# Patient Record
Sex: FEMALE | Race: WHITE | NOT HISPANIC OR LATINO | Employment: OTHER | URBAN - METROPOLITAN AREA
[De-identification: names, ages, dates, MRNs, and addresses within clinical notes are randomized per-mention and may not be internally consistent; named-entity substitution may affect disease eponyms.]

---

## 2017-01-06 ENCOUNTER — OFFICE VISIT (OUTPATIENT)
Dept: OPTOMETRY | Facility: CLINIC | Age: 67
End: 2017-01-06
Payer: COMMERCIAL

## 2017-01-06 DIAGNOSIS — Z96.1 PSEUDOPHAKIA OF BOTH EYES: Primary | ICD-10-CM

## 2017-01-06 PROCEDURE — 99024 POSTOP FOLLOW-UP VISIT: CPT | Performed by: OPTOMETRIST

## 2017-01-06 ASSESSMENT — REFRACTION_MANIFEST
OS_AXIS: 052
OD_SPHERE: -1.00
OD_AXIS: 107
OD_CYLINDER: +0.50
OS_SPHERE: -1.00
OS_ADD: +2.50
OD_SPHERE: -0.75
OS_SPHERE: -0.75
OS_CYLINDER: +0.75
OD_CYLINDER: +0.75
OD_ADD: +2.50
OS_AXIS: 039
OD_AXIS: 115
OS_CYLINDER: +1.00

## 2017-01-06 ASSESSMENT — VISUAL ACUITY
OD_SC+: -2
METHOD: SNELLEN - LINEAR
OS_SC+: +2
OD_SC: 20/20
OS_SC: 20/25

## 2017-01-06 ASSESSMENT — TONOMETRY
OD_IOP_MMHG: 17
OS_IOP_MMHG: 15
IOP_METHOD: TONOPEN

## 2017-01-06 ASSESSMENT — EXTERNAL EXAM - RIGHT EYE: OD_EXAM: NORMAL

## 2017-01-06 ASSESSMENT — SLIT LAMP EXAM - LIDS
COMMENTS: NORMAL
COMMENTS: NORMAL

## 2017-01-06 ASSESSMENT — EXTERNAL EXAM - LEFT EYE: OS_EXAM: NORMAL

## 2017-01-06 NOTE — PROGRESS NOTES
CHIEF COMPLAINT:   Chief Complaint   Patient presents with     Post Op (Ophthalmology) Both Eyes     Final refraction per patient    Type of surgery: cataract both eyes   Date of surgery:  Right eye- 12/22/16  Left eye-12/8/16       OBJECTIVE:     See ophthalmology exam    ASSESSMENT:         ICD-10-CM    1. Pseudophakia of both eyes Z96.1      PLAN:      Patient Instructions   Recommend new glasses.    Return for comprehensive eye exam 11/17.    Gutierrez Castillo OD

## 2017-01-06 NOTE — PATIENT INSTRUCTIONS
Recommend new glasses.    Return for comprehensive eye exam 11/17.    Gutierrez Castillo OD      Optometry Providers       Clinic Locations                                 Telephone Number   Dr. Susana Castillo   [facility]  [phone]     [facility] Hours:                [facility] Hours:       [facility] Hours:  [address]   [address] [facility]     [address] [place] [address]   [place] [address]    [place] [address]  Phone: [phone]                    Phone [phone]                      Phone: [phone]                          Monday 8:00-7:00                          Monday 8:00-7:00                          Monday 8:00-7:00              Tuesday 8:00-6:00                          Tuesday 8:00-7:00                          Tuesday 8:00-7:00              Wednesday 8:00-6:00                  Wednesday 8:00-7:00                   Wednesday 8:00-7:00      Thursday 8:00-6:00                        Thursday 8:00-7:00                         Thursday 8:00-7:00            Friday 8:00-5:00                              Friday 8:00-5:00                              Friday 8:00-5:00    Please log on to [email or web address] to order your contact lenses.  The link is found on the [facility] page.  As always, Thank you for trusting us with your health care needs!

## 2017-01-10 ENCOUNTER — TELEPHONE (OUTPATIENT)
Dept: ONCOLOGY | Facility: CLINIC | Age: 67
End: 2017-01-10

## 2017-01-10 NOTE — TELEPHONE ENCOUNTER
Pt was scheduled with TYRA Sams and she is no longer with us. Rescheduled her with Dr. He on 2/8/17 at 8 am.    Mayra Broussard, CMA

## 2017-01-26 DIAGNOSIS — C43.30 MALIGNANT MELANOMA OF SKIN OF FACE (H): ICD-10-CM

## 2017-01-26 LAB
ALBUMIN SERPL-MCNC: 4.3 G/DL (ref 3.4–5)
ALP SERPL-CCNC: 60 U/L (ref 40–150)
ALT SERPL W P-5'-P-CCNC: 23 U/L (ref 0–50)
ANION GAP SERPL CALCULATED.3IONS-SCNC: 6 MMOL/L (ref 3–14)
AST SERPL W P-5'-P-CCNC: 19 U/L (ref 0–45)
BASOPHILS # BLD AUTO: 0 10E9/L (ref 0–0.2)
BASOPHILS NFR BLD AUTO: 0.4 %
BILIRUB SERPL-MCNC: 0.6 MG/DL (ref 0.2–1.3)
BUN SERPL-MCNC: 25 MG/DL (ref 7–30)
CALCIUM SERPL-MCNC: 9.2 MG/DL (ref 8.5–10.1)
CHLORIDE SERPL-SCNC: 107 MMOL/L (ref 94–109)
CO2 SERPL-SCNC: 30 MMOL/L (ref 20–32)
CREAT SERPL-MCNC: 1.06 MG/DL (ref 0.52–1.04)
DIFFERENTIAL METHOD BLD: NORMAL
EOSINOPHIL # BLD AUTO: 0.1 10E9/L (ref 0–0.7)
EOSINOPHIL NFR BLD AUTO: 2.5 %
ERYTHROCYTE [DISTWIDTH] IN BLOOD BY AUTOMATED COUNT: 14.5 % (ref 10–15)
GFR SERPL CREATININE-BSD FRML MDRD: 52 ML/MIN/1.7M2
GLUCOSE SERPL-MCNC: 90 MG/DL (ref 70–99)
HCT VFR BLD AUTO: 43.8 % (ref 35–47)
HGB BLD-MCNC: 14.6 G/DL (ref 11.7–15.7)
IMM GRANULOCYTES # BLD: 0 10E9/L (ref 0–0.4)
IMM GRANULOCYTES NFR BLD: 0.2 %
LDH SERPL L TO P-CCNC: 182 U/L (ref 81–234)
LYMPHOCYTES # BLD AUTO: 2 10E9/L (ref 0.8–5.3)
LYMPHOCYTES NFR BLD AUTO: 40 %
MCH RBC QN AUTO: 29.3 PG (ref 26.5–33)
MCHC RBC AUTO-ENTMCNC: 33.3 G/DL (ref 31.5–36.5)
MCV RBC AUTO: 88 FL (ref 78–100)
MONOCYTES # BLD AUTO: 0.5 10E9/L (ref 0–1.3)
MONOCYTES NFR BLD AUTO: 9.4 %
NEUTROPHILS # BLD AUTO: 2.3 10E9/L (ref 1.6–8.3)
NEUTROPHILS NFR BLD AUTO: 47.5 %
PLATELET # BLD AUTO: 262 10E9/L (ref 150–450)
POTASSIUM SERPL-SCNC: 3.9 MMOL/L (ref 3.4–5.3)
PROT SERPL-MCNC: 7.2 G/DL (ref 6.8–8.8)
RBC # BLD AUTO: 4.99 10E12/L (ref 3.8–5.2)
SODIUM SERPL-SCNC: 143 MMOL/L (ref 133–144)
WBC # BLD AUTO: 4.9 10E9/L (ref 4–11)

## 2017-01-26 PROCEDURE — 80053 COMPREHEN METABOLIC PANEL: CPT | Performed by: INTERNAL MEDICINE

## 2017-01-26 PROCEDURE — 85025 COMPLETE CBC W/AUTO DIFF WBC: CPT | Performed by: INTERNAL MEDICINE

## 2017-01-26 PROCEDURE — 83615 LACTATE (LD) (LDH) ENZYME: CPT | Performed by: INTERNAL MEDICINE

## 2017-01-26 PROCEDURE — 36415 COLL VENOUS BLD VENIPUNCTURE: CPT | Performed by: INTERNAL MEDICINE

## 2017-02-01 ENCOUNTER — HOSPITAL ENCOUNTER (OUTPATIENT)
Dept: MAMMOGRAPHY | Facility: CLINIC | Age: 67
Discharge: HOME | End: 2017-02-01
Attending: INTERNAL MEDICINE | Admitting: INTERNAL MEDICINE
Payer: MEDICARE

## 2017-02-01 DIAGNOSIS — Z12.31 VISIT FOR SCREENING MAMMOGRAM: ICD-10-CM

## 2017-02-01 PROCEDURE — G0202 SCR MAMMO BI INCL CAD: HCPCS

## 2017-02-08 ENCOUNTER — ONCOLOGY VISIT (OUTPATIENT)
Dept: ONCOLOGY | Facility: CLINIC | Age: 67
End: 2017-02-08
Payer: COMMERCIAL

## 2017-02-08 VITALS
BODY MASS INDEX: 30.52 KG/M2 | HEIGHT: 66 IN | DIASTOLIC BLOOD PRESSURE: 70 MMHG | RESPIRATION RATE: 20 BRPM | HEART RATE: 76 BPM | WEIGHT: 189.9 LBS | OXYGEN SATURATION: 97 % | SYSTOLIC BLOOD PRESSURE: 100 MMHG | TEMPERATURE: 98.3 F

## 2017-02-08 DIAGNOSIS — E04.1 THYROID NODULE: ICD-10-CM

## 2017-02-08 DIAGNOSIS — C43.30 MALIGNANT MELANOMA OF SKIN OF FACE (H): Primary | ICD-10-CM

## 2017-02-08 PROCEDURE — 99214 OFFICE O/P EST MOD 30 MIN: CPT | Performed by: INTERNAL MEDICINE

## 2017-02-08 ASSESSMENT — PAIN SCALES - GENERAL: PAINLEVEL: NO PAIN (0)

## 2017-02-08 NOTE — MR AVS SNAPSHOT
After Visit Summary   2/8/2017    Elisabeth Voss    MRN: [id number]           Patient Information     Date Of Birth          1950        Visit Information        Provider Department      2/8/2017 8:00 AM Clyde He MD [facility]        Today's Diagnoses     Malignant melanoma of skin of face (H)    -  1     Thyroid nodule           Care Instructions      Please follow up with Dr. Brady or available Oncologist in 6 months.  Please come 15-30 minutes early for labs.    Thyroid US Date/Time: 2/14/17 at 11:00 - right after breast ultrasound    Follow Up Date/Time:     If you have any questions or concerns please feel free to call.    Aleksander Schuler, RN, BSN   Oncology Care Coordinator RN  [facility]  [phone]            Follow-ups after your visit        Follow-up notes from your care team     Return in about 6 months (around 8/8/2017).      Your next 10 appointments already scheduled     Feb 14, 2017 10:00 AM   MA DIAGNOSTIC DIGITAL LEFT with PHMA1, PH RAD   [facility] ([facility])    [address] [place] [id number] [phone]           Do not use any powder, lotion or deodorant under your arms or on your breast. If you do, we will ask you to remove it before your exam.  Wear comfortable, two-piece clothing.  If you have any allergies, tell your care team.  Bring any previous mammograms from other facilities or have them mailed to the breast center.            Feb 14, 2017 10:30 AM   US BREAST LEFT LIMITED 1-3 QUAD with PHUS2, PH RAD   [facility] Ultrasound ([facility])    [address] [place] [phone]           Please bring a list of your medicines (including vitamins, minerals and over-the-counter drugs). Also, tell your doctor about any allergies you may have. Wear comfortable clothes and leave your valuables at home.  You do not need to do anything  special to prepare for your exam.  Please call the Imaging Department at your exam site with any questions.            Feb 27, 2017  8:15 AM   PHYSICAL with Yon Gallagher MD   [facility] ([facility])    [address] [place] [id number] [phone]              Future tests that were ordered for you today     Open Future Orders        Priority Expected Expires Ordered    CBC with platelets differential Routine 8/8/2017 2/8/2018 2/8/2017    Comprehensive metabolic panel Routine 8/8/2017 2/8/2018 2/8/2017            Who to contact     If you have questions or need follow up information about today's clinic visit or your schedule please contact [facility] directly at [phone].  Normal or non-critical lab and imaging results will be communicated to you by MyChart, letter or phone within 4 business days after the clinic has received the results. If you do not hear from us within 7 days, please contact the clinic through [employer] or phone. If you have a critical or abnormal lab result, we will notify you by phone as soon as possible.  Submit refill requests through [facility] or call your pharmacy and they will forward the refill request to us. Please allow 3 business days for your refill to be completed.          Additional Information About Your Visit        Outcome Referrals Information     [facility] gives you secure access to your electronic health record. If you see a primary care provider, you can also send messages to your care team and make appointments. If you have questions, please call your primary care clinic.  If you do not have a primary care provider, please call [phone] and they will assist you.        Care EveryWhere ID     This is your Care EveryWhere ID. This could be used by other organizations to access your [facility] medical records  [id number]        Your Vitals Were     Pulse Temperature Respirations Height BMI (Body Mass Index) Pulse  "Oximetry    76 98.3  F (36.8  C) (Oral) 20 1.664 m (5' 5.5\") 31.11 kg/m2 97%       Blood Pressure from Last 3 Encounters:   02/08/17 100/70   12/22/16 122/73   12/08/16 122/82    Weight from Last 3 Encounters:   02/08/17 86.138 kg (189 lb 14.4 oz)   12/16/16 84.823 kg (187 lb)   12/01/16 83.915 kg (185 lb)              We Performed the Following     US Thyroid        Primary Care Provider Office Phone # Fax #    Yon Gallagher [phone]       [facility] [address] [place] [phone]        Thank you!     Thank you for choosing [facility]  for your care. Our goal is always to provide you with excellent care. Hearing back from our patients is one way we can continue to improve our services. Please take a few minutes to complete the written survey that you may receive in the mail after your visit with us. Thank you!             Your Updated Medication List - Protect others around you: Learn how to safely use, store and throw away your medicines at www.disposemymeds.org.          This list is accurate as of: 2/8/17  8:47 AM.  Always use your most recent med list.                   Brand Name Dispense Instructions for use    ACETAMINOPHEN PO      Take 1,000 mg by mouth every 12 hours       levothyroxine 50 MCG tablet    SYNTHROID/LEVOTHROID    90 tablet    TAKE ONE TABLET BY MOUTH ONCE DAILY         "

## 2017-02-08 NOTE — NURSING NOTE
"Elisabeth Voss is a 66 year old female who presents for:  Chief Complaint   Patient presents with     Oncology Clinic Visit     6 month follow up- Malignant melanoma of skin of face         Initial Vitals:  /70 mmHg  Pulse 76  Temp(Src) 98.3  F (36.8  C) (Oral)  Resp 20  Ht 1.664 m (5' 5.5\")  Wt 86.138 kg (189 lb 14.4 oz)  BMI 31.11 kg/m2  SpO2 97% Estimated body mass index is 31.11 kg/(m^2) as calculated from the following:    Height as of this encounter: 1.664 m (5' 5.5\").    Weight as of this encounter: 86.138 kg (189 lb 14.4 oz).. Body surface area is 1.99 meters squared. BP completed using cuff size: regular  No Pain (0) No LMP recorded. Patient has had a hysterectomy. Allergies and medications reviewed.     Medications: Medication refills not needed today.  Pharmacy name entered into [facility]:    [facility] [address] [place] [address] - [address] [place] [address] PHARMACY [place], [place] - [address] [facility]     Comments:     5 minutes for nursing intake (face to face time)   Mali Mobley          "

## 2017-02-08 NOTE — PROGRESS NOTES
FOLLOW-UP VISIT NOTE    PATIENT NAME: Elisabeth Voss MRN # [id number]  DATE OF VISIT: Feb 8, 2017 YOB: 1950    REFERRING PROVIDER: No referring provider defined for this encounter.     CANCER TYPE:  melanoma      SUBJECTIVE     Elisabeth Voss is a 66 year old female whose history includes:    1. June 2014 she had a melanoma removed from the underside of her left chin. Pathology showed a 1.1 mm thickness, Robe's level III, with sentinel nodes negative. PET scan was negative except for hot uptake in the left thyroid nodule. Ultrasound showed 2 nodules on the left thyroid, and 2 on the right. FNA from the hot nodule was negative.    In the interval medicine at [facility] has done well. Last August she had a cough and chest x-ray was negative. Cough has resolved.She has no symptoms to suggest metastatic disease. She has had left knee surgery for a torn meniscus. She is followed by Dr. Velazquez of [facility] every 4 months. She had a lesion from her arm removed which was benign.  The patient just had a mammogram which was read as negative, but she is scheduled for a follow-up ultrasound because of a probable cyst which may have increased slightly in size.            PAST MEDICAL HISTORY     Past Medical History   Diagnosis Date     Cancer (H)      melanoma of cheek     Lyme disease      History of blood transfusion      with hysterectomy many years ago     Thyroid disease      partial thyroidectomy 8/2014           CURRENT OUTPATIENT MEDICATIONS     Current Outpatient Prescriptions   Medication Sig Dispense Refill     levothyroxine (SYNTHROID, LEVOTHROID) 50 MCG tablet TAKE ONE TABLET BY MOUTH ONCE DAILY 90 tablet 3     ACETAMINOPHEN PO Take 1,000 mg by mouth every 12 hours          ALLERGIES     Allergies   Allergen Reactions     No Known Drug Allergies         REVIEW OF SYSTEMS   As above in the HPI, remainder of the review of systems is negative.     PHYSICAL EXAM   B/P: 100/70, T:  98.3, P: 76, R: 20  SpO2 Readings from Last 4 Encounters:   02/08/17 97%   12/22/16 99%   12/08/16 98%   12/02/16 98%     Wt Readings from Last 3 Encounters:   02/08/17 86.138 kg (189 lb 14.4 oz)   12/16/16 84.823 kg (187 lb)   12/01/16 83.915 kg (185 lb)     GEN: NAD  HEENT: Eyes visibly normal, no icterus, injection or pallor. Oropharynx is clear.  NECK: no cervical or supraclavicular lymphadenopathy  LUNGS: clear bilaterally  BREASTS:  negative  CV: regular, no murmurs, rubs, or gallops  ABDOMEN: soft, non-tender, non-distended, normal bowel sounds, no hepatosplenomegaly by percussion or palpation  EXT: warm, well perfused, no edema  NEURO: alert  SKIN: no rashes   LABORATORY AND IMAGING STUDIES     Recent Labs   Lab Test  01/26/17   0733  09/09/16   0818   NA  143  139   POTASSIUM  3.9  4.1   CHLORIDE  107  104   BUN  25  19   CR  1.06*  0.85   GLC  90  94   ANTOINE  9.2  8.8     Recent Labs   Lab Test  01/26/17   0733  09/09/16   0818  02/16/16   0851   08/06/15   0831   WBC  4.9  5.1  4.6   < >  4.5   HGB  14.6  14.5  14.7   < >  14.5   PLT  262  262  247   < >  224   MCV  88  87  88   < >  88   NEUTROPHIL  47.5   --   51.0   --   51.8    < > = values in this interval not displayed.     Recent Labs   Lab Test  01/26/17   0733  06/06/16   1509  02/16/16   0851  08/06/15   0831   BILITOTAL  0.6  0.3  0.5  0.6   ALKPHOS  60  57  61  60   ALT  23  23  34  24   AST  19  21  22  17   ALBUMIN  4.3  4.0  4.3  4.1   LDH  182   --   182  183     TSH   Date Value Ref Range Status   06/06/2016 1.45 0.40 - 4.00 mU/L Final   ]        Results for orders placed or performed during the hospital encounter of 02/01/17   MA Screening Digital Bilateral    Narrative    MA SCREENING DIGITAL BILATERAL with CAD 2/1/2017 8:11 AM    BREAST SYMPTOMS: No current breast complaints.    COMPARISON:  1/29/2016, 1/21/2015, 12/23/2013, 12/19/2012.    BREAST DENSITY: Scattered fibroglandular densities    COMMENTS: No findings of suspicion for  malignancy.    Well-circumscribed rounded lesion in the central retroareolar left  breast may have minimally increased in size likely represents slight  increase in size of a cyst. Further evaluation with spot compression  views and ultrasound are recommended to ensure that this is actually a  cyst and not a separate adjacent lesion.      Impression    IMPRESSION: BI-RADS CATEGORY: 0 - Need Additional Imaging Evaluation  and/or Prior Mammograms for Comparison. Left breast.    RECOMMENDED FOLLOW-UP: Diagnostic Mammogram and Ultrasound, left  breast.    Exam results letter mailed to patient.    NOE WYLIE MD         ASSESSMENT AND PLAN     1. Left chin melanoma, 1.1 mm thickness, node negative.Removed 2-1/2 years ago, with currently no evidence of disease. Will continue routine follow-up with a return in 6 months with similar labs.    2.  History of benign thyroid nodule, but considering that it was very active on PET scan I think a follow-up ultrasound at this point would be reasonable.We will set that up and let her know the results.    3.  Planned breast ultrasound to follow-up on mammographic abnormality which sounds to be benign.    Noe He MD

## 2017-02-08 NOTE — NURSING NOTE
DISCHARGE PLAN:  Next appointments: See patient instruction section  Departure Mode: Ambulatory  Accompanied by:   5 minutes for nursing discharge (face to face time)     Elisabeth Voss is here today for 6 month follow up for Melanoma.  Writing nurse seen patient after Medical Oncology appointment to address questions/concerns/coordinate care. Patient to have Thyroid US which was scheduled after existing breast US appointment.  Follow up in 6 months with labs. Patient ambulated by nurse to  to schedule follow up and/or lab appointments. See patient instructions and Oncologist's Progress note for further details. Questions and concerns addressed to patient's satisfaction. Patient verbalized and demonstrated understanding of plan.  Contact information provided and patient is encouraged to call with any that arise in the interim of care.    Aleksander Schuler, RN, BSN, OCN   Oncology Care Coordinator RN  [facility]  [phone]  2/8/2017, 8:52 AM

## 2017-02-08 NOTE — PATIENT INSTRUCTIONS
Please follow up with Dr. Brady or available Oncologist in 6 months.  Please come 15-30 minutes early for labs.    Thyroid US Date/Time: 2/14/17 at 11:00 - right after breast ultrasound    Follow Up Date/Time:     If you have any questions or concerns please feel free to call.    Aleksander Schuler, RN, BSN   Oncology Care Coordinator RN  [facility]  [phone]

## 2017-02-14 ENCOUNTER — HOSPITAL ENCOUNTER (OUTPATIENT)
Dept: ULTRASOUND IMAGING | Facility: CLINIC | Age: 67
End: 2017-02-14
Attending: INTERNAL MEDICINE
Payer: MEDICARE

## 2017-02-14 ENCOUNTER — HOSPITAL ENCOUNTER (OUTPATIENT)
Dept: MAMMOGRAPHY | Facility: CLINIC | Age: 67
End: 2017-02-14
Attending: INTERNAL MEDICINE
Payer: MEDICARE

## 2017-02-14 ENCOUNTER — HOSPITAL ENCOUNTER (OUTPATIENT)
Dept: ULTRASOUND IMAGING | Facility: CLINIC | Age: 67
Discharge: HOME | End: 2017-02-14
Attending: INTERNAL MEDICINE | Admitting: INTERNAL MEDICINE
Payer: MEDICARE

## 2017-02-14 DIAGNOSIS — R92.8 ABNORMAL MAMMOGRAM: ICD-10-CM

## 2017-02-14 PROCEDURE — 76536 US EXAM OF HEAD AND NECK: CPT

## 2017-02-14 PROCEDURE — G0206 DX MAMMO INCL CAD UNI: HCPCS

## 2017-02-14 PROCEDURE — 76642 ULTRASOUND BREAST LIMITED: CPT | Mod: LT

## 2017-02-27 ENCOUNTER — OFFICE VISIT (OUTPATIENT)
Dept: INTERNAL MEDICINE | Facility: CLINIC | Age: 67
End: 2017-02-27
Payer: COMMERCIAL

## 2017-02-27 VITALS
WEIGHT: 189.38 LBS | RESPIRATION RATE: 16 BRPM | SYSTOLIC BLOOD PRESSURE: 110 MMHG | HEART RATE: 72 BPM | BODY MASS INDEX: 31.03 KG/M2 | DIASTOLIC BLOOD PRESSURE: 60 MMHG | TEMPERATURE: 98 F

## 2017-02-27 DIAGNOSIS — E78.5 HYPERLIPIDEMIA LDL GOAL <160: Primary | ICD-10-CM

## 2017-02-27 DIAGNOSIS — Z00.00 ENCOUNTER FOR ROUTINE ADULT HEALTH EXAMINATION WITHOUT ABNORMAL FINDINGS: Primary | ICD-10-CM

## 2017-02-27 DIAGNOSIS — E78.5 HYPERLIPIDEMIA LDL GOAL <160: ICD-10-CM

## 2017-02-27 DIAGNOSIS — E03.8 OTHER SPECIFIED HYPOTHYROIDISM: ICD-10-CM

## 2017-02-27 LAB
CHOLEST SERPL-MCNC: 265 MG/DL
HDLC SERPL-MCNC: 62 MG/DL
LDLC SERPL CALC-MCNC: 177 MG/DL
NONHDLC SERPL-MCNC: 203 MG/DL
T4 FREE SERPL-MCNC: 1.08 NG/DL (ref 0.76–1.46)
TRIGL SERPL-MCNC: 128 MG/DL
TSH SERPL DL<=0.05 MIU/L-ACNC: 0.79 MU/L (ref 0.4–4)

## 2017-02-27 PROCEDURE — G0009 ADMIN PNEUMOCOCCAL VACCINE: HCPCS | Performed by: INTERNAL MEDICINE

## 2017-02-27 PROCEDURE — 36415 COLL VENOUS BLD VENIPUNCTURE: CPT | Performed by: INTERNAL MEDICINE

## 2017-02-27 PROCEDURE — G0439 PPPS, SUBSEQ VISIT: HCPCS | Performed by: INTERNAL MEDICINE

## 2017-02-27 PROCEDURE — 84439 ASSAY OF FREE THYROXINE: CPT | Performed by: INTERNAL MEDICINE

## 2017-02-27 PROCEDURE — 80061 LIPID PANEL: CPT | Performed by: INTERNAL MEDICINE

## 2017-02-27 PROCEDURE — 84443 ASSAY THYROID STIM HORMONE: CPT | Performed by: INTERNAL MEDICINE

## 2017-02-27 PROCEDURE — 90732 PPSV23 VACC 2 YRS+ SUBQ/IM: CPT | Performed by: INTERNAL MEDICINE

## 2017-02-27 RX ORDER — SIMVASTATIN 20 MG
20 TABLET ORAL AT BEDTIME
Qty: 90 TABLET | Refills: 1 | Status: SHIPPED | OUTPATIENT
Start: 2017-02-27 | End: 2017-08-01

## 2017-02-27 ASSESSMENT — PAIN SCALES - GENERAL: PAINLEVEL: NO PAIN (0)

## 2017-02-27 NOTE — PROGRESS NOTES
SUBJECTIVE:     CC: Elisabeth Voss is an 66 year old woman who presents for preventive health visit.     Healthy Habits:    Do you get at least three servings of calcium containing foods daily (dairy, green leafy vegetables, etc.)? yes    Amount of exercise or daily activities, outside of work: 7 day(s) per week-treadmill    Problems taking medications regularly No    Medication side effects: No    Have you had an eye exam in the past two years? yes    Do you see a dentist twice per year? no    Do you have sleep apnea, excessive snoring or daytime drowsiness?no        Hypothyroidism Follow-up      Since last visit, patient describes the following symptoms: Weight stable, no hair loss, no skin changes, no constipation, no loose stools     Taking synthroid medication and it gives her heartburn.      Today's PHQ-2 Score:   PHQ-2 ( 1999 [employer]) 12/2/2016 2/16/2016   Q1: Little interest or pleasure in doing things 0 -   Q2: Feeling down, depressed or hopeless 0 -   PHQ-2 Score 0 -   Little interest or pleasure in doing things - Not at all   Feeling down, depressed or hopeless - Not at all   PHQ-2 Score - 0       Abuse: Current or Past(Physical, Sexual or Emotional)- No  Do you feel safe in your environment - Yes    Social History   Substance Use Topics     Smoking status: Never Smoker     Smokeless tobacco: Never Used     Alcohol use 0.0 oz/week     0 Standard drinks or equivalent per week      Comment: occasional     The patient does not drink >3 drinks per day nor >7 drinks per week.    Recent Labs   Lab Test  02/23/16   0744  02/17/15   0655  06/26/14   1504   CHOL  268*  251*  234*   HDL  83  63  53   LDL  165*  163*  156*   TRIG  102  123  126   CHOLHDLRATIO   --   4.0  4.0   NHDL  185*   --    --        Reviewed orders with patient.  Reviewed health maintenance and updated orders accordingly - Yes    Mammo Decision Support:  Patient over age 50, mutual decision to screen reflected in health  maintenance.    Pertinent mammograms are reviewed under the imaging tab.  History of abnormal Pap smear: Last 3 Pap Results: No results found for: PAP  All Histories reviewed and updated in Epic.    Breast ultrasound was ok as well.    ROS:  C: NEGATIVE for fever, chills, change in weight  I: NEGATIVE for worrisome rashes, moles or lesions  E: NEGATIVE for vision changes or irritation  ENT: NEGATIVE for ear, mouth and throat problems  R: NEGATIVE for significant cough or SOB  B: NEGATIVE for masses, tenderness or discharge  CV: NEGATIVE for chest pain, palpitations or peripheral edema  GI: heartburn  : NEGATIVE for unusual urinary or vaginal symptoms. No vaginal bleeding.  M: NEGATIVE for significant arthralgias or myalgia  N: NEGATIVE for weakness, dizziness or paresthesias  P: NEGATIVE for changes in mood or affect   Main symptoms is heartburn.    Problem list, Medication list, Allergies, and Medical/Social/Surgical histories [facility] in [facility] and updated as appropriate.  OBJECTIVE:     /60 (BP Location: Right arm, Patient Position: Chair, Cuff Size: Adult Large)  Pulse 72  Temp 98  F (36.7  C) (Tympanic)  Resp 16  Wt 189 lb 6 oz (85.9 kg)  BMI 31.03 kg/m2  EXAM:  GENERAL: healthy, alert and no distress  EYES: Eyes grossly normal to inspection, PERRL and conjunctivae and sclerae normal  HENT: ear canals and TM's normal, nose and mouth without ulcers or lesions  NECK: no adenopathy, no asymmetry, masses, or scars and thyroid normal to palpation  RESP: lungs clear to auscultation - no rales, rhonchi or wheezes  CV: regular rate and rhythm, normal S1 S2, no S3 or S4, no murmur, click or rub, no peripheral edema and peripheral pulses strong  ABDOMEN: soft, nontender, no hepatosplenomegaly, no masses and bowel sounds normal  MS: no gross musculoskeletal defects noted, no edema  SKIN: no suspicious lesions or rashes  NEURO: Normal strength and tone, mentation intact and speech normal  PSYCH: mentation appears  "normal, affect normal/bright    ASSESSMENT/PLAN:         ICD-10-CM    1. Hyperlipidemia LDL goal <160 E78.5 Lipid Profile     TSH     T4 free   2. Other specified hypothyroidism E03.8 Lipid Profile     TSH     T4 free   3. Encounter for routine adult health examination without abnormal findings Z00.00 Lipid Profile     TSH     T4 free     Pneumococcal vaccine 23 valent (Pneumovax) [95024]     1st  Administration  [53019]       COUNSELING:   Reviewed preventive health counseling, as reflected in patient instructions       Regular exercise       Healthy diet/nutrition       Vaccinated for: Pneumococcal         reports that she has never smoked. She has never used smokeless tobacco.    Estimated body mass index is 31.12 kg/(m^2) as calculated from the following:    Height as of 2/8/17: 5' 5.5\" (1.664 m).    Weight as of 2/8/17: 189 lb 14.4 oz (86.1 kg).   Weight management plan: Discussed healthy diet and exercise guidelines and patient will follow up in 12 months in clinic to re-evaluate.    Counseling Resources:  ATP IV Guidelines  Pooled Cohorts Equation Calculator  Breast Cancer Risk Calculator  FRAX Risk Assessment  ICSI Preventive Guidelines  Dietary Guidelines for Americans, 2010  USDA's MyPlate  ASA Prophylaxis  Lung CA Screening    Yon Gallagher MD  [facility]  "

## 2017-02-27 NOTE — TELEPHONE ENCOUNTER
----- Message from Yon Gallagher MD sent at 2/27/2017 10:44 AM CST -----  Her thyroid is good, no changes to synthroid.  Cholesterol is too high, up from last year, would start simvastatin 20mg a day. Then recheck lipids and lfts in 6-8 weeks.

## 2017-02-27 NOTE — NURSING NOTE
"Chief Complaint   Patient presents with     Physical       Initial /60 (BP Location: Right arm, Patient Position: Chair, Cuff Size: Adult Large)  Pulse 72  Temp 98  F (36.7  C) (Tympanic)  Resp 16  Wt 189 lb 6 oz (85.9 kg)  BMI 31.03 kg/m2 Estimated body mass index is 31.03 kg/(m^2) as calculated from the following:    Height as of 2/8/17: 5' 5.5\" (1.664 m).    Weight as of this encounter: 189 lb 6 oz (85.9 kg).  Medication Reconciliation: complete   Jenny Cortez CMA (AAMA)     Prior to injection verified patient identity using patient's name and date of birth.. Patient instructed to wait 20 minutes before leaving clinic. Observe for s/sx of complications and or reactions.  Screening Questionnaire for Adult Immunization    Are you sick today?   No   Do you have allergies to medications, food, a vaccine component or latex?   No   Have you ever had a serious reaction after receiving a vaccination?   No   Do you have a long-term health problem with heart disease, lung disease, asthma, kidney disease, metabolic disease (e.g. diabetes), anemia, or other blood disorder?   No   Do you have cancer, leukemia, HIV/AIDS, or any other immune system problem?   No   In the past 3 months, have you taken medications that affect  your immune system, such as prednisone, other steroids, or anticancer drugs; drugs for the treatment of rheumatoid arthritis, Crohn s disease, or psoriasis; or have you had radiation treatments?   No   Have you had a seizure, or a brain or other nervous system problem?   No   During the past year, have you received a transfusion of blood or blood     products, or been given immune (gamma) globulin or antiviral drug?   No   For women: Are you pregnant or is there a chance you could become        pregnant during the next month?   No   Have you received any vaccinations in the past 4 weeks?   No     Immunization questionnaire answers were all negative.      MNVFC doesn't apply on this " patient    Screening performed by Jenny Cortez on 2/27/2017 at 8:47 AM.    Jenny Cortez CMA ([facility])

## 2017-02-27 NOTE — MR AVS SNAPSHOT
After Visit Summary   2/27/2017    Elisabeth Voss    MRN: [id number]           Patient Information     Date Of Birth          1950        Visit Information        Provider Department      2/27/2017 8:15 AM Yon Gallagher MD [facility] Instructions      Preventive Health Recommendations  Female Ages 65 +    Yearly exam:     See your health care provider every year in order to  o Review health changes.   o Discuss preventive care.    o Review your medicines if your doctor has prescribed any.      You no longer need a yearly Pap test unless you've had an abnormal Pap test in the past 10 years. If you have vaginal symptoms, such as bleeding or discharge, be sure to talk with your provider about a Pap test.      Every 1 to 2 years, have a mammogram.  If you are over 69, talk with your health care provider about whether or not you want to continue having screening mammograms.      Every 10 years, have a colonoscopy. Or, have a yearly FIT test (stool test). These exams will check for colon cancer.       Have a cholesterol test every 5 years, or more often if your doctor advises it.       Have a diabetes test (fasting glucose) every three years. If you are at risk for diabetes, you should have this test more often.       At age 65, have a bone density scan (DEXA) to check for osteoporosis (brittle bone disease).    Shots:    Get a flu shot each year.    Get a tetanus shot every 10 years.    Talk to your doctor about your pneumonia vaccines. There are now two you should receive - Pneumovax (PPSV 23) and Prevnar (PCV 13).    Talk to your doctor about the shingles vaccine.    Talk to your doctor about the hepatitis B vaccine.    Nutrition:     Eat at least 5 servings of fruits and vegetables each day.      Eat whole-grain bread, whole-wheat pasta and brown rice instead of white grains and rice.      Talk to your provider about Calcium and Vitamin D.     Lifestyle    Exercise  at least 150 minutes a week (30 minutes a day, 5 days a week). This will help you control your weight and prevent disease.      Limit alcohol to one drink per day.      No smoking.       Wear sunscreen to prevent skin cancer.       See your dentist twice a year for an exam and cleaning.      See your eye doctor every 1 to 2 years to screen for conditions such as glaucoma, macular degeneration, cataracts, etc         Follow-ups after your visit        Your next 10 appointments already scheduled     Jul 21, 2017  8:00 AM CDT   LAB with [facility]   [facility]    [address] [place] [address] [id number] [phone]           Patient must bring picture ID.  Patient should be prepared to give a urine specimen  Please do not eat 10-12 hours before your appointment if you are coming in fasting for labs on lipids, cholesterol, or glucose (sugar).  Pregnant women should follow their Care Team instructions. Water with medications is okay. Do not drink coffee or other fluids.   If you have concerns about taking  your medications, please ask at office or if scheduling via [employer], send a message by clicking on Secure Messaging, Message Your Care Team.            Jul 27, 2017  8:00 AM CDT   Return Visit with Clyde He MD   [facility] ([address] [phone]              Who to contact     If you have questions or need follow up information about today's clinic visit or your schedule please contact [facility] directly at [phone].  Normal or non-critical lab and imaging results will be communicated to you by MyChart, letter or phone within 4 business days after the clinic has received the results. If you do not hear from us within 7 days, please contact the clinic through MyChart or phone. If you have a critical or abnormal lab result, we will notify you by phone as  soon as possible.  Submit refill requests through [employer] or call your pharmacy and they will forward the refill request to us. Please allow 3 business days for your refill to be completed.          Additional Information About Your Visit        Lapiohart Information     [employer] gives you secure access to your electronic health record. If you see a primary care provider, you can also send messages to your care team and make appointments. If you have questions, please call your primary care clinic.  If you do not have a primary care provider, please call [phone] and they will assist you.        Care EveryWhere ID     This is your Care EveryWhere ID. This could be used by other organizations to access your [facility] medical records  [id number]        Your Vitals Were     Pulse Temperature Respirations BMI (Body Mass Index)          72 98  F (36.7  C) (Tympanic) 16 31.03 kg/m2         Blood Pressure from Last 3 Encounters:   02/27/17 110/60   02/08/17 100/70   12/22/16 122/73    Weight from Last 3 Encounters:   02/27/17 189 lb 6 oz (85.9 kg)   02/08/17 189 lb 14.4 oz (86.1 kg)   12/16/16 187 lb (84.8 kg)              Today, you had the following     No orders found for display       Primary Care Provider Office Phone # Fax #    Yon Gallagher [phone]       [facility] [address] [place] [address]        Thank you!     Thank you for choosing [facility]  for your care. Our goal is always to provide you with excellent care. Hearing back from our patients is one way we can continue to improve our services. Please take a few minutes to complete the written survey that you may receive in the mail after your visit with us. Thank you!             Your Updated Medication List - Protect others around you: Learn how to safely use, store and throw away your medicines at www.disposemymeds.org.          This list is accurate as of: 2/27/17  8:28 AM.  Always use your most  recent med list.                   Brand Name Dispense Instructions for use    ACETAMINOPHEN PO      Take 1,000 mg by mouth every 12 hours       levothyroxine 50 MCG tablet    SYNTHROID/LEVOTHROID    90 tablet    TAKE ONE TABLET BY MOUTH ONCE DAILY

## 2017-02-27 NOTE — TELEPHONE ENCOUNTER
Patient notified of below by voice mail. Please sign for med and close.  Jenny Cortez CMA ([facility])

## 2017-03-14 ENCOUNTER — TELEPHONE (OUTPATIENT)
Dept: OPHTHALMOLOGY | Facility: CLINIC | Age: 67
End: 2017-03-14

## 2017-03-14 NOTE — TELEPHONE ENCOUNTER
Using smooth got red, swollen, and cracked skin on the eye lid. Also stated that when she takes off her glasses the skin feels better. Explained that the unless the frames are metal and they sit directly on her skin that the glasses will not be causing her problem.  Advised her to use a different type of lubricating eye drop as there could be an ingredient in the smooth that she has a sensitivity to. Advised to get preservative free drops. Discussed her light sensitivity and explained that after cataract surgery it is completely normal and expected advised to wear sunglasses to protect her eyes.  Pt verbalized understanding and agreed with this plan    Linda FARMER.

## 2017-03-14 NOTE — TELEPHONE ENCOUNTER
"[facility]    Phone Message    Name of Caller: Elisabeth    Phone Number: Home number on file [phone] (home)    Best time to return call: After 1:00 Pm today    May a detailed message be left on voicemail: yes    Relation to patient: Self    Reason for Call: Symptoms or Concerns     Does patient have any of the following \"red flag\" symptoms: None    Does patient have any \"Red Flag\" symptoms? No.     Current symptom or concern: Patient states she had surgery with Dr. Painter back in December. Patient states the last 3 to 5 light has been bothering her eyes, her eye lids have become dry to the point of needing to add Vaseline around her eyes before applying eye drops. Patient also states her glasses are off the irration goes down. Please advise      Symptoms have been present for:  3-5 day(s)    Has patient previously been seen for this? No      Action Taken: Message routed to:  Adult Clinics: Eye p [id number]    "

## 2017-04-28 DIAGNOSIS — E78.5 HYPERLIPIDEMIA LDL GOAL <160: ICD-10-CM

## 2017-04-28 LAB
ALT SERPL W P-5'-P-CCNC: 38 U/L (ref 0–50)
AST SERPL W P-5'-P-CCNC: 27 U/L (ref 0–45)
CHOLEST SERPL-MCNC: 198 MG/DL
HDLC SERPL-MCNC: 81 MG/DL
LDLC SERPL CALC-MCNC: 99 MG/DL
NONHDLC SERPL-MCNC: 117 MG/DL
TRIGL SERPL-MCNC: 92 MG/DL

## 2017-04-28 PROCEDURE — 80061 LIPID PANEL: CPT | Performed by: INTERNAL MEDICINE

## 2017-04-28 PROCEDURE — 84450 TRANSFERASE (AST) (SGOT): CPT | Performed by: INTERNAL MEDICINE

## 2017-04-28 PROCEDURE — 84460 ALANINE AMINO (ALT) (SGPT): CPT | Performed by: INTERNAL MEDICINE

## 2017-04-28 PROCEDURE — 36415 COLL VENOUS BLD VENIPUNCTURE: CPT | Performed by: INTERNAL MEDICINE

## 2017-07-20 ENCOUNTER — TRANSFERRED RECORDS (OUTPATIENT)
Dept: HEALTH INFORMATION MANAGEMENT | Facility: CLINIC | Age: 67
End: 2017-07-20

## 2017-07-21 DIAGNOSIS — C43.30 MALIGNANT MELANOMA OF SKIN OF FACE (H): ICD-10-CM

## 2017-07-21 LAB
ALBUMIN SERPL-MCNC: 4 G/DL (ref 3.4–5)
ALP SERPL-CCNC: 63 U/L (ref 40–150)
ALT SERPL W P-5'-P-CCNC: 29 U/L (ref 0–50)
ANION GAP SERPL CALCULATED.3IONS-SCNC: 7 MMOL/L (ref 3–14)
AST SERPL W P-5'-P-CCNC: 21 U/L (ref 0–45)
BASOPHILS # BLD AUTO: 0 10E9/L (ref 0–0.2)
BASOPHILS NFR BLD AUTO: 0.4 %
BILIRUB SERPL-MCNC: 0.5 MG/DL (ref 0.2–1.3)
BUN SERPL-MCNC: 10 MG/DL (ref 7–30)
CALCIUM SERPL-MCNC: 9.1 MG/DL (ref 8.5–10.1)
CHLORIDE SERPL-SCNC: 107 MMOL/L (ref 94–109)
CO2 SERPL-SCNC: 29 MMOL/L (ref 20–32)
CREAT SERPL-MCNC: 1.02 MG/DL (ref 0.52–1.04)
DIFFERENTIAL METHOD BLD: NORMAL
EOSINOPHIL # BLD AUTO: 0.2 10E9/L (ref 0–0.7)
EOSINOPHIL NFR BLD AUTO: 3.5 %
ERYTHROCYTE [DISTWIDTH] IN BLOOD BY AUTOMATED COUNT: 14.1 % (ref 10–15)
GFR SERPL CREATININE-BSD FRML MDRD: 54 ML/MIN/1.7M2
GLUCOSE SERPL-MCNC: 91 MG/DL (ref 70–99)
HCT VFR BLD AUTO: 43.3 % (ref 35–47)
HGB BLD-MCNC: 14.1 G/DL (ref 11.7–15.7)
IMM GRANULOCYTES # BLD: 0 10E9/L (ref 0–0.4)
IMM GRANULOCYTES NFR BLD: 0.2 %
LYMPHOCYTES # BLD AUTO: 1.7 10E9/L (ref 0.8–5.3)
LYMPHOCYTES NFR BLD AUTO: 34.4 %
MCH RBC QN AUTO: 28.9 PG (ref 26.5–33)
MCHC RBC AUTO-ENTMCNC: 32.6 G/DL (ref 31.5–36.5)
MCV RBC AUTO: 89 FL (ref 78–100)
MONOCYTES # BLD AUTO: 0.4 10E9/L (ref 0–1.3)
MONOCYTES NFR BLD AUTO: 8.4 %
NEUTROPHILS # BLD AUTO: 2.6 10E9/L (ref 1.6–8.3)
NEUTROPHILS NFR BLD AUTO: 53.1 %
PLATELET # BLD AUTO: 229 10E9/L (ref 150–450)
POTASSIUM SERPL-SCNC: 4.3 MMOL/L (ref 3.4–5.3)
PROT SERPL-MCNC: 6.9 G/DL (ref 6.8–8.8)
RBC # BLD AUTO: 4.88 10E12/L (ref 3.8–5.2)
SODIUM SERPL-SCNC: 143 MMOL/L (ref 133–144)
WBC # BLD AUTO: 4.9 10E9/L (ref 4–11)

## 2017-07-21 PROCEDURE — 85025 COMPLETE CBC W/AUTO DIFF WBC: CPT | Performed by: INTERNAL MEDICINE

## 2017-07-21 PROCEDURE — 36415 COLL VENOUS BLD VENIPUNCTURE: CPT | Performed by: INTERNAL MEDICINE

## 2017-07-21 PROCEDURE — 80053 COMPREHEN METABOLIC PANEL: CPT | Performed by: INTERNAL MEDICINE

## 2017-07-27 ENCOUNTER — ONCOLOGY VISIT (OUTPATIENT)
Dept: ONCOLOGY | Facility: CLINIC | Age: 67
End: 2017-07-27
Payer: COMMERCIAL

## 2017-07-27 VITALS
DIASTOLIC BLOOD PRESSURE: 73 MMHG | TEMPERATURE: 97.1 F | BODY MASS INDEX: 30.15 KG/M2 | HEIGHT: 66 IN | RESPIRATION RATE: 16 BRPM | OXYGEN SATURATION: 97 % | SYSTOLIC BLOOD PRESSURE: 114 MMHG | HEART RATE: 77 BPM | WEIGHT: 187.6 LBS

## 2017-07-27 DIAGNOSIS — C43.30 MALIGNANT MELANOMA OF SKIN OF FACE (H): Primary | ICD-10-CM

## 2017-07-27 PROCEDURE — 99213 OFFICE O/P EST LOW 20 MIN: CPT | Performed by: INTERNAL MEDICINE

## 2017-07-27 ASSESSMENT — PAIN SCALES - GENERAL: PAINLEVEL: NO PAIN (0)

## 2017-07-27 NOTE — PROGRESS NOTES
FOLLOW-UP VISIT NOTE    PATIENT NAME: Elisabeth Voss MRN # [id number]  DATE OF VISIT: Jul 27, 2017 YOB: 1950    REFERRING PROVIDER: No referring provider defined for this encounter.    CANCER TYPE:  melanoma      SUBJECTIVE     Elisabeth Voss is a 66 year old female whose history includes:     1. June 2014 she had a melanoma removed from the underside of her left chin. Pathology showed a 1.1 mm thickness, Robe's level III, with sentinel nodes negative. PET scan was negative except for hot uptake in the left thyroid nodule. Ultrasound showed 2 nodules on the left thyroid, and 2 on the right. FNA from the hot nodule was negative.  2.  2/14/17 followup thyroid ultrasound stable, benign      The patient has no current concerns.          PAST MEDICAL HISTORY     Past Medical History:   Diagnosis Date     Cancer (H)     melanoma of cheek     History of blood transfusion     with hysterectomy many years ago     Lyme disease      Thyroid disease     partial thyroidectomy 8/2014           CURRENT OUTPATIENT MEDICATIONS     Current Outpatient Prescriptions   Medication Sig Dispense Refill     simvastatin (ZOCOR) 20 MG tablet Take 1 tablet (20 mg) by mouth At Bedtime 90 tablet 1     ACETAMINOPHEN PO Take 1,000 mg by mouth every 12 hours       levothyroxine (SYNTHROID, LEVOTHROID) 50 MCG tablet TAKE ONE TABLET BY MOUTH ONCE DAILY 90 tablet 3        ALLERGIES     Allergies   Allergen Reactions     No Known Drug Allergies         REVIEW OF SYSTEMS   As above in the HPI, remainder of the review of systems is negative.     PHYSICAL EXAM   B/P: 114/73, T: 97.1, P: 77, R: 16  SpO2 Readings from Last 4 Encounters:   07/27/17 97%   02/08/17 97%   12/22/16 99%   12/08/16 98%     Wt Readings from Last 3 Encounters:   07/27/17 85.1 kg (187 lb 9.6 oz)   02/27/17 85.9 kg (189 lb 6 oz)   02/08/17 86.1 kg (189 lb 14.4 oz)     GEN: NAD  HEENT: Eyes visibly normal, no icterus, injection or pallor. Oropharynx is  clear.  NECK: no cervical or supraclavicular lymphadenopathy  AXILLAE- neg.  LUNGS: clear bilaterally  CV: regular, no murmurs, rubs, or gallops  ABDOMEN: soft, non-tender, non-distended, normal bowel sounds, no hepatosplenomegaly by percussion or palpation     LABORATORY AND IMAGING STUDIES     Recent Labs   Lab Test  07/21/17   0753  01/26/17   0733   NA  143  143   POTASSIUM  4.3  3.9   CHLORIDE  107  107   BUN  10  25   CR  1.02  1.06*   GLC  91  90   ANTOINE  9.1  9.2     Recent Labs   Lab Test  07/21/17   0753  01/26/17   0733  09/09/16   0818  02/16/16   0851   WBC  4.9  4.9  5.1  4.6   HGB  14.1  14.6  14.5  14.7   PLT  229  262  262  247   MCV  89  88  87  88   NEUTROPHIL  53.1  47.5   --   51.0     Recent Labs   Lab Test  07/21/17   0753  04/28/17   0745  01/26/17   0733  06/06/16   1509  02/16/16   0851  08/06/15   0831   BILITOTAL  0.5   --   0.6  0.3  0.5  0.6   ALKPHOS  63   --   60  57  61  60   ALT  29  38  23  23  34  24   AST  21  27  19  21  22  17   ALBUMIN  4.0   --   4.3  4.0  4.3  4.1   LDH   --    --   182   --   182  183     TSH   Date Value Ref Range Status   02/27/2017 0.79 0.40 - 4.00 mU/L Final   ]     ASSESSMENT AND PLAN     1.  Hx melanoma left neck, now 3 yrs out from surgery.  No evidence of disease.  Prognosis should be good.  Because of location of lesion will continue [id number] followup, probably to the 5 yr point.      Clyde He MD

## 2017-07-27 NOTE — MR AVS SNAPSHOT
After Visit Summary   7/27/2017    Elisabeth Voss    MRN: [id number]           Patient Information     Date Of Birth          1950        Visit Information        Provider Department      7/27/2017 8:00 AM Clyde He MD [facility]        Care Instructions      Please follow up in 6 months.  No need for labs.      Follow Up Date/Time:     If you have any questions or concerns please feel free to call.    Aleksander Schuler RN, BSN   Oncology Care Coordinator RN  [facility]  [phone]              Follow-ups after your visit        Who to contact     If you have questions or need follow up information about today's clinic visit or your schedule please contact [facility] directly at [phone].  Normal or non-critical lab and imaging results will be communicated to you by SupportSpacehart, letter or phone within 4 business days after the clinic has received the results. If you do not hear from us within 7 days, please contact the clinic through SupportSpacehart or phone. If you have a critical or abnormal lab result, we will notify you by phone as soon as possible.  Submit refill requests through [employer] or call your pharmacy and they will forward the refill request to us. Please allow 3 business days for your refill to be completed.          Additional Information About Your Visit        MyChart Information     [employer] gives you secure access to your electronic health record. If you see a primary care provider, you can also send messages to your care team and make appointments. If you have questions, please call your primary care clinic.  If you do not have a primary care provider, please call [phone] and they will assist you.        Care EveryWhere ID     This is your Care EveryWhere ID. This could be used by other organizations to access your [facility] medical records  [id number]        Your Vitals Were     Pulse Temperature Respirations Height Pulse  "Oximetry BMI (Body Mass Index)    77 97.1  F (36.2  C) (Temporal) 16 1.664 m (5' 5.5\") 97% 30.74 kg/m2       Blood Pressure from Last 3 Encounters:   07/27/17 114/73   02/27/17 110/60   02/08/17 100/70    Weight from Last 3 Encounters:   07/27/17 85.1 kg (187 lb 9.6 oz)   02/27/17 85.9 kg (189 lb 6 oz)   02/08/17 86.1 kg (189 lb 14.4 oz)              Today, you had the following     No orders found for display       Primary Care Provider Office Phone # Fax #    Yon Gallagher [phone]       [facility] [address] [place] [address]        Equal Access to Services     DEE DEE RAMIREZ : Hadii aad ku hadasho Soomaali, waaxda luqadaha, qaybta kaalmada adeegyada, terri quiñonez . So [facility] [phone].    ATENCIÓN: Si habla español, tiene a ochoa disposición servicios gratuitos de asistencia lingüística. Claudia al [phone].    We comply with applicable federal civil rights laws and [place] laws. We do not discriminate on the basis of race, color, national origin, age, disability sex, sexual orientation or gender identity.            Thank you!     Thank you for choosing [facility]  for your care. Our goal is always to provide you with excellent care. Hearing back from our patients is one way we can continue to improve our services. Please take a few minutes to complete the written survey that you may receive in the mail after your visit with us. Thank you!             Your Updated Medication List - Protect others around you: Learn how to safely use, store and throw away your medicines at www.disposemymeds.org.          This list is accurate as of: 7/27/17  8:17 AM.  Always use your most recent med list.                   Brand Name Dispense Instructions for use Diagnosis    ACETAMINOPHEN PO      Take 1,000 mg by mouth every 12 hours        levothyroxine 50 MCG tablet    SYNTHROID/LEVOTHROID    90 tablet    TAKE ONE TABLET BY MOUTH ONCE DAILY "    Other specified hypothyroidism       simvastatin 20 MG tablet    ZOCOR    90 tablet    Take 1 tablet (20 mg) by mouth At Bedtime    Hyperlipidemia LDL goal <160

## 2017-07-27 NOTE — PATIENT INSTRUCTIONS
Please follow up in 6 months.  No need for labs.      Follow Up Date/Time:     If you have any questions or concerns please feel free to call.    Aleksander Schuler RN, BSN   Oncology Care Coordinator RN  [facility]  [phone]

## 2017-07-27 NOTE — NURSING NOTE
"Oncology Rooming Note    July 27, 2017 7:57 AM   Elisabeth Voss is a 67 year old female who presents for:    Chief Complaint   Patient presents with     Oncology Clinic Visit     6 month follow up for Malignant melanoma of skin of face     Results     Labs 7/21/2017     Initial Vitals: /73 (BP Location: Right arm, Patient Position: Chair, Cuff Size: Adult Regular)  Pulse 77  Temp 97.1  F (36.2  C) (Temporal)  Resp 16  Ht 1.664 m (5' 5.5\")  Wt 85.1 kg (187 lb 9.6 oz)  SpO2 97%  BMI 30.74 kg/m2 Estimated body mass index is 30.74 kg/(m^2) as calculated from the following:    Height as of this encounter: 1.664 m (5' 5.5\").    Weight as of this encounter: 85.1 kg (187 lb 9.6 oz). Body surface area is 1.98 meters squared.  No Pain (0) Comment: Data Unavailable   No LMP recorded. Patient has had a hysterectomy.  Allergies reviewed: Yes  Medications reviewed: Yes    Medications: Medication refills not needed today.  Pharmacy name entered into [employer]ges:    [facility] [address] [place] [address] - [address] [place] [address] PHARMACY [place], [place] - [address] [address] [facility] [address]     Clinical concerns:Wants to know about recurrence. Dr. He was notified.        Doreen Lyon MA              "

## 2017-07-27 NOTE — NURSING NOTE
DISCHARGE PLAN:  Next appointments: See patient instruction section  Departure Mode: Ambulatory  Accompanied by:   5 minutes for nursing discharge (face to face time)     Elisabeth Voss is here today for Oncology follow up for Melanoma.  Writing nurse seen patient after Medical Oncology appointment to address questions/concerns/coordinate care. Patient to continue with follow up in 6 months.  No labs needed.  Reviewed importance of skin exams. Patient ambulated by nurse to  to schedule follow up and/or lab appointments. See patient instructions and Oncologist's Progress note for further details. Questions and concerns addressed to patient's satisfaction. Patient verbalized and demonstrated understanding of plan.  Contact information provided and patient is encouraged to call with any that arise in the interim of care.    Aleksander Schuler, RN, BSN, OCN   Oncology Care Coordinator RN  [facility]  [phone]  7/27/2017, 8:28 AM

## 2017-08-01 ENCOUNTER — TELEPHONE (OUTPATIENT)
Dept: INTERNAL MEDICINE | Facility: CLINIC | Age: 67
End: 2017-08-01

## 2017-08-01 ENCOUNTER — TRANSFERRED RECORDS (OUTPATIENT)
Dept: HEALTH INFORMATION MANAGEMENT | Facility: CLINIC | Age: 67
End: 2017-08-01

## 2017-08-01 DIAGNOSIS — E78.5 HYPERLIPIDEMIA LDL GOAL <160: ICD-10-CM

## 2017-08-01 DIAGNOSIS — E03.8 OTHER SPECIFIED HYPOTHYROIDISM: ICD-10-CM

## 2017-08-01 RX ORDER — SIMVASTATIN 20 MG
20 TABLET ORAL AT BEDTIME
Qty: 10 TABLET | Refills: 0 | Status: SHIPPED | OUTPATIENT
Start: 2017-08-01 | End: 2017-10-04

## 2017-08-01 RX ORDER — LEVOTHYROXINE SODIUM 50 UG/1
50 TABLET ORAL DAILY
Qty: 10 TABLET | Refills: 0 | Status: SHIPPED | OUTPATIENT
Start: 2017-08-01 | End: 2017-10-04

## 2017-08-01 NOTE — TELEPHONE ENCOUNTER
Reason for Call:  Medication or medication refill:    Do you use a [facility]?  Name of the pharmacy and phone number for the current request[facility] [place]    Name of the medication requested: simvastatin and levothyroxine    Other request: Patient is out of town and just realized that she forgot her meds. Can she get about 10 pills of each so she does not have to drive all the way home again. It is a 3 hour drive for her.     Can we leave a detailed message on this number? YES    Phone number patient can be reached at: Other phone number:  [phone]    Best Time: any    Call taken on 8/1/2017 at 2:20 PM by Elda King

## 2017-08-27 DIAGNOSIS — E03.8 OTHER SPECIFIED HYPOTHYROIDISM: ICD-10-CM

## 2017-08-27 NOTE — TELEPHONE ENCOUNTER
Synthroid      Last Written Prescription Date: 8/1/2017  Last Quantity: 10, # refills: 0  Last Office Visit with [facility] or [facility] prescribing provider: 2/27/2017        TSH   Date Value Ref Range Status   02/27/2017 0.79 0.40 - 4.00 mU/L Final

## 2017-08-29 RX ORDER — LEVOTHYROXINE SODIUM 50 UG/1
50 TABLET ORAL DAILY
Qty: 90 TABLET | Refills: 1 | Status: SHIPPED | OUTPATIENT
Start: 2017-08-29 | End: 2018-02-28

## 2017-10-01 ENCOUNTER — HOSPITAL ENCOUNTER (EMERGENCY)
Facility: CLINIC | Age: 67
Discharge: HOME | End: 2017-10-01
Attending: FAMILY MEDICINE | Admitting: FAMILY MEDICINE
Payer: MEDICARE

## 2017-10-01 ENCOUNTER — APPOINTMENT (OUTPATIENT)
Dept: GENERAL RADIOLOGY | Facility: CLINIC | Age: 67
End: 2017-10-01
Attending: FAMILY MEDICINE
Payer: MEDICARE

## 2017-10-01 VITALS
WEIGHT: 194 LBS | SYSTOLIC BLOOD PRESSURE: 130 MMHG | BODY MASS INDEX: 31.18 KG/M2 | HEIGHT: 66 IN | RESPIRATION RATE: 18 BRPM | TEMPERATURE: 97.6 F | HEART RATE: 66 BPM | OXYGEN SATURATION: 97 % | DIASTOLIC BLOOD PRESSURE: 76 MMHG

## 2017-10-01 DIAGNOSIS — S92.324A CLOSED NONDISPLACED FRACTURE OF SECOND METATARSAL BONE OF RIGHT FOOT, INITIAL ENCOUNTER: ICD-10-CM
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
## 2017-10-01 PROCEDURE — 99283 EMERGENCY DEPT VISIT LOW MDM: CPT | Performed by: FAMILY MEDICINE

## 2017-10-01 PROCEDURE — 73630 X-RAY EXAM OF FOOT: CPT | Mod: TC,RT

## 2017-10-01 PROCEDURE — 99283 EMERGENCY DEPT VISIT LOW MDM: CPT | Mod: Z6 | Performed by: FAMILY MEDICINE

## 2017-10-01 PROCEDURE — 25000132 ZZH RX MED GY IP 250 OP 250 PS 637: Mod: GY | Performed by: FAMILY MEDICINE

## 2017-10-01 RX ORDER — IBUPROFEN 600 MG/1
600 TABLET, FILM COATED ORAL ONCE
Status: COMPLETED | OUTPATIENT
Start: 2017-10-01 | End: 2017-10-01

## 2017-10-01 RX ADMIN — IBUPROFEN 600 MG: 600 TABLET ORAL at 09:32

## 2017-10-01 NOTE — DISCHARGE INSTRUCTIONS
See Dr. Rousseau in clinic.  Boot and crutches until then.  You may use Naproxen and Tylenol as needed for pain.  Ice 20 minutes per hour, (20 minutes on, 40 minutes off) at least 4 times a day.   Elevate your foot above heart level to keep the swelling down.  It was a pleasure visiting with both of you this morning.  I this heals up quickly for you.  Thank you for being so patient with us during your ED stay.  We really do appreciate it.     Thank you for choosing [facility]. We appreciate the opportunity to meet your urgent medical needs. Please let us know if we could have done anything to make your stay more satisfying.    After discharge, please closely monitor for any new or worsening symptoms. Return to the Emergency Department if you develop any acute worsening signs or symptoms.    If you had lab work, cultures or imaging studies done during your stay, the final results may still be pending. We will call you if your plan of care needs to change. However, if you are not improving as expected, please follow up with your primary care provider or clinic.     Start any prescription medications that were prescribed to you and take them as directed.     Please see additional handouts that may be pertinent to your condition.        Foot Fracture  You have a broken bone (fracture) in your foot. This will cause pain, swelling, and often bruising. It will usually take about 4 to 8 weeks to heal. A foot fracture may be treated with a special shoe, splint, cast, or boot.  Home care  Follow these guidelines when caring for yourself at home:    You may be given a splint, cast, shoe, or boot to keep the injured area from moving. Unless you were told otherwise, use crutches or a walker. Don t put weight on the injured foot until your health care provider says you can do so. (You can rent crutches and a walker at many pharmacies and surgical or orthopedic supply stores.) Don t put weight on a splint, or it will  break.    Keep your leg elevated to reduce pain and swelling. When sleeping, put a pillow under the injured leg. When sitting, support the injured leg so it is above your waist. This is very important during the first 2 days (48 hours).    Put an ice pack on the injured area. Do this for 20 minutes every 1 to 2 hours the first day for pain relief. You can make an ice pack by wrapping a plastic bag of ice cubes in a thin towel. As the ice melts, be careful that the splint, cast, boot, or shoe doesn t get wet. You can place the ice pack directly over the splint or cast. Unless told otherwise, you can open the boot or shoe to apply the ice pack. Continue using the ice pack 3 to 4 times a day for the next 2 days. Then use the ice pack as needed to ease pain and swelling.    Keep the splint, cast, boot, or shoe dry. When bathing, protect it with a large plastic bag, rubber-banded at the top end. If a fiberglass splint or cast or boot gets wet, you can dry it with a hair dryer. Unless told otherwise, you can take off the boot or shoe to bathe.    You may use acetaminophen or ibuprofen to control pain, unless another pain medicine was prescribed. If you have chronic liver or kidney disease, talk with your healthcare provider before using these medicines. Also talk with your provider if you ve had a stomach ulcer or gastrointestinal bleeding.    Don t put creams or objects under the cast if you have itching.  Follow-up care  Follow up with your healthcare provider, or as advised. This is to make sure the bone is healing the way it should. If you were given a splint, it may be changed to a cast or boot at your follow-up visit.  X-rays may be taken. You will be told of any new findings that may affect your care.  When to seek medical advice  Call your healthcare provider right away if any of these occur:    The cast or splint cracks    The plaster cast or splint becomes wet or soft    The fiberglass cast or splint stays wet  for more than 24 hours    Bad odor from the cast or wound fluid stains the cast    Tightness or pain under the cast or splint gets worse    Toes become swollen, cold, blue, numb, or tingly    You can t move your toes    Skin around cast or splint becomes red    Fever of 100.4 F (38 C) or higher, or as directed by your healthcare provider  Date Last Reviewed: 2/1/2017 2000-2017 [employer]. [address] [place] [address]. All rights reserved. This information is not intended as a substitute for professional medical care. Always follow your healthcare professional's instructions.

## 2017-10-01 NOTE — ED AVS SNAPSHOT
[facility] Emergency Department    [address] [place] [address]    Phone:  [phone]    Fax:  [phone]                                       Elisabeth Voss   MRN: [id number]    Department:  [facility] Emergency Department   Date of Visit:  10/1/2017           Patient Information     Date Of Birth          1950        Your diagnoses for this visit were:     Closed nondisplaced fracture of second metatarsal bone of right foot, initial encounter        You were seen by Jacob Batista MD.      Follow-up Information     Follow up with Hadley Rousseau DPM.    Specialty:  Podiatry    Why:  as scheduled    Contact information:    [address] [place] [id number] [phone]          Discharge Instructions       See Dr. Rousseau in clinic.  Boot and crutches until then.  You may use Naproxen and Tylenol as needed for pain.  Ice 20 minutes per hour, (20 minutes on, 40 minutes off) at least 4 times a day.   Elevate your foot above heart level to keep the swelling down.  It was a pleasure visiting with both of you this morning.  I this heals up quickly for you.  Thank you for being so patient with us during your ED stay.  We really do appreciate it.     Thank you for choosing [facility]. We appreciate the opportunity to meet your urgent medical needs. Please let us know if we could have done anything to make your stay more satisfying.    After discharge, please closely monitor for any new or worsening symptoms. Return to the Emergency Department if you develop any acute worsening signs or symptoms.    If you had lab work, cultures or imaging studies done during your stay, the final results may still be pending. We will call you if your plan of care needs to change. However, if you are not improving as expected, please follow up with your primary care provider or clinic.     Start any prescription medications that were prescribed to you and take  them as directed.     Please see additional handouts that may be pertinent to your condition.        Foot Fracture  You have a broken bone (fracture) in your foot. This will cause pain, swelling, and often bruising. It will usually take about 4 to 8 weeks to heal. A foot fracture may be treated with a special shoe, splint, cast, or boot.  Home care  Follow these guidelines when caring for yourself at home:    You may be given a splint, cast, shoe, or boot to keep the injured area from moving. Unless you were told otherwise, use crutches or a walker. Don t put weight on the injured foot until your health care provider says you can do so. (You can rent crutches and a walker at many pharmacies and surgical or orthopedic supply stores.) Don t put weight on a splint, or it will break.    Keep your leg elevated to reduce pain and swelling. When sleeping, put a pillow under the injured leg. When sitting, support the injured leg so it is above your waist. This is very important during the first 2 days (48 hours).    Put an ice pack on the injured area. Do this for 20 minutes every 1 to 2 hours the first day for pain relief. You can make an ice pack by wrapping a plastic bag of ice cubes in a thin towel. As the ice melts, be careful that the splint, cast, boot, or shoe doesn t get wet. You can place the ice pack directly over the splint or cast. Unless told otherwise, you can open the boot or shoe to apply the ice pack. Continue using the ice pack 3 to 4 times a day for the next 2 days. Then use the ice pack as needed to ease pain and swelling.    Keep the splint, cast, boot, or shoe dry. When bathing, protect it with a large plastic bag, rubber-banded at the top end. If a fiberglass splint or cast or boot gets wet, you can dry it with a hair dryer. Unless told otherwise, you can take off the boot or shoe to bathe.    You may use acetaminophen or ibuprofen to control pain, unless another pain medicine was prescribed. If you  have chronic liver or kidney disease, talk with your healthcare provider before using these medicines. Also talk with your provider if you ve had a stomach ulcer or gastrointestinal bleeding.    Don t put creams or objects under the cast if you have itching.  Follow-up care  Follow up with your healthcare provider, or as advised. This is to make sure the bone is healing the way it should. If you were given a splint, it may be changed to a cast or boot at your follow-up visit.  X-rays may be taken. You will be told of any new findings that may affect your care.  When to seek medical advice  Call your healthcare provider right away if any of these occur:    The cast or splint cracks    The plaster cast or splint becomes wet or soft    The fiberglass cast or splint stays wet for more than 24 hours    Bad odor from the cast or wound fluid stains the cast    Tightness or pain under the cast or splint gets worse    Toes become swollen, cold, blue, numb, or tingly    You can t move your toes    Skin around cast or splint becomes red    Fever of 100.4 F (38 C) or higher, or as directed by your healthcare provider  Date Last Reviewed: 2/1/2017 2000-2017 [employer]. [address] [place] [address]. All rights reserved. This information is not intended as a substitute for professional medical care. Always follow your healthcare professional's instructions.            Future Appointments        Provider Department Dept Phone Center    10/4/2017 4:00 PM Hadley Rousseau DPM [facility] [phone] [facility]    1/30/2018 9:00 AM Dorothy Castellon MD [facility] [phone] [facility]      24 Hour Appointment Hotline       To make an appointment at any [facility], call [phone] ([phone]). If you don't have a family doctor or clinic, we will help you find one. [facility] are conveniently located to serve the needs of you and your  family.          ED Discharge Orders     Crutches       Use gait belt during crutch training.            Walking brace                    Review of your medicines      Our records show that you are taking the medicines listed below. If these are incorrect, please call your family doctor or clinic.        Dose / Directions Last dose taken    ACETAMINOPHEN PO   Dose:  1000 mg   Indication:  Pain, knee pain        Take 1,000 mg by mouth every 12 hours   Refills:  0        * levothyroxine 50 MCG tablet   Commonly known as:  SYNTHROID/LEVOTHROID   Dose:  50 mcg   Quantity:  10 tablet        Take 1 tablet (50 mcg) by mouth daily   Refills:  0        * levothyroxine 50 MCG tablet   Commonly known as:  SYNTHROID/LEVOTHROID   Dose:  50 mcg   Quantity:  90 tablet        Take 1 tablet (50 mcg) by mouth daily   Refills:  1        NAPROXEN PO   Dose:  660 mg        Take 660 mg by mouth 2 times daily as needed for moderate pain   Refills:  0        * simvastatin 20 MG tablet   Commonly known as:  ZOCOR   Dose:  20 mg   Quantity:  10 tablet        Take 1 tablet (20 mg) by mouth At Bedtime   Refills:  0        * simvastatin 20 MG tablet   Commonly known as:  ZOCOR   Quantity:  90 tablet        TAKE ONE TABLET BY MOUTH AT BEDTIME   Refills:  1        * Notice:  This list has 4 medication(s) that are the same as other medications prescribed for you. Read the directions carefully, and ask your doctor or other care provider to review them with you.            Procedures and tests performed during your visit     XR Foot Right G/E 3 Views      Orders Needing Specimen Collection     None      Pending Results     Date and Time Order Name Status Description    10/1/2017 0858 XR Foot Right G/E 3 Views Preliminary             Pending Culture Results     No orders found from 9/29/2017 to 10/2/2017.            Pending Results Instructions     If you had any lab results that were not finalized at the time of your Discharge, you can call the ED  Lab Result RN at [phone]. You will be contacted by this team for any positive Lab results or changes in treatment. The nurses are available 7 days a week from 10A to 6:30P.  You can leave a message 24 hours per day and they will return your call.        Thank you for choosing [facility]       Thank you for choosing [facility] for your care. Our goal is always to provide you with excellent care. Hearing back from our patients is one way we can continue to improve our services. Please take a few minutes to complete the written survey that you may receive in the mail after you visit with us. Thank you!        [employer] Information     [employer] gives you secure access to your electronic health record. If you see a primary care provider, you can also send messages to your care team and make appointments. If you have questions, please call your primary care clinic.  If you do not have a primary care provider, please call [phone] and they will assist you.        Care EveryWhere ID     This is your Care EveryWhere ID. This could be used by other organizations to access your [facility] medical records  [id number]        Equal Access to Services     DEE DEE RAMIREZ : Hadgordon Gudino, wachristine lujesus, qadeo kaalreymundo lopez, treri rasmussen. So [facility] [phone].    ATENCIÓN: Si habla español, tiene a ochoa disposición servicios gratuitos de asistencia lingüística. Llame al [phone].    We comply with applicable federal civil rights laws and [place] laws. We do not discriminate on the basis of race, color, national origin, age, disability, sex, sexual orientation, or gender identity.            After Visit Summary       This is your record. Keep this with you and show to your community pharmacist(s) and doctor(s) at your next visit.

## 2017-10-01 NOTE — ED AVS SNAPSHOT
[facility] Emergency Department    [address] [place] [address]    Phone:  [phone]    Fax:  [phone]                                       Elisabeth Voss   MRN: [id number]    Department:  [facility] Emergency Department   Date of Visit:  10/1/2017           After Visit Summary Signature Page     I have received my discharge instructions, and my questions have been answered. I have discussed any challenges I see with this plan with the nurse or doctor.    ..........................................................................................................................................  Patient/Patient Representative Signature      ..........................................................................................................................................  Patient Representative Print Name and Relationship to Patient    ..................................................               ................................................  Date                                            Time    ..........................................................................................................................................  Reviewed by Signature/Title    ...................................................              ..............................................  Date                                                            Time

## 2017-10-02 NOTE — ED PROVIDER NOTES
History     Chief Complaint   Patient presents with     Foot Injury     HPI  Elisabeth Voss is a 67 year old female who present to the ED with right foot pain that started last night after she fell off of a hay wagon as it was making a turn.  She is uncertain how she landed.  She did not suffer any other injuries.  No loss of consciousness.  Has pain over the dorsum of the foot, just distal to the ankle.  It is painful to bear weight.  Here with her .    Past Medical History:   Diagnosis Date     Cancer (H)     melanoma of cheek     History of blood transfusion     with hysterectomy many years ago     Lyme disease      Thyroid disease     partial thyroidectomy 8/2014       Past Surgical History:   Procedure Laterality Date     ARTHROSCOPY KNEE WITH MEDIAL MENISCECTOMY Left 9/20/2016    Procedure: ARTHROSCOPY KNEE WITH MEDIAL MENISCECTOMY;  Surgeon: Maximilian Ochoa DO;  [facility]     BIOPSY NODE SENTINEL N/A 8/26/2014    Procedure: BIOPSY NODE SENTINEL;  Surgeon: Eliza Foss MD;  [facility]     C TOTAL ABDOM HYSTERECTOMY  11/16/1993    TAHRSO, Rasheed urethropexy     COLONOSCOPY  3/22/2013    Procedure: COLONOSCOPY;  colonoscopy;  Surgeon: Mike Lr MD;  Location: [facility]     HYSTERECTOMY, PAP NO LONGER INDICATED       ORTHOPEDIC SURGERY      shoulder surgery     PHACOEMULSIFICATION WITH STANDARD INTRAOCULAR LENS IMPLANT Left 12/8/2016    Procedure: PHACOEMULSIFICATION WITH STANDARD INTRAOCULAR LENS IMPLANT;  Surgeon: Jame Painter MD;  Location: [facility]     PHACOEMULSIFICATION WITH STANDARD INTRAOCULAR LENS IMPLANT Right 12/22/2016    Procedure: PHACOEMULSIFICATION WITH STANDARD INTRAOCULAR LENS IMPLANT;  Surgeon: Jame Painter MD;  Location[facility]     RECONSTRUCT NOSE STAGE ONE N/A 9/5/2014    Procedure: RECONSTRUCT NOSE STAGE ONE;  Surgeon: Levy Holbrook MD;  [facility]     REVISE SCAR FACE N/A 8/26/2014    Procedure: REVISE SCAR  "FACE;  Surgeon: Eliza Foss MD;  [facility]     THYROIDECTOMY Left 8/26/2014    Procedure: THYROIDECTOMY;  Surgeon: Eliza Foss MD;  [facility]       Social History     Social History     Marital status:      Spouse name: N/A     Number of children: N/A     Years of education: N/A     Occupational History     Not on file.     Social History Main Topics     Smoking status: Never Smoker     Smokeless tobacco: Never Used     Alcohol use 0.0 oz/week     0 Standard drinks or equivalent per week      Comment: occasional     Drug use: No     Sexual activity: Yes     Partners: Male     Birth control/ protection: Surgical     Other Topics Concern     Not on file     Social History Narrative          Allergies   Allergen Reactions     No Known Drug Allergies        Med List Reviewed       I have reviewed the Medications, Allergies, Past Medical and Surgical History, and Social History in the Epic system.         Review of Systems   All other systems reviewed and are negative.      Physical Exam   BP: 130/76  Pulse: 66  Temp: 97.6  F (36.4  C)  Resp: 18  Height: 167.6 cm (5' 6\")  Weight: 88 kg (194 lb)  SpO2: 97 %  Physical Exam   Constitutional: She is oriented to person, place, and time. She appears well-developed and well-nourished. No distress.   HENT:   Head: Atraumatic.   Pulmonary/Chest: Effort normal. No respiratory distress.   Musculoskeletal:        Right hip: Normal.        Right knee: Normal.        Right ankle: Normal.        Right lower leg: Normal.        Right foot: There is tenderness and swelling.        Feet:    Neurological: She is alert and oriented to person, place, and time.   Skin: Skin is warm.   Psychiatric: She has a normal mood and affect.       ED Course    X-ray shows a nondisplaced, slightly comminuted fracture of the proximal 2nd metatarsal on the right.           ED Course     Procedures    Results for orders placed or performed during the hospital encounter " of 10/01/17 (from the past 24 hour(s))   XR Foot Right G/E 3 Views    Narrative    FOOT RIGHT THREE OR MORE VIEWS  10/1/2017 9:11 AM     COMPARISON: None    HISTORY: Trauma    FINDINGS: There is lucency in the proximal aspect of the right second  metatarsal that could represent a nondisplaced fracture, possibly  comminuted. The remaining visualized bones and joint spaces are within  normal limits.    This imaging study was discussed with the ordering physician, Dr. JERARDO JAMESON, by Dr. Palencia on 10/1/2017 10:20 AM.    HAILE PALENCIA MD        Assessments & Plan (with Medical Decision Making)    (S92.324A) Closed nondisplaced fracture of second metatarsal bone of right foot, initial encounter  Comment:   Plan: Walking brace, Crutches        Podiatry appointment scheduled for her.        I have reviewed the nursing notes.    I have reviewed the findings, diagnosis, plan and need for follow up with the patient.       Discharge Medication List as of 10/1/2017 10:40 AM          Final diagnoses:   Closed nondisplaced fracture of second metatarsal bone of right foot, initial encounter       10/1/2017   [facility] EMERGENCY DEPARTMENT     Jerardo Jameson MD  10/01/17 1917

## 2017-10-04 ENCOUNTER — OFFICE VISIT (OUTPATIENT)
Dept: PODIATRY | Facility: CLINIC | Age: 67
End: 2017-10-04
Payer: COMMERCIAL

## 2017-10-04 VITALS — HEIGHT: 66 IN | BODY MASS INDEX: 31.18 KG/M2 | WEIGHT: 194 LBS | TEMPERATURE: 98.4 F

## 2017-10-04 DIAGNOSIS — S92.324A CLOSED NONDISPLACED FRACTURE OF SECOND METATARSAL BONE OF RIGHT FOOT, INITIAL ENCOUNTER: Primary | ICD-10-CM

## 2017-10-04 PROCEDURE — 99203 OFFICE O/P NEW LOW 30 MIN: CPT | Performed by: PODIATRIST

## 2017-10-04 RX ORDER — OXYCODONE AND ACETAMINOPHEN 5; 325 MG/1; MG/1
1 TABLET ORAL EVERY 4 HOURS PRN
Qty: 30 TABLET | Refills: 0 | Status: SHIPPED | OUTPATIENT
Start: 2017-10-04 | End: 2017-10-23

## 2017-10-04 ASSESSMENT — PAIN SCALES - GENERAL: PAINLEVEL: SEVERE PAIN (6)

## 2017-10-04 NOTE — PROGRESS NOTES
HPI:  Elisabeth Voss is a 67 year old female who is seen in consultation at the request of ED Juan - Jacob Gaines MD.    Pt presents for eval of:   (Onset, Location, L/R, Character, Treatments, Injury if yes)     XR Right foot 10/1/2017    9/30/2017 fell off hay wagon while making a turn and landed on Right foot.  Sharp and stabbing that radiates dorsal and anterior Right foot and leg, dull ache, swelling, bruising, pain 6  Minimal WB w/tall gray fracture boot 24/7 and aid of crutches, elevation, ice, aleve    Retired and active.    Weight management plan: Patient was referred to their PCP to discuss a diet and exercise plan.     ROS:  10 point ROS neg other than the symptoms noted above in the HPI.    PAST MEDICAL HISTORY:   Past Medical History:   Diagnosis Date     Cancer (H)     melanoma of cheek     History of blood transfusion     with hysterectomy many years ago     Lyme disease      Thyroid disease     partial thyroidectomy 8/2014        PAST SURGICAL HISTORY:   Past Surgical History:   Procedure Laterality Date     ARTHROSCOPY KNEE WITH MEDIAL MENISCECTOMY Left 9/20/2016    Procedure: ARTHROSCOPY KNEE WITH MEDIAL MENISCECTOMY;  Surgeon: Maximilian Ochoa DO;  [facility]     BIOPSY NODE SENTINEL N/A 8/26/2014    Procedure: BIOPSY NODE SENTINEL;  Surgeon: Eliza Foss MD;  [facility]     C TOTAL ABDOM HYSTERECTOMY  11/16/1993    TAHRSO, Rasheed urethropexy     COLONOSCOPY  3/22/2013    Procedure: COLONOSCOPY;  colonoscopy;  Surgeon: Mike Lr MD;  [facility]     HYSTERECTOMY, PAP NO LONGER INDICATED       ORTHOPEDIC SURGERY      shoulder surgery     PHACOEMULSIFICATION WITH STANDARD INTRAOCULAR LENS IMPLANT Left 12/8/2016    Procedure: PHACOEMULSIFICATION WITH STANDARD INTRAOCULAR LENS IMPLANT;  Surgeon: Jame Painter MD;  [facility]     PHACOEMULSIFICATION WITH STANDARD INTRAOCULAR LENS IMPLANT Right 12/22/2016    Procedure:  PHACOEMULSIFICATION WITH STANDARD INTRAOCULAR LENS IMPLANT;  Surgeon: Jame Painter MD;  [facility]     RECONSTRUCT NOSE STAGE ONE N/A 9/5/2014    Procedure: RECONSTRUCT NOSE STAGE ONE;  Surgeon: Levy Holbrook MD;  [facility]     REVISE SCAR FACE N/A 8/26/2014    Procedure: REVISE SCAR FACE;  Surgeon: Eliza Foss MD;  [facility]     THYROIDECTOMY Left 8/26/2014    Procedure: THYROIDECTOMY;  Surgeon: Eliza Foss MD;  [facility]        MEDICATIONS:   Current Outpatient Prescriptions:      oxyCODONE-acetaminophen (PERCOCET) 5-325 MG per tablet, Take 1 tablet by mouth every 4 hours as needed for moderate to severe pain, Disp: 30 tablet, Rfl: 0     NAPROXEN PO, Take 660 mg by mouth 2 times daily as needed for moderate pain, Disp: , Rfl:      levothyroxine (SYNTHROID/LEVOTHROID) 50 MCG tablet, Take 1 tablet (50 mcg) by mouth daily, Disp: 90 tablet, Rfl: 1     simvastatin (ZOCOR) 20 MG tablet, TAKE ONE TABLET BY MOUTH AT BEDTIME, Disp: 90 tablet, Rfl: 1     ACETAMINOPHEN PO, Take 1,000 mg by mouth every 12 hours, Disp: , Rfl:      [DISCONTINUED] simvastatin (ZOCOR) 20 MG tablet, Take 1 tablet (20 mg) by mouth At Bedtime, Disp: 10 tablet, Rfl: 0     [DISCONTINUED] levothyroxine (SYNTHROID/LEVOTHROID) 50 MCG tablet, Take 1 tablet (50 mcg) by mouth daily, Disp: 10 tablet, Rfl: 0     ALLERGIES:    Allergies   Allergen Reactions     No Known Drug Allergies         SOCIAL HISTORY:   Social History     Social History     Marital status:      Spouse name: N/A     Number of children: N/A     Years of education: N/A     Occupational History     Not on file.     Social History Main Topics     Smoking status: Never Smoker     Smokeless tobacco: Never Used     Alcohol use 0.0 oz/week     0 Standard drinks or equivalent per week      Comment: occasional     Drug use: No     Sexual activity: Yes     Partners: Male     Birth control/ protection: Surgical     Other Topics  "Concern     Not on file     Social History Narrative        FAMILY HISTORY:   Family History   Problem Relation Age of Onset     CANCER Mother 45     lung cancer     CEREBROVASCULAR DISEASE Father      bulge in aorta     HEART DISEASE Maternal Grandfather      HEART DISEASE Paternal Grandfather      CANCER Sister      esophageal      CANCER Paternal Grandmother      CANCER Brother 65     Thyroid     Other Cancer Sister      lung        EXAM:Vitals: Temp 98.4  F (36.9  C) (Temporal)  Ht 5' 6\" (1.676 m)  Wt 194 lb (88 kg)  BMI 31.31 kg/m2  BMI= Body mass index is 31.31 kg/(m^2).    General appearance: Patient is alert and fully cooperative with history & exam.  No sign of distress is noted during the visit.     Psychiatric: Affect is pleasant & appropriate.  Patient appears motivated to improve health.     Respiratory: Breathing is regular & unlabored while sitting.     HEENT: Hearing is intact to spoken word.  Speech is clear.  No gross evidence of visual impairment that would impact ambulation.     Vascular: DP & PT pulses are intact & regular bilaterally.  No significant edema or varicosities noted.  CFT and skin temperature is normal to both lower extremities.     Neurologic: Lower extremity sensation is intact to light touch.  No evidence of weakness or contracture in the lower extremities.  No evidence of neuropathy.    Dermatologic: Skin is intact to both lower extremities with adequate texture, turgor and tone about the integument.  No paronychia or evidence of soft tissue infection is noted.     Musculoskeletal: Patient is nonambulatory with a fracture boot and crutches. Minimal edema noted about the midfoot. Pinpoint area of pain at the second metatarsal base. Upon axial loading and dorsiflexion of the foot and ankle and palpation of the midfoot no or minimal symptoms noted at the first or third metatarsals. No instability noted throughout the midfoot.     Radiographs nonweightbearing images 9/17 " demonstrate a fracture of the base of the second metatarsal with minimal displacement. No widening or joint diastases on lateral at the first metatarsocuneiform joint step-off about the dorsal midfoot. No signs of a midfoot dislocation.    MRI: Ordered     ASSESSMENT:       ICD-10-CM    1. Closed nondisplaced fracture of second metatarsal bone of right foot, initial encounter S92.324A         PLAN:  Reviewed [facility] in [facility].      10/4/2017   This appears to be a second metatarsal base fracture however this is a nonweightbearing film. The medial column and first metatarsocuneiform joint appears to be stable when loading the foot today. Lateral metatarsal cuneiform and cuboid joints also appear to be stable when loading them today in clinic. We discussed associated risks.  Remain strictly nonweightbearing in the fracture boot to keep the ankle at 90 even during sleep  Compression dressing when swollen or when the foot is not above heart.  Percocet prescription today  She has crutches  Discuss potential outcomes and risks associated with this fracture  Follow-up in 4 weeks to repeat radiographs.    Or MRI to evaluate for Lisfranc and mid foot injury that could not be identified on nonweightbearing imaging and she will not tolerate nonweightbearing imaging today.    Hadley Rousseau DPM

## 2017-10-04 NOTE — NURSING NOTE
"Chief Complaint   Patient presents with     Consult     minimal WB w/tall gray fx boot, pain 6 - Right 2nd metatarsal fx, DOI 9/30/2017; new pt       Initial Temp 98.4  F (36.9  C) (Temporal)  Ht 5' 6\" (1.676 m)  Wt 194 lb (88 kg)  BMI 31.31 kg/m2 Estimated body mass index is 31.31 kg/(m^2) as calculated from the following:    Height as of this encounter: 5' 6\" (1.676 m).    Weight as of this encounter: 194 lb (88 kg).  BP completed using cuff size: NA (Not Taken)  Medication Reconciliation: complete    Ada Meehan CMA, October 4, 2017    "

## 2017-10-04 NOTE — LETTER
10/4/2017         RE: Elisabeth Voss  [address]  [facility] [address]        Dear Colleague,    Thank you for referring your patient, Elisabeth Voss, to the [facility]. Please see a copy of my visit note below.    HPI:  Elisabeth Voss is a 67 year old female who is seen in consultation at the request of ED Juan - Jacob Gaines MD.    Pt presents for eval of:   (Onset, Location, L/R, Character, Treatments, Injury if yes)     XR Right foot 10/1/2017    9/30/2017 fell off hay wagon while making a turn and landed on Right foot.  Sharp and stabbing that radiates dorsal and anterior Right foot and leg, dull ache, swelling, bruising, pain 6  Minimal WB w/tall gray fracture boot 24/7 and aid of crutches, elevation, ice, aleve    Retired and active.    Weight management plan: Patient was referred to their PCP to discuss a diet and exercise plan.     ROS:  10 point ROS neg other than the symptoms noted above in the HPI.    PAST MEDICAL HISTORY:   Past Medical History:   Diagnosis Date     Cancer (H)     melanoma of cheek     History of blood transfusion     with hysterectomy many years ago     Lyme disease      Thyroid disease     partial thyroidectomy 8/2014        PAST SURGICAL HISTORY:   Past Surgical History:   Procedure Laterality Date     ARTHROSCOPY KNEE WITH MEDIAL MENISCECTOMY Left 9/20/2016    Procedure: ARTHROSCOPY KNEE WITH MEDIAL MENISCECTOMY;  Surgeon: Maximilian Ochoa DO;  Location: [facility]     BIOPSY NODE SENTINEL N/A 8/26/2014    Procedure: BIOPSY NODE SENTINEL;  Surgeon: Eliza Foss MD;  [facility]: [facility]     C TOTAL ABDOM HYSTERECTOMY  11/16/1993    TACARLITA, Wili urethropexy     COLONOSCOPY  3/22/2013    Procedure: COLONOSCOPY;  colonoscopy;  Surgeon: Mike Lr MD;  Location: [facility]     HYSTERECTOMY, PAP NO LONGER INDICATED       ORTHOPEDIC SURGERY      shoulder surgery     PHACOEMULSIFICATION WITH STANDARD INTRAOCULAR LENS IMPLANT  Left 12/8/2016    Procedure: PHACOEMULSIFICATION WITH STANDARD INTRAOCULAR LENS IMPLANT;  Surgeon: Jame Painter MD;  Location: [facility] OR     PHACOEMULSIFICATION WITH STANDARD INTRAOCULAR LENS IMPLANT Right 12/22/2016    Procedure: PHACOEMULSIFICATION WITH STANDARD INTRAOCULAR LENS IMPLANT;  Surgeon: Jame Painter MD;  Location: [facility]     RECONSTRUCT NOSE STAGE ONE N/A 9/5/2014    Procedure: RECONSTRUCT NOSE STAGE ONE;  Surgeon: Levy Holbrook MD;  [facility]     REVISE SCAR FACE N/A 8/26/2014    Procedure: REVISE SCAR FACE;  Surgeon: Eliza Foss MD;  [facility]     THYROIDECTOMY Left 8/26/2014    Procedure: THYROIDECTOMY;  Surgeon: Eliza Foss MD;  [facility]        MEDICATIONS:   Current Outpatient Prescriptions:      oxyCODONE-acetaminophen (PERCOCET) 5-325 MG per tablet, Take 1 tablet by mouth every 4 hours as needed for moderate to severe pain, Disp: 30 tablet, Rfl: 0     NAPROXEN PO, Take 660 mg by mouth 2 times daily as needed for moderate pain, Disp: , Rfl:      levothyroxine (SYNTHROID/LEVOTHROID) 50 MCG tablet, Take 1 tablet (50 mcg) by mouth daily, Disp: 90 tablet, Rfl: 1     simvastatin (ZOCOR) 20 MG tablet, TAKE ONE TABLET BY MOUTH AT BEDTIME, Disp: 90 tablet, Rfl: 1     ACETAMINOPHEN PO, Take 1,000 mg by mouth every 12 hours, Disp: , Rfl:      [DISCONTINUED] simvastatin (ZOCOR) 20 MG tablet, Take 1 tablet (20 mg) by mouth At Bedtime, Disp: 10 tablet, Rfl: 0     [DISCONTINUED] levothyroxine (SYNTHROID/LEVOTHROID) 50 MCG tablet, Take 1 tablet (50 mcg) by mouth daily, Disp: 10 tablet, Rfl: 0     ALLERGIES:    Allergies   Allergen Reactions     No Known Drug Allergies         SOCIAL HISTORY:   Social History     Social History     Marital status:      Spouse name: N/A     Number of children: N/A     Years of education: N/A     Occupational History     Not on file.     Social History Main Topics     Smoking status: Never Smoker     Smokeless  "tobacco: Never Used     Alcohol use 0.0 oz/week     0 Standard drinks or equivalent per week      Comment: occasional     Drug use: No     Sexual activity: Yes     Partners: Male     Birth control/ protection: Surgical     Other Topics Concern     Not on file     Social History Narrative        FAMILY HISTORY:   Family History   Problem Relation Age of Onset     CANCER Mother 45     lung cancer     CEREBROVASCULAR DISEASE Father      bulge in aorta     HEART DISEASE Maternal Grandfather      HEART DISEASE Paternal Grandfather      CANCER Sister      esophageal      CANCER Paternal Grandmother      CANCER Brother 65     Thyroid     Other Cancer Sister      lung        EXAM:Vitals: Temp 98.4  F (36.9  C) (Temporal)  Ht 5' 6\" (1.676 m)  Wt 194 lb (88 kg)  BMI 31.31 kg/m2  BMI= Body mass index is 31.31 kg/(m^2).    General appearance: Patient is alert and fully cooperative with history & exam.  No sign of distress is noted during the visit.     Psychiatric: Affect is pleasant & appropriate.  Patient appears motivated to improve health.     Respiratory: Breathing is regular & unlabored while sitting.     HEENT: Hearing is intact to spoken word.  Speech is clear.  No gross evidence of visual impairment that would impact ambulation.     Vascular: DP & PT pulses are intact & regular bilaterally.  No significant edema or varicosities noted.  CFT and skin temperature is normal to both lower extremities.     Neurologic: Lower extremity sensation is intact to light touch.  No evidence of weakness or contracture in the lower extremities.  No evidence of neuropathy.    Dermatologic: Skin is intact to both lower extremities with adequate texture, turgor and tone about the integument.  No paronychia or evidence of soft tissue infection is noted.     Musculoskeletal: Patient is nonambulatory with a fracture boot and crutches. Minimal edema noted about the midfoot. Pinpoint area of pain at the second metatarsal base. Upon axial " loading and dorsiflexion of the foot and ankle and palpation of the midfoot no or minimal symptoms noted at the first or third metatarsals. No instability noted throughout the midfoot.     Radiographs nonweightbearing images 9/17 demonstrate a fracture of the base of the second metatarsal with minimal displacement. No widening or joint diastases on lateral at the first metatarsocuneiform joint step-off about the dorsal midfoot. No signs of a midfoot dislocation.     ASSESSMENT:       ICD-10-CM    1. Closed nondisplaced fracture of second metatarsal bone of right foot, initial encounter S92.324A         PLAN:  Review[facility] in [facility].      10/4/2017   This appears to be a second metatarsal base fracture however this is a nonweightbearing film. The medial column and first metatarsocuneiform joint appears to be stable when loading the foot today. Lateral metatarsal cuneiform and cuboid joints also appear to be stable when loading them today in clinic. We discussed associated risks.  Remain strictly nonweightbearing in the fracture boot to keep the ankle at 90 even during sleep  Compression dressing when swollen or when the foot is not above heart.  Percocet prescription today  She has crutches  Discuss potential outcomes and risks associated with this fracture  Follow-up in 4 weeks to repeat radiographs.    Will continue to monitor for Lisfranc instability    Hadley Rousseau DPM      Again, thank you for allowing me to participate in the care of your patient.        Sincerely,        Hadley Rousseau DPM

## 2017-10-04 NOTE — MR AVS SNAPSHOT
After Visit Summary   10/4/2017    Elisabeth Voss    MRN: [id number]           Patient Information     Date Of Birth          1950        Visit Information        Provider Department      10/4/2017 4:00 PM Hadley Rousseau DPM [facility]        Today's Diagnoses     Closed nondisplaced fracture of second metatarsal bone of right foot, initial encounter    -  1      Care Instructions    Remain strictly nonweightbearing          Follow-ups after your visit        Your next 10 appointments already scheduled     Nov 01, 2017  2:30 PM CDT   [facility] with Hadley Rousseau DPM   [facility]    [address] [place] [address] [id number] [phone]            Jan 30, 2018  9:00 AM CST   Return Visit with Dorothy Castellon MD   [facility]    [address] [place] [address] [id number] [phone]              Who to contact     If you have questions or need follow up information about today's clinic visit or your schedule please contact [facility] directly at [phone].  Normal or non-critical lab and imaging results will be communicated to you by [employer], letter or phone within 4 business days after the clinic has received the results. If you do not hear from us within 7 days, please contact the clinic through [employer] or phone. If you have a critical or abnormal lab result, we will notify you by phone as soon as possible.  Submit refill requests through [employer] or call your pharmacy and they will forward the refill request to us. Please allow 3 business days for your refill to be completed.          Additional Information About Your Visit        MyChart Information     [employer] gives you secure access to your electronic health record. If you see a primary care provider, you can also send messages to your care team and make appointments. If you have questions,  "please call your primary care clinic.  If you do not have a primary care provider, please call [phone] and they will assist you.        Care EveryWhere ID     This is your Care EveryWhere ID. This could be used by other organizations to access your [facility] medical records  [id number]        Your Vitals Were     Temperature Height BMI (Body Mass Index)             98.4  F (36.9  C) (Temporal) 5' 6\" (1.676 m) 31.31 kg/m2          Blood Pressure from Last 3 Encounters:   10/01/17 130/76   07/27/17 114/73   02/27/17 110/60    Weight from Last 3 Encounters:   10/04/17 194 lb (88 kg)   10/01/17 194 lb (88 kg)   07/27/17 187 lb 9.6 oz (85.1 kg)              We Performed the Following     XR Foot Right G/E 3 Views          Today's Medication Changes          These changes are accurate as of: 10/4/17  5:03 PM.  If you have any questions, ask your nurse or doctor.               Start taking these medicines.        Dose/Directions    oxyCODONE-acetaminophen 5-325 MG per tablet   Commonly known as:  PERCOCET   Started by:  Hadley Rousseau DPM        Dose:  1 tablet   Take 1 tablet by mouth every 4 hours as needed for moderate to severe pain   Quantity:  30 tablet   Refills:  0            Where to get your medicines      Some of these will need a paper prescription and others can be bought over the counter.  Ask your nurse if you have questions.     Bring a paper prescription for each of these medications     oxyCODONE-acetaminophen 5-325 MG per tablet                Primary Care Provider Office Phone # Fax #    Yon Gallagher [phone]       [facility] [address] [place] [address]        Equal Access to Services     [facility] Hospital[facility]REW AH: Hadii erinn parrish Sorand, waaxda luqadaha, qaybta kaalmada terri lopez. So [facility] [phone].    ATENCIÓN: Si habla español, tiene a ochoa disposición servicios gratuitos de asistencia lingüística. Llame " al [phone].    We comply with applicable federal civil rights laws and [place] laws. We do not discriminate on the basis of race, color, national origin, age, disability, sex, sexual orientation, or gender identity.            Thank you!     Thank you for choosing [facility]  for your care. Our goal is always to provide you with excellent care. Hearing back from our patients is one way we can continue to improve our services. Please take a few minutes to complete the written survey that you may receive in the mail after your visit with us. Thank you!             Your Updated Medication List - Protect others around you: Learn how to safely use, store and throw away your medicines at www.disposemymeds.org.          This list is accurate as of: 10/4/17  5:03 PM.  Always use your most recent med list.                   Brand Name Dispense Instructions for use Diagnosis    ACETAMINOPHEN PO      Take 1,000 mg by mouth every 12 hours        levothyroxine 50 MCG tablet    SYNTHROID/LEVOTHROID    90 tablet    Take 1 tablet (50 mcg) by mouth daily    Other specified hypothyroidism       NAPROXEN PO      Take 660 mg by mouth 2 times daily as needed for moderate pain        oxyCODONE-acetaminophen 5-325 MG per tablet    PERCOCET    30 tablet    Take 1 tablet by mouth every 4 hours as needed for moderate to severe pain        simvastatin 20 MG tablet    ZOCOR    90 tablet    TAKE ONE TABLET BY MOUTH AT BEDTIME    Hyperlipidemia LDL goal <160

## 2017-10-05 ENCOUNTER — HOSPITAL ENCOUNTER (OUTPATIENT)
Dept: MRI IMAGING | Facility: CLINIC | Age: 67
Discharge: HOME | End: 2017-10-05
Attending: PODIATRIST | Admitting: PODIATRIST
Payer: MEDICARE

## 2017-10-05 ENCOUNTER — TELEPHONE (OUTPATIENT)
Dept: PODIATRY | Facility: CLINIC | Age: 67
End: 2017-10-05

## 2017-10-05 DIAGNOSIS — S92.324A CLOSED NONDISPLACED FRACTURE OF SECOND METATARSAL BONE OF RIGHT FOOT, INITIAL ENCOUNTER: ICD-10-CM

## 2017-10-05 PROCEDURE — 73718 MRI LOWER EXTREMITY W/O DYE: CPT | Mod: RT

## 2017-10-05 NOTE — TELEPHONE ENCOUNTER
When scheduling patient for F/U appt for MRI results, patient mentioned that she is still having a lot of pain and not sleeping with pain medication prescribed yesterday, Percocet 5-325 - 1 tablet every 4 hours as needed for pain, is not even touching the pain.    Can she get something else for pain management?    Ada Meehan CMA, October 5, 2017

## 2017-10-05 NOTE — TELEPHONE ENCOUNTER
Spoke to patient with Dr. Rousseau's recommendation below. She verbalized understanding. Ada Meehan CMA, October 5, 2017

## 2017-10-11 ENCOUNTER — TELEPHONE (OUTPATIENT)
Dept: PODIATRY | Facility: CLINIC | Age: 67
End: 2017-10-11

## 2017-10-11 NOTE — TELEPHONE ENCOUNTER
Pt has fx right toe, she is in a fracture boot. She took the ace off yesterday, then started feeling this tingly feeling in her foot and hot plantar foot.Toes were purplish color yesterday, today more blueish, skin is warm. No swelling in her leg. No pain  meds x 2days. Wearing her boot all of the time with the soft fabian cloth sock. She had taken down the air chambers in the boot yesterday.  I had her take the boot loose, as we talked her toes started feeling more normal. She is not tight in the foot section of the boot. All sym in foot not in any part of her leg. We came to the conclusion that she was pulling the straps too tight. She needs to watch how tight she gets them and the top piece of the boot and if she is too loose then she needs to add air to the chambers  To support her leg in the boot. Pt was told that sym sounded like nerve compression. She will let us know if sym continue. ESTEFANIA Crockett

## 2017-10-11 NOTE — TELEPHONE ENCOUNTER
Reason for call:  Patient reporting a symptom    Symptom or request: ever since yesterday her Rt foot feels like it's falling asleep and feels hot on the bottom of her foot. Wondering if this is normal?    Duration (how long have symptoms been present): since yesterday    Have you been treated for this before? Yes    Additional comments: none    Phone Number patient can be reached at:  Home number on file [phone] (home)    Best Time:  any    Can we leave a detailed message on this number:  YES    Call taken on 10/11/2017 at 1:10 PM by Miriam Harkins

## 2017-10-23 ENCOUNTER — OFFICE VISIT (OUTPATIENT)
Dept: PODIATRY | Facility: CLINIC | Age: 67
End: 2017-10-23
Payer: COMMERCIAL

## 2017-10-23 VITALS — BODY MASS INDEX: 31.18 KG/M2 | WEIGHT: 194 LBS | HEIGHT: 66 IN | TEMPERATURE: 97.6 F

## 2017-10-23 DIAGNOSIS — S92.324A CLOSED NONDISPLACED FRACTURE OF SECOND METATARSAL BONE OF RIGHT FOOT, INITIAL ENCOUNTER: Primary | ICD-10-CM

## 2017-10-23 PROCEDURE — 99213 OFFICE O/P EST LOW 20 MIN: CPT | Performed by: PODIATRIST

## 2017-10-23 RX ORDER — OXYCODONE AND ACETAMINOPHEN 5; 325 MG/1; MG/1
1 TABLET ORAL EVERY 4 HOURS PRN
Qty: 20 TABLET | Refills: 0 | Status: SHIPPED | OUTPATIENT
Start: 2017-10-23 | End: 2018-01-04

## 2017-10-23 ASSESSMENT — PAIN SCALES - GENERAL: PAINLEVEL: NO PAIN (0)

## 2017-10-23 NOTE — NURSING NOTE
"Chief Complaint   Patient presents with     Results     MRI Right foot 10/5/2017     RECHECK     (3 weeks, 2 days) minimal WB w/tall gray fx boot, no pain today - Right 2nd metatarsal fx, DOI 9/30/2017; LOV 10/4/2017       Initial Temp 97.6  F (36.4  C) (Temporal)  Ht 5' 6\" (1.676 m)  Wt 194 lb (88 kg)  BMI 31.31 kg/m2 Estimated body mass index is 31.31 kg/(m^2) as calculated from the following:    Height as of this encounter: 5' 6\" (1.676 m).    Weight as of this encounter: 194 lb (88 kg).  BP completed using cuff size: NA (Not Taken)  Medication Reconciliation: complete    Ada Meehan CMA, October 23, 2017    "

## 2017-10-23 NOTE — PROGRESS NOTES
"HPI:  Elisabeth Voss is a 67 year old female who is seen in consultation at the request of ED Dept - Jacob Gaines MD.    Pt presents for eval of:   (Onset, Location, L/R, Character, Treatments, Injury if yes)     XR Right foot 10/1/2017    9/30/2017 fell off hay [employer] while making a turn and landed on Right foot.  Sharp and stabbing that radiates dorsal and anterior Right foot and leg, dull ache, swelling, bruising, pain 6  Minimal WB w/tall gray fracture boot 24/7 and aid of crutches, elevation, ice, aleve    Retired and active.    Weight management plan: Patient was referred to their PCP to discuss a diet and exercise plan.      EXAM:Vitals: Temp 97.6  F (36.4  C) (Temporal)  Ht 5' 6\" (1.676 m)  Wt 194 lb (88 kg)  BMI 31.31 kg/m2  BMI= Body mass index is 31.31 kg/(m^2).    General appearance: Patient is alert and fully cooperative with history & exam.  No sign of distress is noted during the visit.     Psychiatric: Affect is pleasant & appropriate.  Patient appears motivated to improve health.     Respiratory: Breathing is regular & unlabored while sitting.     HEENT: Hearing is intact to spoken word.  Speech is clear.  No gross evidence of visual impairment that would impact ambulation.     Vascular: DP & PT pulses are intact & regular bilaterally.  No significant edema or varicosities noted.  CFT and skin temperature is normal to both lower extremities.     Neurologic: Lower extremity sensation is intact to light touch.  No evidence of weakness or contracture in the lower extremities.  No evidence of neuropathy.    Dermatologic: Skin is intact to both lower extremities with adequate texture, turgor and tone about the integument.  No paronychia or evidence of soft tissue infection is noted.     Musculoskeletal: Patient is nonambulatory with a fracture boot and crutches. Minimal edema noted about the midfoot. Pinpoint area of pain at the second metatarsal base. Upon axial loading and dorsiflexion of " the foot and ankle and palpation of the midfoot no or minimal symptoms noted at the first or third metatarsals. No instability noted throughout the midfoot.     Radiographs nonweightbearing images 9/17 demonstrate a fracture of the base of the second metatarsal with minimal displacement. No widening or joint diastases on lateral at the first metatarsocuneiform joint step-off about the dorsal midfoot. No signs of a midfoot dislocation.    MRI: Ordered     ASSESSMENT:       ICD-10-CM    1. Closed nondisplaced fracture of second metatarsal bone of right foot, initial encounter S92.324A oxyCODONE-acetaminophen (PERCOCET) 5-325 MG per tablet        PLAN:  Reviewed patient's chart in [facility].      10/4/2017   This appears to be a second metatarsal base fracture however this is a nonweightbearing film. The medial column and first metatarsocuneiform joint appears to be stable when loading the foot today. Lateral metatarsal cuneiform and cuboid joints also appear to be stable when loading them today in clinic. We discussed associated risks.  Remain strictly nonweightbearing in the fracture boot to keep the ankle at 90 even during sleep  Compression dressing when swollen or when the foot is not above heart.  Percocet prescription today  She has crutches  Discuss potential outcomes and risks associated with this fracture  Follow-up in 4 weeks to repeat radiographs.    Or MRI to evaluate for Lisfranc and mid foot injury that could not be identified on nonweightbearing imaging and she will not tolerate nonweightbearing imaging today.    10/23/2017  No edema is noted. She does still have discomfort about the first second metatarsal bases today.  Recommend continue compression dressing if any edema is noted throughout the day or week and over the next 2 weeks can begin partial protective weightbearing but not more than an hour per day. Follow-up in 3 weeks to repeat weightbearing radiographs.      Hadley Rousseau DPM

## 2017-10-23 NOTE — MR AVS SNAPSHOT
After Visit Summary   10/23/2017    Elisabeth Voss    MRN: [id number]           Patient Information     Date Of Birth          1950        Visit Information        Provider Department      10/23/2017 7:30 AM Hadley Rousseau DPM [facility]        Today's Diagnoses     Closed nondisplaced fracture of second metatarsal bone of right foot, initial encounter    -  1      Care Instructions    Follow-up in 3 weeks to repeat weightbearing radiographs.          Follow-ups after your visit        Your next 10 appointments already scheduled     Nov 13, 2017  8:15 AM CST   Return Visit with Jame Painter MD, [facility] NURSE ONLY   [facility] ([facility])    [address] [place] [address] [phone]            Nov 13, 2017  1:00 PM CST   Return Visit with Hadley Rousseau DPM   [facility] ([facility]    [address] [place] [address] [phone] [phone]            Jan 30, 2018  9:00 AM CST   Return Visit with Dorothy Castellon MD   [facility]    [address] [place] [address] [phone]              Who to contact     If you have questions or need follow up information about today's clinic visit or your schedule please contact [facility] directly at [phone].  Normal or non-critical lab and imaging results will be communicated to you by MyChart, letter or phone within 4 business days after the clinic has received the results. If you do not hear from us within 7 days, please contact the clinic through MyChart or phone. If you have a critical or abnormal lab result, we will notify you by phone as soon as possible.  Submit refill requests through [employer] or call your pharmacy and they will forward the refill request to us. Please allow 3 business days for your refill to be completed.           "Additional Information About Your Visit        MyChart Information     [employer] gives you secure access to your electronic health record. If you see a primary care provider, you can also send messages to your care team and make appointments. If you have questions, please call your primary care clinic.  If you do not have a primary care provider, please call [phone] and they will assist you.        Care EveryWhere ID     This is your Care EveryWhere ID. This could be used by other organizations to access your [place] medical records  [id number]        Your Vitals Were     Temperature Height BMI (Body Mass Index)             97.6  F (36.4  C) (Temporal) 5' 6\" (1.676 m) 31.31 kg/m2          Blood Pressure from Last 3 Encounters:   10/01/17 130/76   07/27/17 114/73   02/27/17 110/60    Weight from Last 3 Encounters:   10/23/17 194 lb (88 kg)   10/04/17 194 lb (88 kg)   10/01/17 194 lb (88 kg)              Today, you had the following     No orders found for display         Where to get your medicines      Some of these will need a paper prescription and others can be bought over the counter.  Ask your nurse if you have questions.     Bring a paper prescription for each of these medications     oxyCODONE-acetaminophen 5-325 MG per tablet          Primary Care Provider Office Phone # Fax #    Yon Gallagher [phone]       [facility] [address] [place] [address]        Equal Access to Services     [facility] Center[facility]REW AH: Hadii erinn Gudino, waaxda luqadaha, qaybta kaalmada john, terri rasmussen. So [facility] [phone].    ATENCIÓN: Si habla español, tiene a ochoa disposición servicios gratuitos de asistencia lingüística. Llame al [phone].    We comply with applicable federal civil rights laws and [place] laws. We do not discriminate on the basis of race, color, national origin, age, disability, sex, sexual orientation, or gender " identity.            Thank you!     Thank you for choosing [facility]  for your care. Our goal is always to provide you with excellent care. Hearing back from our patients is one way we can continue to improve our services. Please take a few minutes to complete the written survey that you may receive in the mail after your visit with us. Thank you!             Your Updated Medication List - Protect others around you: Learn how to safely use, store and throw away your medicines at www.disposemymeds.org.          This list is accurate as of: 10/23/17  8:08 AM.  Always use your most recent med list.                   Brand Name Dispense Instructions for use Diagnosis    ACETAMINOPHEN PO      Take 1,000 mg by mouth every 12 hours        levothyroxine 50 MCG tablet    SYNTHROID/LEVOTHROID    90 tablet    Take 1 tablet (50 mcg) by mouth daily    Other specified hypothyroidism       NAPROXEN PO      Take 660 mg by mouth 2 times daily as needed for moderate pain        oxyCODONE-acetaminophen 5-325 MG per tablet    PERCOCET    20 tablet    Take 1 tablet by mouth every 4 hours as needed for moderate to severe pain    Closed nondisplaced fracture of second metatarsal bone of right foot, initial encounter       simvastatin 20 MG tablet    ZOCOR    90 tablet    TAKE ONE TABLET BY MOUTH AT BEDTIME    Hyperlipidemia LDL goal <160

## 2017-11-13 ENCOUNTER — OFFICE VISIT (OUTPATIENT)
Dept: PODIATRY | Facility: CLINIC | Age: 67
End: 2017-11-13
Payer: COMMERCIAL

## 2017-11-13 ENCOUNTER — OFFICE VISIT (OUTPATIENT)
Dept: OPHTHALMOLOGY | Facility: CLINIC | Age: 67
End: 2017-11-13
Payer: COMMERCIAL

## 2017-11-13 ENCOUNTER — RADIANT APPOINTMENT (OUTPATIENT)
Dept: GENERAL RADIOLOGY | Facility: CLINIC | Age: 67
End: 2017-11-13
Attending: PODIATRIST
Payer: COMMERCIAL

## 2017-11-13 VITALS — HEIGHT: 66 IN | WEIGHT: 194 LBS | TEMPERATURE: 97.9 F | BODY MASS INDEX: 31.18 KG/M2

## 2017-11-13 DIAGNOSIS — H52.222 REGULAR ASTIGMATISM OF LEFT EYE: ICD-10-CM

## 2017-11-13 DIAGNOSIS — S92.324A CLOSED NONDISPLACED FRACTURE OF SECOND METATARSAL BONE OF RIGHT FOOT, INITIAL ENCOUNTER: Primary | ICD-10-CM

## 2017-11-13 DIAGNOSIS — H52.12 MYOPIA OF LEFT EYE: ICD-10-CM

## 2017-11-13 DIAGNOSIS — Z96.1 PSEUDOPHAKIA OF BOTH EYES: ICD-10-CM

## 2017-11-13 DIAGNOSIS — H02.836 DERMATOCHALASIS OF EYELIDS OF BOTH EYES: ICD-10-CM

## 2017-11-13 DIAGNOSIS — H02.833 DERMATOCHALASIS OF EYELIDS OF BOTH EYES: ICD-10-CM

## 2017-11-13 DIAGNOSIS — H40.003 GLAUCOMA SUSPECT OF BOTH EYES: Primary | ICD-10-CM

## 2017-11-13 PROCEDURE — 76514 ECHO EXAM OF EYE THICKNESS: CPT | Performed by: OPHTHALMOLOGY

## 2017-11-13 PROCEDURE — 73630 X-RAY EXAM OF FOOT: CPT | Mod: TC

## 2017-11-13 PROCEDURE — 99213 OFFICE O/P EST LOW 20 MIN: CPT | Performed by: PODIATRIST

## 2017-11-13 PROCEDURE — 92014 COMPRE OPH EXAM EST PT 1/>: CPT | Performed by: OPHTHALMOLOGY

## 2017-11-13 PROCEDURE — 92015 DETERMINE REFRACTIVE STATE: CPT | Performed by: OPHTHALMOLOGY

## 2017-11-13 ASSESSMENT — VISUAL ACUITY
OD_CC: 20/15
OS_CC: 20/15
OS_CC: 20/20
OD_CC+: -1
METHOD: SNELLEN - LINEAR
CORRECTION_TYPE: GLASSES
OD_CC: 20/20

## 2017-11-13 ASSESSMENT — REFRACTION_WEARINGRX
SPECS_TYPE: PAL
OD_AXIS: 121
OD_SPHERE: -1.00
OS_SPHERE: -1.00
OS_CYLINDER: +0.75
OS_ADD: +2.50
OD_ADD: +2.50
OS_AXIS: 044
OD_CYLINDER: +0.75

## 2017-11-13 ASSESSMENT — REFRACTION_MANIFEST
OS_AXIS: 045
OS_SPHERE: -1.00
OD_CYLINDER: +0.50
OD_ADD: +2.50
OD_SPHERE: -0.50
OD_AXIS: 105
OS_ADD: +2.50
OS_CYLINDER: +1.00

## 2017-11-13 ASSESSMENT — SLIT LAMP EXAM - LIDS
COMMENTS: DERMATOCHALASIS - UPPER LID
COMMENTS: DERMATOCHALASIS - UPPER LID

## 2017-11-13 ASSESSMENT — TONOMETRY
OD_IOP_MMHG: 17
IOP_METHOD: ICARE
OS_IOP_MMHG: 16

## 2017-11-13 ASSESSMENT — PACHYMETRY
OS_CT(UM): 555
OD_CT(UM): 561

## 2017-11-13 ASSESSMENT — EXTERNAL EXAM - LEFT EYE: OS_EXAM: NORMAL

## 2017-11-13 ASSESSMENT — EXTERNAL EXAM - RIGHT EYE: OD_EXAM: NORMAL

## 2017-11-13 ASSESSMENT — CUP TO DISC RATIO
OS_RATIO: 0.45
OD_RATIO: 0.4

## 2017-11-13 ASSESSMENT — PAIN SCALES - GENERAL: PAINLEVEL: MILD PAIN (2)

## 2017-11-13 NOTE — NURSING NOTE
"Chief Complaint   Patient presents with     RECHECK     (6 weeks, 2 days) WB w/tall gray fx boot, pain 2, XR Right foot today, 11/13/2017 - Right 2nd metatarsal fx, DOI 9/30/2017; LOV 10/23/2017       Initial Temp 97.9  F (36.6  C) (Temporal)  Ht 5' 6\" (1.676 m)  Wt 194 lb (88 kg)  BMI 31.31 kg/m2 Estimated body mass index is 31.31 kg/(m^2) as calculated from the following:    Height as of this encounter: 5' 6\" (1.676 m).    Weight as of this encounter: 194 lb (88 kg).  BP completed using cuff size: NA (Not Taken)  Medication Reconciliation: complete    Ada Meehan CMA, November 13, 2017    "

## 2017-11-13 NOTE — PROGRESS NOTES
Assessment & Plan   Elisabeth Voss is a 67 year old female who presents with:   Review of systems for the eyes was negative other than the pertinent positives and negatives noted in the HPI.  History is obtained from the patient and .     Glaucoma suspect of both eyes  - Continue yearly observation  - OCT Optic Nerve RNFL Optovue OU (both eyes)  - HVF 24-2 OU    Pseudophakia of both eyes  - IOLs well centered    Myopia of left eye  - Rx per MR for glasses (optional)  - REFRACTION    Regular astigmatism of left eye  - REFRACTION    Dermatochalasis- Bilateral  - Return for BULB eval    Return in 1 year for DFE, HVF, OCT    Documentation for today's encounter was performed by Linda Isaac COA. OSC. Acting as a scribe in my presence. I have reviewed and verified that it is an accurate recording of today's encounter.    Attending Physician Attestation:  Complete documentation of historical and exam elements from today's encounter can be found in the full encounter summary report (not reduplicated in this progress note).  I personally obtained the chief complaint(s) and history of present illness.  I confirmed and edited as necessary the review of systems, past medical/surgical history, family history, social history, and examination findings as documented by others; and I examined the patient myself.  I personally reviewed the relevant tests, images, and reports as documented above.  I formulated and edited as necessary the assessment and plan and discussed the findings and management plan with the patient and family. - Jame Painter MD

## 2017-11-13 NOTE — MR AVS SNAPSHOT
After Visit Summary   11/13/2017    Elisabeth Voss    MRN: [id number]           Patient Information     Date Of Birth          1950        Visit Information        Provider Department      11/13/2017 8:15 AM Jame Painter MD; [facility] NURSE ONLY [facility]        Today's Diagnoses     Glaucoma suspect of both eyes    -  1    Pseudophakia of both eyes        Myopia of left eye        Regular astigmatism of left eye        Dermatochalasis of eyelids of both eyes           Follow-ups after your visit        Follow-up notes from your care team     Return in about 1 year (around 11/13/2018) for Annual Eye Exam, Visual field, OCT.      Your next 10 appointments already scheduled     Nov 13, 2017  1:00 PM CST   Return Visit with Hadley Rousseau DPM   [facility] ([address] [phone]            Dec 19, 2017  7:30 AM CST   Return Visit with Jame Painter MD, [facility] O[facility] NURSE ONLY   [facility] ([facility])    [address] [place] [address] [phone]            Jan 30, 2018  9:00 AM CST   Return Visit with Dorothy Castellon MD   [facility]    [address] [place] [phone]              Who to contact     If you have questions or need follow up information about today's clinic visit or your schedule please contact [facility] directly at [phone].  Normal or non-critical lab and imaging results will be communicated to you by MyChart, letter or phone within 4 business days after the clinic has received the results. If you do not hear from us within 7 days, please contact the clinic through MyChart or phone. If you have a critical or abnormal lab result, we will notify you by phone as soon as possible.  Submit refill requests  through [employer] or call your pharmacy and they will forward the refill request to us. Please allow 3 business days for your refill to be completed.          Additional Information About Your Visit        [email or web address] Information     [employer] gives you secure access to your electronic health record. If you see a primary care provider, you can also send messages to your care team and make appointments. If you have questions, please call your primary care clinic.  If you do not have a primary care provider, please call [phone] and they will assist you.      [employer] is an electronic gateway that provides easy, online access to your medical records. With [employer], you can request a clinic appointment, read your test results, renew a prescription or communicate with your care team.     To access your existing account, please contact your [facility] or call [phone] for assistance.        Care EveryWhere ID     This is your Care EveryWhere ID. This could be used by other organizations to access your [place] medical records  [id number]         Blood Pressure from Last 3 Encounters:   10/01/17 130/76   07/27/17 114/73   02/27/17 110/60    Weight from Last 3 Encounters:   10/23/17 88 kg (194 lb)   10/04/17 88 kg (194 lb)   10/01/17 88 kg (194 lb)              We Performed the Following     HVF 24-2 OU     OCT Optic Nerve RNFL Optovue OU (both eyes)        Primary Care Provider Office Phone # Fax #    Yon Gallagher [phone]       [facility] [address] [place] [address]        Equal Access to Services     SEGUN RAMIREZ AH: Hadii aad ku hadasho Soomaali, waaxda luqadaha, qaybta kaalmada adeegyada, waxay hongin haynailan mercy munson'kamilah rasmussen. So [facility] [phone].    ATENCIÓN: Si habla español, tiene a ochoa disposición servicios gratuitos de asistencia lingüística. Llame al [phone].    We comply with applicable federal civil rights laws and [place]  laws. We do not discriminate on the basis of race, color, national origin, age, disability, sex, sexual orientation, or gender identity.            Thank you!     Thank you for choosing [facility]  for your care. Our goal is always to provide you with excellent care. Hearing back from our patients is one way we can continue to improve our services. Please take a few minutes to complete the written survey that you may receive in the mail after your visit with us. Thank you!             Your Updated Medication List - Protect others around you: Learn how to safely use, store and throw away your medicines at www.disposemymeds.org.          This list is accurate as of: 11/13/17  9:07 AM.  Always use your most recent med list.                   Brand Name Dispense Instructions for use Diagnosis    ACETAMINOPHEN PO      Take 1,000 mg by mouth every 12 hours        levothyroxine 50 MCG tablet    SYNTHROID/LEVOTHROID    90 tablet    Take 1 tablet (50 mcg) by mouth daily    Other specified hypothyroidism       NAPROXEN PO      Take 660 mg by mouth 2 times daily as needed for moderate pain        oxyCODONE-acetaminophen 5-325 MG per tablet    PERCOCET    20 tablet    Take 1 tablet by mouth every 4 hours as needed for moderate to severe pain    Closed nondisplaced fracture of second metatarsal bone of right foot, initial encounter       simvastatin 20 MG tablet    ZOCOR    90 tablet    TAKE ONE TABLET BY MOUTH AT BEDTIME    Hyperlipidemia LDL goal <160

## 2017-11-13 NOTE — NURSING NOTE
Patient presents with:  Glaucoma Suspect Follow Up: Pt denies any glaucoma gtts.    COMPREHENSIVE EYE EXAM: s/p Phaco/IOL OD 12/22/16--OS 12/8/16      Referring Provider:  Yon Gallagher MD  [place] [id number] [place] [address]    HPI    Last Eye Exam:  11/8/16   Informant(s):  EMR   Symptoms:           Do you have eye pain now?:  No      Comments:  Pt denies any eye problems.

## 2017-11-13 NOTE — MR AVS SNAPSHOT
After Visit Summary   11/13/2017    Elisabeth Voss    MRN: [id number]           Patient Information     Date Of Birth          1950        Visit Information        Provider Department      11/13/2017 1:00 PM Hadley Rousseau DPM [facility]        Today's Diagnoses     Closed nondisplaced fracture of second metatarsal bone of right foot, initial encounter    -  1      Care Instructions    Follow-up in 3 weeks with a continued symptoms otherwise as needed          Follow-ups after your visit        Your next 10 appointments already scheduled     Nov 13, 2017  1:00 PM CST   [facility]   [facility]    [address] [place] [address] [phone]            Dec 19, 2017  7:30 AM CST   Return Visit with Jame Painter MD, [facility]    [address] [place] [address] [phone]            Jan 30, 2018  9:00 AM CST   Return Visit with Dorothy Castellon MD   [facility]    [address] [place] [phone]              Who to contact     If you have questions or need follow up information about today's clinic visit or your schedule please contact [facility] directly at [phone].  Normal or non-critical lab and imaging results will be communicated to you by MyChart, letter or phone within 4 business days after the clinic has received the results. If you do not hear from us within 7 days, please contact the clinic through MyChart or phone. If you have a critical or abnormal lab result, we will notify you by phone as soon as possible.  Submit refill requests through [employer] or call your pharmacy and they will forward the refill request to us. Please allow 3 business days for your refill to be completed.     "      Additional Information About Your Visit        MyChart Information     [employer] gives you secure access to your electronic health record. If you see a primary care provider, you can also send messages to your care team and make appointments. If you have questions, please call your primary care clinic.  If you do not have a primary care provider, please call [phone] and they will assist you.        Care EveryWhere ID     This is your Care EveryWhere ID. This could be used by other organizations to access your [place] medical records  [id number]        Your Vitals Were     Temperature Height BMI (Body Mass Index)             97.9  F (36.6  C) (Temporal) 5' 6\" (1.676 m) 31.31 kg/m2          Blood Pressure from Last 3 Encounters:   10/01/17 130/76   07/27/17 114/73   02/27/17 110/60    Weight from Last 3 Encounters:   11/13/17 194 lb (88 kg)   10/23/17 194 lb (88 kg)   10/04/17 194 lb (88 kg)              Today, you had the following     No orders found for display       Primary Care Provider Office Phone # Fax #    Yon Gallagher [phone]       [facility] [address] [place] [address]        Equal Access to Services     DEE DEE RAMIREZ AH: Hadii aad ku hadasho Soomaali, waaxda luqadaha, qaybta kaalmada adeegyada, waxay idiin haynailan mercy allen lamayo ah. So [facility] [phone].    ATENCIÓN: Si habla español, tiene a ochoa disposición servicios gratuitos de asistencia lingüística. Llame al [phone].    We comply with applicable federal civil rights laws and [place] laws. We do not discriminate on the basis of race, color, national origin, age, disability, sex, sexual orientation, or gender identity.            Thank you!     Thank you for choosing [facility]  for your care. Our goal is always to provide you with excellent care. Hearing back from our patients is one way we can continue to improve our services. Please take a few minutes to complete the " written survey that you may receive in the mail after your visit with us. Thank you!             Your Updated Medication List - Protect others around you: Learn how to safely use, store and throw away your medicines at www.disposemymeds.org.          This list is accurate as of: 11/13/17 12:27 PM.  Always use your most recent med list.                   Brand Name Dispense Instructions for use Diagnosis    ACETAMINOPHEN PO      Take 1,000 mg by mouth every 12 hours        levothyroxine 50 MCG tablet    SYNTHROID/LEVOTHROID    90 tablet    Take 1 tablet (50 mcg) by mouth daily    Other specified hypothyroidism       NAPROXEN PO      Take 660 mg by mouth 2 times daily as needed for moderate pain        oxyCODONE-acetaminophen 5-325 MG per tablet    PERCOCET    20 tablet    Take 1 tablet by mouth every 4 hours as needed for moderate to severe pain    Closed nondisplaced fracture of second metatarsal bone of right foot, initial encounter       simvastatin 20 MG tablet    ZOCOR    90 tablet    TAKE ONE TABLET BY MOUTH AT BEDTIME    Hyperlipidemia LDL goal <160

## 2017-12-19 ENCOUNTER — TELEPHONE (OUTPATIENT)
Dept: OPHTHALMOLOGY | Facility: CLINIC | Age: 67
End: 2017-12-19

## 2017-12-19 ENCOUNTER — OFFICE VISIT (OUTPATIENT)
Dept: OPHTHALMOLOGY | Facility: CLINIC | Age: 67
End: 2017-12-19
Payer: COMMERCIAL

## 2017-12-19 DIAGNOSIS — H02.836 DERMATOCHALASIS OF EYELIDS OF BOTH EYES: Primary | ICD-10-CM

## 2017-12-19 DIAGNOSIS — H02.833 DERMATOCHALASIS OF EYELIDS OF BOTH EYES: Primary | ICD-10-CM

## 2017-12-19 PROCEDURE — 92012 INTRM OPH EXAM EST PATIENT: CPT | Performed by: OPHTHALMOLOGY

## 2017-12-19 PROCEDURE — 92081 LIMITED VISUAL FIELD XM: CPT | Performed by: OPHTHALMOLOGY

## 2017-12-19 ASSESSMENT — VISUAL ACUITY
OD_CC: 20/20
OD_CC+: +3
METHOD: SNELLEN - LINEAR
CORRECTION_TYPE: GLASSES

## 2017-12-19 ASSESSMENT — PACHYMETRY
OD_CT(UM): 561
OS_CT(UM): 555

## 2017-12-19 ASSESSMENT — EXTERNAL EXAM - LEFT EYE: OS_EXAM: NORMAL

## 2017-12-19 ASSESSMENT — SLIT LAMP EXAM - LIDS
COMMENTS: DERMATOCHALASIS - UPPER LID
COMMENTS: DERMATOCHALASIS - UPPER LID

## 2017-12-19 ASSESSMENT — EXTERNAL EXAM - RIGHT EYE: OD_EXAM: NORMAL

## 2017-12-19 NOTE — NURSING NOTE
Patient presents with:  Dermatochalasis Evaluation      Referring Provider:  No referring provider defined for this encounter.    HPI    Affected eye(s):  Both   Location:  Upper   Symptoms:     Blurred vision   Decreased vision      Frequency:  Daily, Constant       Do you have eye pain now?:  No      Comments:  Feels like she is squinting all the time.

## 2017-12-19 NOTE — TELEPHONE ENCOUNTER
"Procedure: bilateral Blephroplasty  Facility: [facility]  Length: 1.0 hour(s)  Surgeon:Kamar Painter MD  Anesthesia: Local with MAC  Diagnosis: Dermatochalasis  Out Patient or AM admit:  (Same day)  BMI:Estimated body mass index is 31.31 kg/(m^2) as calculated from the following:    Height as of 11/13/17: 1.676 m (5' 6\").    Weight as of 11/13/17: 88 kg (194 lb). (If over 43 for general or 45 for MAC cannot be scheduled at [facility])   Pre-op Appointments needed: History & Physical within 30 days of surgery  Post-op appointments needed: Dermatochalasis1 week -3weeks   needed:No   Surgery packet/instructions given to patient?:  Yes  Pre op teaching done:  Yes  Lens Orders Needed: No   Referring provider:   Special Considerations:   Linda JALLOH OSC            "

## 2017-12-19 NOTE — MR AVS SNAPSHOT
After Visit Summary   12/19/2017    Elisabeth Voss    MRN: [id number]           Patient Information     Date Of Birth          1950        Visit Information        Provider Department      12/19/2017 7:30 AM Jame Painter MD; [facility] NURSE ONLY [facility]        Today's Diagnoses     Dermatochalasis    -  1       Follow-ups after your visit        Follow-up notes from your care team     Return for Surgery.      Your next 10 appointments already scheduled     Jan 23, 2018  8:15 AM CST   Return Visit with Jame Painter MD   [facility] ([facility]    [id number] [place] [id number] [phone]            Jan 30, 2018  9:00 AM CST   [facility] with [facility]ellon [facility]   [facility]    [address] [facility] [id number] [phone]              Who to contact     If you have questions or need follow up information about today's clinic visit or your schedule please contact [facility] directly at [phone].  Normal or non-critical lab and imaging results will be communicated to you by MyChart, letter or phone within 4 business days after the clinic has received the results. If you do not hear from us within 7 days, please contact the clinic through [employer] or phone. If you have a critical or abnormal lab result, we will notify you by phone as soon as possible.  Submit refill requests through Write[employer] or call your pharmacy and they will forward the refill request to us. Please allow 3 business days for your refill to be completed.          Additional Information About Your Visit        MyChart Information     WritePath gives you secure access to your electronic health record. If you see a primary care provider, you can also send messages to your care team and make appointments. If you have questions, please call your primary care  clinic.  If you do not have a primary care provider, please call [phone] and they will assist you.      NGI is an electronic gateway that provides easy, online access to your medical records. With NGI, you can request a clinic appointment, read your test results, renew a prescription or communicate with your care team.     To access your existing account, please contact your [facility] or call [phone] for assistance.        Care EveryWhere ID     This is your Care EveryWhere ID. This could be used by other organizations to access your [facility] medical records  [id number]         Blood Pressure from Last 3 Encounters:   10/01/17 130/76   07/27/17 114/73   02/27/17 110/60    Weight from Last 3 Encounters:   11/13/17 88 kg (194 lb)   10/23/17 88 kg (194 lb)   10/04/17 88 kg (194 lb)              We Performed the Following     Fundus Photos OU (both eyes)     Kinetic Ptosis OU        Primary Care Provider Office Phone # Fax #    Yon Gallagher [phone]       [facility] [address] [place] [address]        Equal Access to Services     [facility]: Hadii aad ku hadasho Soomaali, waaxda luqadaha, qaybta kaalmada adeegyada, waxay idiin hayaan adeelder quiñonez . So [facility] [phone].    ATENCIÓN: Si habla español, tiene a ochoa disposición servicios gratuitos de asistencia lingüística. Llame al [phone].    We comply with applicable federal civil rights laws and [place] laws. We do not discriminate on the basis of race, color, national origin, age, disability, sex, sexual orientation, or gender identity.            Thank you!     Thank you for choosing [facility]  for your care. Our goal is always to provide you with excellent care. Hearing back from our patients is one way we can continue to improve our services. Please take a few minutes to complete the written survey that you may receive in the mail  after your visit with us. Thank you!             Your Updated Medication List - Protect others around you: Learn how to safely use, store and throw away your medicines at www.disposemymeds.org.          This list is accurate as of: 12/19/17  8:04 AM.  Always use your most recent med list.                   Brand Name Dispense Instructions for use Diagnosis    ACETAMINOPHEN PO      Take 1,000 mg by mouth every 12 hours        levothyroxine 50 MCG tablet    SYNTHROID/LEVOTHROID    90 tablet    Take 1 tablet (50 mcg) by mouth daily    Other specified hypothyroidism       NAPROXEN PO      Take 660 mg by mouth 2 times daily as needed for moderate pain        oxyCODONE-acetaminophen 5-325 MG per tablet    PERCOCET    20 tablet    Take 1 tablet by mouth every 4 hours as needed for moderate to severe pain    Closed nondisplaced fracture of second metatarsal bone of right foot, initial encounter       simvastatin 20 MG tablet    ZOCOR    90 tablet    TAKE ONE TABLET BY MOUTH AT BEDTIME    Hyperlipidemia LDL goal <160

## 2017-12-19 NOTE — PROGRESS NOTES
Patient: Elisabeth Voss MRN# [id number]   YOB: 1950 Age: 67 year old   Date of Visit: Dec 19, 2017         CC:     1-Droopy eyelids obstructing vision. Here today  for evaluation for possible BULB surgery. With HVF testing (taped and untaped)  2- Shereports decreased superior and peripheral fields of view.  3- HA's and forehead fatigue.       HPI:     Elisabeth Voss is a 67 year old female who has noted gradual onset of droopy eyelids over the past years. The droopy eyelid is interfering with activities of daily living including driving, and reading. The patient denies double vision, variability of the eyelid position, or dry eye symptoms.          Assessment and Plan:   1. Dermatochalasis Bilateral upper eyelids    Elisabeth Voss has visually significant malposition of the upper eyelids both by symptoms and visual field testing. Visual fields reveal the superior field to be limited to 0 degrees on the right, which improves  40 degrees after manual elevation, and 10 degrees on the left, which improves  30 degrees after manual elevation. Photographs obtained document the eyelid position.    Appropriate candidate for surgery: Bilateral upper eyelid blepharoplasty. Prior Authorization will be initiated and procedure will be scheduled after receiving approval.    We discussed risks benefits and alternatives to the proposed procedure. All patient questions were answered.     Documentation for today's encounter was performed by Linda FARMER OSC. Acting as a scribe in my presence. I have reviewed and verified that it is an accurate recording of today's encounter.

## 2017-12-20 NOTE — TELEPHONE ENCOUNTER
Date Scheduled: 1-11-18  Facility: [facility]  Surgeon: Dr. Painter   Post-op appointment scheduled:    Jan 23, 2018  8:15 AM CST   Return Visit with Jame Painter MD   [facility]    [address] [place] [id number] [phone]           scheduled?: No  Surgery packet/instructions confirmed received?  Yes  Special Considerations:   Toña Kaufman, Surgery Scheduling Coordinator

## 2018-01-05 ENCOUNTER — OFFICE VISIT (OUTPATIENT)
Dept: INTERNAL MEDICINE | Facility: CLINIC | Age: 68
End: 2018-01-05
Payer: COMMERCIAL

## 2018-01-05 VITALS
RESPIRATION RATE: 16 BRPM | HEART RATE: 82 BPM | OXYGEN SATURATION: 98 % | DIASTOLIC BLOOD PRESSURE: 66 MMHG | WEIGHT: 189 LBS | TEMPERATURE: 97.6 F | BODY MASS INDEX: 31.45 KG/M2 | SYSTOLIC BLOOD PRESSURE: 106 MMHG

## 2018-01-05 DIAGNOSIS — Z01.818 PREOP GENERAL PHYSICAL EXAM: Primary | ICD-10-CM

## 2018-01-05 DIAGNOSIS — H02.403 EYELID DROOPING DISEASE, BILATERAL: ICD-10-CM

## 2018-01-05 PROCEDURE — 99214 OFFICE O/P EST MOD 30 MIN: CPT | Performed by: INTERNAL MEDICINE

## 2018-01-05 ASSESSMENT — PAIN SCALES - GENERAL: PAINLEVEL: NO PAIN (0)

## 2018-01-05 NOTE — MR AVS SNAPSHOT
After Visit Summary   1/5/2018    Elisabeth Voss    MRN: [id number]           Patient Information     Date Of Birth          1950        Visit Information        Provider Department      1/5/2018 7:30 AM Yon Gallagher MD [facility]        Today's Diagnoses     Preop general physical exam    -  1      Care Instructions      Before Your Surgery      Call your surgeon if there is any change in your health. This includes signs of a cold or flu (such as a sore throat, runny nose, cough, rash or fever).    Do not smoke, drink alcohol or take over the counter medicine (unless your surgeon or primary care doctor tells you to) for the 24 hours before and after surgery.    If you take prescribed drugs: Follow your doctor s orders about which medicines to take and which to stop until after surgery.    Eating and drinking prior to surgery: follow the instructions from your surgeon    Take a shower or bath the night before surgery. Use the soap your surgeon gave you to gently clean your skin. If you do not have soap from your surgeon, use your regular soap. Do not shave or scrub the surgery site.  Wear clean pajamas and have clean sheets on your bed.           Follow-ups after your visit        Your next 10 appointments already scheduled     Jan 11, 2018   Procedure with Jame Painter MD   [facility] (--)    [address] [place] [id number]   [phone]            Jan 23, 2018  8:15 AM CST   Return Visit with Jame Painter MD   [facility]    [address] [place] [phone]   [phone]            Jan 30, 2018  9:00 AM CST   Return Visit with Dorothy Castellon MD   [facility]    [address] [facility] [address] [phone]              Who to contact     If you have questions or need follow up information about  today's clinic visit or your schedule please contact [facility] directly at [phone].  Normal or non-critical lab and imaging results will be communicated to you by MyChart, letter or phone within 4 business days after the clinic has received the results. If you do not hear from us within 7 days, please contact the clinic through [employer] or phone. If you have a critical or abnormal lab result, we will notify you by phone as soon as possible.  Submit refill requests through [employer] or call your pharmacy and they will forward the refill request to us. Please allow 3 business days for your refill to be completed.          Additional Information About Your Visit        [employer] Information     [employer] gives you secure access to your electronic health record. If you see a primary care provider, you can also send messages to your care team and make appointments. If you have questions, please call your primary care clinic.  If you do not have a primary care provider, please call [phone] and they will assist you.        Care EveryWhere ID     This is your Care EveryWhere ID. This could be used by other organizations to access your [facility] medical records  [id number]        Your Vitals Were     Pulse Temperature Respirations Pulse Oximetry BMI (Body Mass Index)       82 97.6  F (36.4  C) (Temporal) 16 98% 31.45 kg/m2        Blood Pressure from Last 3 Encounters:   01/05/18 106/66   10/01/17 130/76   07/27/17 114/73    Weight from Last 3 Encounters:   01/05/18 189 lb (85.7 kg)   01/04/18 189 lb (85.7 kg)   11/13/17 194 lb (88 kg)              Today, you had the following     No orders found for display         Today's Medication Changes          These changes are accurate as of: 1/5/18  7:32 AM.  If you have any questions, ask your nurse or doctor.               Stop taking these medicines if you haven't already. Please contact your care team if you have questions.     ACETAMINOPHEN PO   Stopped by:   Yon Gallagher MD                    Primary Care Provider Office Phone # Fax #    Yon Gallagher [phone]       [facility] [address] [place] [address]        Equal Access to Services     ARUNSEGUN ASHLEY : Hadii aad ku hadjameso Soomaali, waaxda luqadaha, qaybta kaalmada adeegyada, waxay idiin haynailan adeelder allen lakaitlinn valery. So [facility] [phone].    ATENCIÓN: Si habla español, tiene a ochoa disposición servicios gratuitos de asistencia lingüística. Llame al [phone].    We comply with applicable federal civil rights laws and [place] laws. We do not discriminate on the basis of race, color, national origin, age, disability, sex, sexual orientation, or gender identity.            Thank you!     Thank you for choosing [facility]  for your care. Our goal is always to provide you with excellent care. Hearing back from our patients is one way we can continue to improve our services. Please take a few minutes to complete the written survey that you may receive in the mail after your visit with us. Thank you!             Your Updated Medication List - Protect others around you: Learn how to safely use, store and throw away your medicines at www.disposemymeds.org.          This list is accurate as of: 1/5/18  7:32 AM.  Always use your most recent med list.                   Brand Name Dispense Instructions for use Diagnosis    levothyroxine 50 MCG tablet    SYNTHROID/LEVOTHROID    90 tablet    Take 1 tablet (50 mcg) by mouth daily    Other specified hypothyroidism       NAPROXEN PO      Take 660 mg by mouth 2 times daily as needed for moderate pain        simvastatin 20 MG tablet    ZOCOR    90 tablet    TAKE ONE TABLET BY MOUTH AT BEDTIME    Hyperlipidemia LDL goal <160

## 2018-01-05 NOTE — NURSING NOTE
"Chief Complaint   Patient presents with     Pre-Op Exam       Initial /66  Pulse 82  Temp 97.6  F (36.4  C) (Temporal)  Resp 16  Wt 189 lb (85.7 kg)  SpO2 98%  BMI 31.45 kg/m2 Estimated body mass index is 31.45 kg/(m^2) as calculated from the following:    Height as of 1/4/18: 5' 5\" (1.651 m).    Weight as of this encounter: 189 lb (85.7 kg).  Medication Reconciliation: complete    "

## 2018-01-05 NOTE — PROGRESS NOTES
[address] [address]  [phone]  Dept: [phone]    PRE-OP EVALUATION:  Today's date: 2018    Elisabeth Voss (: 1950) presents for pre-operative evaluation assessment as requested by Dr. Painter.  She requires evaluation and anesthesia risk assessment prior to undergoing surgery/procedure for treatment of eylelids .  Proposed procedure: bilateral blepharoplasty    Date of Surgery/ Procedure: 18  Time of Surgery/ Procedure:   Hospital/Surgical Facility: [facility]  Fax number for surgical facility:   Primary Physician: Yon Gallagher  Type of Anesthesia Anticipated: to be determined    Patient has a Health Care Directive or Living Will:  NO    Preop Questions 2018   1.  Do you have a history of heart attack, stroke, stent, bypass or surgery on an artery in the head, neck, heart or legs? No   2.  Do you ever have any pain or discomfort in your chest? No   3.  Do you have a history of  Heart Failure? No   4.   Are you troubled by shortness of breath when:  walking on a level surface, or up a slight hill, or at night? No   5.  Do you currently have a cold, bronchitis or other respiratory infection? No   6.  Do you have a cough, shortness of breath, or wheezing? No   7.  Do you sometimes get pains in the calves of your legs when you walk? No   8. Do you or anyone in your family have previous history of blood clots? No   9.  Do you or does anyone in your family have a serious bleeding problem such as prolonged bleeding following surgeries or cuts? No   10. Have you ever had problems with anemia or been told to take iron pills? No   11. Have you had any abnormal blood loss such as black, tarry or bloody stools, or abnormal vaginal bleeding? No   12. Have you ever had a blood transfusion? YES -   13. Have you or any of your relatives ever had problems with anesthesia? No   14. Do you have sleep apnea, excessive snoring or daytime  drowsiness? No   15. Do you have any prosthetic heart valves? No   16. Do you have prosthetic joints? No   17. Is there any chance that you may be pregnant? No           HPI:                                                      Brief HPI related to upcoming procedure: eyelids are drooping, bothering her vision, has to hold head too high.        HYPERLIPIDEMIA - Patient has a long history of significant Hyperlipidemia requiring medication for treatment with recent good control. Patient reports no problems or side effects with the medication.                                                                                                                                                       .  HYPOTHYROIDISM - Patient has a longstanding history of chronic Hypothyroidism. Patient has been doing well, noting no tremor, insomnia, hair loss or changes in skin texture. Last TSH value of 0.79. Continues to take medications as directed, without adverse reactions or side effects.                                                                                                                                                                                                                        .    MEDICAL HISTORY:                                                    Patient Active Problem List    Diagnosis Date Noted     Pseudophakia of both eyes 01/06/2017     Priority: Medium     Cataracts, bilateral 11/08/2016     Priority: Medium     Glaucoma suspect 11/08/2016     Priority: Medium     Epiretinal membrane (ERM) of right eye 11/08/2016     Priority: Medium     Myopia, bilateral 11/08/2016     Priority: Medium     Astigmatism, bilateral 11/08/2016     Priority: Medium     Tear of medial meniscus of knee, left, initial encounter 09/07/2016     Priority: Medium     Chondromalacia of patellofemoral joint, left 08/03/2016     Priority: Medium     Meningioma (H) 08/06/2015     Priority: Medium     Small right frontal meningioma.          Other specified hypothyroidism 06/03/2015     Priority: Medium     Malignant melanoma of skin of face (H) 06/26/2014     Priority: Medium     Hyperlipidemia LDL goal <160 01/21/2013     Priority: Medium     Advanced directives, counseling/discussion 12/11/2012     Priority: Medium     Discussed advance care planning with patient; information given to patient to review.Radha Real, [facility]  12/11/2012   Advanced directive planning/honoring choices packet information given to patient to review at time of appointment.  Pete Earnestine, [facility]  8/11/2014            Past Medical History:   Diagnosis Date     Cancer (H)     melanoma of cheek     History of blood transfusion     with hysterectomy many years ago     Lyme disease      Thyroid disease     partial thyroidectomy 8/2014     Past Surgical History:   Procedure Laterality Date     ARTHROSCOPY KNEE WITH MEDIAL MENISCECTOMY Left 9/20/2016    Procedure: ARTHROSCOPY KNEE WITH MEDIAL MENISCECTOMY;  Surgeon: Maximilian Ochoa DO;  Location: [facility] OR     BIOPSY NODE SENTINEL N/A 8/26/2014    Procedure: BIOPSY NODE SENTINEL;  Surgeon: Eliza Foss MD;  Location: [facility] OR     C TOTAL ABDOM HYSTERECTOMY  11/16/1993    TAHRSO, Rasheed urethropexy     CATARACT IOL, RT/LT Bilateral OD 12/22/16-OS 12/8/16     COLONOSCOPY  3/22/2013    Procedure: COLONOSCOPY;  colonoscopy;  Surgeon: Mike Lr MD;  Location: [facility] GI     HYSTERECTOMY, PAP NO LONGER INDICATED       ORTHOPEDIC SURGERY      shoulder surgery     PHACOEMULSIFICATION WITH STANDARD INTRAOCULAR LENS IMPLANT Left 12/8/2016    Procedure: PHACOEMULSIFICATION WITH STANDARD INTRAOCULAR LENS IMPLANT;  Surgeon: Jame Painter MD;  Location: [facility] OR     PHACOEMULSIFICATION WITH STANDARD INTRAOCULAR LENS IMPLANT Right 12/22/2016    Procedure: PHACOEMULSIFICATION WITH STANDARD INTRAOCULAR LENS IMPLANT;  Surgeon: Jame Painter MD;  Location: [facility] OR     RECONSTRUCT NOSE STAGE ONE N/A 9/5/2014     Procedure: RECONSTRUCT NOSE STAGE ONE;  Surgeon: Levy Holbrook MD;  [facility] OR     REVISE SCAR FACE N/A 8/26/2014    Procedure: REVISE SCAR FACE;  Surgeon: Eliza Foss MD;  [facility] OR     THYROIDECTOMY Left 8/26/2014    Procedure: THYROIDECTOMY;  Surgeon: Eliza Foss MD;  Location: [facility] OR     Current Outpatient Prescriptions   Medication Sig Dispense Refill     NAPROXEN PO Take 660 mg by mouth 2 times daily as needed for moderate pain       levothyroxine (SYNTHROID/LEVOTHROID) 50 MCG tablet Take 1 tablet (50 mcg) by mouth daily 90 tablet 1     simvastatin (ZOCOR) 20 MG tablet TAKE ONE TABLET BY MOUTH AT BEDTIME 90 tablet 1     OTC products: None, except as noted above    Allergies   Allergen Reactions     No Known Drug Allergies       Latex Allergy: NO    Social History   Substance Use Topics     Smoking status: Never Smoker     Smokeless tobacco: Never Used     Alcohol use 0.0 oz/week     0 Standard drinks or equivalent per week      Comment: occasional     History   Drug Use No       REVIEW OF SYSTEMS:                                                    Constitutional, neuro, ENT, endocrine, pulmonary, cardiac, gastrointestinal, genitourinary, musculoskeletal, integument and psychiatric systems are negative, except as otherwise noted.      EXAM:                                                    /66  Pulse 82  Temp 97.6  F (36.4  C) (Temporal)  Resp 16  Wt 189 lb (85.7 kg)  SpO2 98%  BMI 31.45 kg/m2    GENERAL APPEARANCE: healthy, alert and no distress     EYES: EOMI, PERRL     HENT: ear canals and TM's normal and nose and mouth without ulcers or lesions     NECK: no adenopathy, no asymmetry, masses, or scars and thyroid normal to palpation     RESP: lungs clear to auscultation - no rales, rhonchi or wheezes     CV: regular rates and rhythm, normal S1 S2, no S3 or S4 and no murmur, click or rub     ABDOMEN:  soft, nontender, no HSM or masses and bowel sounds normal      MS: extremities normal- no gross deformities noted, no evidence of inflammation in joints, FROM in all extremities.     SKIN: no suspicious lesions or rashes     NEURO: Normal strength and tone, sensory exam grossly normal, mentation intact and speech normal    DIAGNOSTICS:                                                    No labs or EKG required for low risk surgery (cataract, skin procedure, breast biopsy, etc)    Recent Labs   Lab Test  07/21/17   0753  01/26/17   0733   HGB  14.1  14.6   PLT  229  262   NA  143  143   POTASSIUM  4.3  3.9   CR  1.02  1.06*      EKG in 2016 was stable  Labs in 2017 were normal, no diuretics.     IMPRESSION:                                                    Reason for surgery/procedure: eyelid drooping    The proposed surgical procedure is considered LOW risk.    REVISED CARDIAC RISK INDEX  The patient has the following serious cardiovascular risks for perioperative complications such as (MI, PE, VFib and 3  AV Block):  No serious cardiac risks  INTERPRETATION: 1 risks: Class II (low risk - 0.9% complication rate)    The patient has the following additional risks for perioperative complications:  No identified additional risks    No diagnosis found.    RECOMMENDATIONS:                                                          --Patient is to take all scheduled medications on the day of surgery EXCEPT for modifications listed below.    APPROVAL GIVEN to proceed with proposed procedure, without further diagnostic evaluation       Signed Electronically by: Yon Gallagher MD    Copy of this evaluation report is provided to requesting physician.    [facility] Preop Guidelines

## 2018-01-10 ENCOUNTER — ANESTHESIA EVENT (OUTPATIENT)
Dept: SURGERY | Facility: AMBULATORY SURGERY CENTER | Age: 68
End: 2018-01-10

## 2018-01-11 ENCOUNTER — SURGERY (OUTPATIENT)
Age: 68
End: 2018-01-11
Payer: COMMERCIAL

## 2018-01-11 ENCOUNTER — HOSPITAL ENCOUNTER (OUTPATIENT)
Facility: AMBULATORY SURGERY CENTER | Age: 68
Discharge: HOME | End: 2018-01-11
Attending: OPHTHALMOLOGY | Admitting: OPHTHALMOLOGY
Payer: COMMERCIAL

## 2018-01-11 ENCOUNTER — ANESTHESIA (OUTPATIENT)
Dept: SURGERY | Facility: AMBULATORY SURGERY CENTER | Age: 68
End: 2018-01-11
Payer: COMMERCIAL

## 2018-01-11 VITALS
DIASTOLIC BLOOD PRESSURE: 74 MMHG | OXYGEN SATURATION: 98 % | SYSTOLIC BLOOD PRESSURE: 131 MMHG | BODY MASS INDEX: 31.49 KG/M2 | RESPIRATION RATE: 16 BRPM | TEMPERATURE: 98.4 F | WEIGHT: 189 LBS | HEIGHT: 65 IN

## 2018-01-11 DIAGNOSIS — Z98.890 S/P BILATERAL BLEPHAROPLASTY: Primary | ICD-10-CM

## 2018-01-11 DIAGNOSIS — Z98.890 S/P BLEPHAROPLASTY: Primary | ICD-10-CM

## 2018-01-11 PROCEDURE — G8907 PT DOC NO EVENTS ON DISCHARG: HCPCS

## 2018-01-11 PROCEDURE — 15823 BLEPHARP UPR EYELID XCSV SKN: CPT | Mod: 50

## 2018-01-11 PROCEDURE — 15823 BLEPHARP UPR EYELID XCSV SKN: CPT | Mod: 50 | Performed by: OPHTHALMOLOGY

## 2018-01-11 PROCEDURE — G8918 PT W/O PREOP ORDER IV AB PRO: HCPCS

## 2018-01-11 RX ORDER — ONDANSETRON 4 MG/1
4 TABLET, ORALLY DISINTEGRATING ORAL EVERY 30 MIN PRN
Status: DISCONTINUED | OUTPATIENT
Start: 2018-01-11 | End: 2018-01-12 | Stop reason: HOSPADM

## 2018-01-11 RX ORDER — SODIUM CHLORIDE, SODIUM LACTATE, POTASSIUM CHLORIDE, CALCIUM CHLORIDE 600; 310; 30; 20 MG/100ML; MG/100ML; MG/100ML; MG/100ML
INJECTION, SOLUTION INTRAVENOUS CONTINUOUS
Status: DISCONTINUED | OUTPATIENT
Start: 2018-01-11 | End: 2018-01-12 | Stop reason: HOSPADM

## 2018-01-11 RX ORDER — MEPERIDINE HYDROCHLORIDE 25 MG/ML
12.5 INJECTION INTRAMUSCULAR; INTRAVENOUS; SUBCUTANEOUS
Status: DISCONTINUED | OUTPATIENT
Start: 2018-01-11 | End: 2018-01-12 | Stop reason: HOSPADM

## 2018-01-11 RX ORDER — LIDOCAINE 40 MG/G
CREAM TOPICAL
Status: DISCONTINUED | OUTPATIENT
Start: 2018-01-11 | End: 2018-01-12 | Stop reason: HOSPADM

## 2018-01-11 RX ORDER — ACETAMINOPHEN 325 MG/1
975 TABLET ORAL ONCE
Status: COMPLETED | OUTPATIENT
Start: 2018-01-11 | End: 2018-01-11

## 2018-01-11 RX ORDER — ONDANSETRON 2 MG/ML
4 INJECTION INTRAMUSCULAR; INTRAVENOUS EVERY 30 MIN PRN
Status: DISCONTINUED | OUTPATIENT
Start: 2018-01-11 | End: 2018-01-12 | Stop reason: HOSPADM

## 2018-01-11 RX ORDER — FENTANYL CITRATE 50 UG/ML
25-50 INJECTION, SOLUTION INTRAMUSCULAR; INTRAVENOUS EVERY 5 MIN PRN
Status: DISCONTINUED | OUTPATIENT
Start: 2018-01-11 | End: 2018-01-12 | Stop reason: HOSPADM

## 2018-01-11 RX ORDER — TETRACAINE HYDROCHLORIDE 5 MG/ML
SOLUTION OPHTHALMIC PRN
Status: DISCONTINUED | OUTPATIENT
Start: 2018-01-11 | End: 2018-01-11 | Stop reason: HOSPADM

## 2018-01-11 RX ORDER — KETAMINE HYDROCHLORIDE 10 MG/ML
INJECTION, SOLUTION INTRAMUSCULAR; INTRAVENOUS PRN
Status: DISCONTINUED | OUTPATIENT
Start: 2018-01-11 | End: 2018-01-11

## 2018-01-11 RX ORDER — ONDANSETRON 2 MG/ML
INJECTION INTRAMUSCULAR; INTRAVENOUS PRN
Status: DISCONTINUED | OUTPATIENT
Start: 2018-01-11 | End: 2018-01-11

## 2018-01-11 RX ORDER — NALOXONE HYDROCHLORIDE 0.4 MG/ML
.1-.4 INJECTION, SOLUTION INTRAMUSCULAR; INTRAVENOUS; SUBCUTANEOUS
Status: DISCONTINUED | OUTPATIENT
Start: 2018-01-11 | End: 2018-01-12 | Stop reason: HOSPADM

## 2018-01-11 RX ORDER — LIDOCAINE HYDROCHLORIDE 20 MG/ML
INJECTION, SOLUTION INFILTRATION; PERINEURAL PRN
Status: DISCONTINUED | OUTPATIENT
Start: 2018-01-11 | End: 2018-01-11

## 2018-01-11 RX ORDER — FENTANYL CITRATE 50 UG/ML
INJECTION, SOLUTION INTRAMUSCULAR; INTRAVENOUS PRN
Status: DISCONTINUED | OUTPATIENT
Start: 2018-01-11 | End: 2018-01-11

## 2018-01-11 RX ORDER — PROPOFOL 10 MG/ML
INJECTION, EMULSION INTRAVENOUS PRN
Status: DISCONTINUED | OUTPATIENT
Start: 2018-01-11 | End: 2018-01-11

## 2018-01-11 RX ADMIN — LIDOCAINE HYDROCHLORIDE 5 MG: 20 INJECTION, SOLUTION INFILTRATION; PERINEURAL at 07:59

## 2018-01-11 RX ADMIN — FENTANYL CITRATE 25 MCG: 50 INJECTION, SOLUTION INTRAMUSCULAR; INTRAVENOUS at 08:01

## 2018-01-11 RX ADMIN — TETRACAINE HYDROCHLORIDE 2 DROP: 5 SOLUTION OPHTHALMIC at 07:50

## 2018-01-11 RX ADMIN — ONDANSETRON 4 MG: 2 INJECTION INTRAMUSCULAR; INTRAVENOUS at 08:15

## 2018-01-11 RX ADMIN — SODIUM CHLORIDE, SODIUM LACTATE, POTASSIUM CHLORIDE, CALCIUM CHLORIDE: 600; 310; 30; 20 INJECTION, SOLUTION INTRAVENOUS at 06:47

## 2018-01-11 RX ADMIN — FENTANYL CITRATE 25 MCG: 50 INJECTION, SOLUTION INTRAMUSCULAR; INTRAVENOUS at 08:03

## 2018-01-11 RX ADMIN — ACETAMINOPHEN 975 MG: 325 TABLET ORAL at 06:30

## 2018-01-11 RX ADMIN — KETAMINE HYDROCHLORIDE 10 MG: 10 INJECTION, SOLUTION INTRAMUSCULAR; INTRAVENOUS at 07:47

## 2018-01-11 RX ADMIN — PROPOFOL 30 MG: 10 INJECTION, EMULSION INTRAVENOUS at 07:57

## 2018-01-11 RX ADMIN — PROPOFOL 20 MG: 10 INJECTION, EMULSION INTRAVENOUS at 07:59

## 2018-01-11 RX ADMIN — LIDOCAINE HYDROCHLORIDE 5 MG: 20 INJECTION, SOLUTION INFILTRATION; PERINEURAL at 07:57

## 2018-01-11 NOTE — IP AVS SNAPSHOT
MRN:[id number]                      After Visit Summary   1/11/2018    Elisabeth Voss    MRN: [id number]           Thank you!     Thank you for choosing [facility] for your care. Our goal is always to provide you with excellent care. Hearing back from our patients is one way we can continue to improve our services. Please take a few minutes to complete the written survey that you may receive in the mail after you visit with us. Thank you!        Patient Information     Date Of Birth          1950        About your hospital stay     You were admitted on:  January 11, 2018 You last received care in the:  [facility]    You were discharged on:  January 11, 2018       Who to Call     For medical emergencies, please call 911.  For non-urgent questions about your medical care, please call your primary care provider or clinic, [phone]  For questions related to your surgery, please call your surgery clinic        Attending Provider     Provider Jame Ferguson MD Ophthalmology       Primary Care Provider Office Phone # Fax #    Yon Gallagher [phone]      Your next 10 appointments already scheduled     Jan 23, 2018  8:15 AM CST   [facility] Visit with Jame Painter MD   [facility]    [address] [place] [address] [phone]            Jan 30, 2018  9:00 AM CST   [facility] Visit with [facility] Castellon MD   [facility]    [address] [address] [phone]              Further instructions from your care team       Elisabeth Voss    POST-OPERATIVE ORDERS (BLEPHAROPLASTY)  1. Follow up visits are important. Make sure you have an appointment set up to see Dr. Painter 7 to 10 days after surgery.  2. Take pain medication as directed. Discomfort diminishes appreciably 24 hours  after  surgery.  3. Apply ointment 3 times a day, dabbing not rubbing.  4. Good nutrition and fluid intake is essential to wound healing. Eat well-balanced meals  and drink plenty of fluids.  5. Keep your head elevated with at least two pillows and sleep on your back for one  week.  6. Notify our office immediately if you notice any sudden changes in vision or a sudden  increase in swelling - particularly if only on one side. A small amount of red drainage  from you incisions is expected.  7. Use cool compresses for the first 24 hours to help reduce swelling. Do not apply ice  directly to your skin.  8. It is normal for your eyes to feel somewhat dry for several days. Ointment should be  applied to your eyes nightly for one week. Artificial tears may be used four times daily to  supplement normal tearing. Both products may be purchased at your pharmacy without a  prescription.  9. Do not wear contact lenses for two weeks.  10. You may shower 24 hours after surgery. Have the water comfortably warm - not hot.  11. Take it easy and avoid any activity that may raise your blood pressure for one week.  12. If necessary to  an object from below your waist, bend from your knees, not  your waist. Avoid strenuous exercise for three weeks.  13. Numbness of the skin, bruising and swelling are to be expected. You will continue to  see improvement for weeks to months.  14. Upper eyelid stitches dissolve. Make-up may be gently applied 7 days after surgery.  15. Do not drive if taking prescription pain medication.  16. Dark glasses may be helpful both for temporary light and wind sensitivity and to  camouflage bruising.  17. Remember, same day surgery does not mean same day recovery. Healing is a gradual  process. It is normal to feel impatient and discouraged while waiting for swelling,  bruising and discomfort to diminish. Please be patient. Extra rest, a nutritious diet and  avoidance of stress are important aids to  recovery.  18. Scars continue to change and they will improve for 12 to 18 months. A 30 sun block  is recommended for 6 months.  19. Please do not hesitate to call with questions or concerns. We are here to help you,  [phone].        [facility]  Same-Day Surgery   Adult Discharge Orders & Instructions   For 24 hours after surgery  1. Get plenty of rest.  A responsible adult must stay with you for at least 24 hours after you leave the hospital.   2. Do not drive or use heavy equipment.  If you have weakness or tingling, don't drive or use heavy equipment until this feeling goes away.  3. Do not drink alcohol.  4. Avoid strenuous or risky activities.  Ask for help when climbing stairs.   5. You may feel lightheaded.  IF so, sit for a few minutes before standing.  Have someone help you get up.   6. If you have nausea (feel sick to your stomach): Drink only clear liquids such as apple juice, ginger ale, broth or 7-Up.  Rest may also help.  Be sure to drink enough fluids.  Move to a regular diet as you feel able.  7. You may have a slight fever. Call the doctor if your fever is over 100 F (37.7 C) (taken under the tongue) or lasts longer than 24 hours.  8. You may have a dry mouth, a sore throat, muscle aches or trouble sleeping.  These should go away after 24 hours.  9. Do not make important or legal decisions.   Call your doctor for any of the followin.  Signs of infection (fever, growing tenderness at the surgery site, a large amount of drainage or bleeding, severe pain, foul-smelling drainage, redness, swelling).    2. It has been over 8 to 10 hours since surgery and you are still not able to urinate (pass water).    3.  Headache for over 24 hours.          Pending Results     No orders found from 2018 to 2018.            Admission Information     Date & Time Provider Department Dept. Phone    2018 Jame Painter MD [facility] [phone]  "     Your Vitals Were     Blood Pressure Temperature Respirations Height Weight Pulse Oximetry    107/74 98.4  F (36.9  C) (Temporal) 16 1.651 m (5' 5\") 85.7 kg (189 lb) 96%    BMI (Body Mass Index)                   31.45 kg/m2           MyChart Information     [employer] gives you secure access to your electronic health record. If you see a primary care provider, you can also send messages to your care team and make appointments. If you have questions, please call your primary care clinic.  If you do not have a primary care provider, please call [phone] and they will assist you.        Care EveryWhere ID     This is your Care EveryWhere ID. This could be used by other organizations to access your [place] medical records  [id number]        Equal Access to Services     DEE DEE RAMIREZ : Yonathan Gudino, mart santoyo, francesca lopez, terri rasmussen. So [facility] [phone].    ATENCIÓN: Si habla español, tiene a ochoa disposición servicios gratuitos de asistencia lingüística. Llame al [phone].    We comply with applicable federal civil rights laws and [place] laws. We do not discriminate on the basis of race, color, national origin, age, disability, sex, sexual orientation, or gender identity.               Review of your medicines      UNREVIEWED medicines. Ask your doctor about these medicines        Dose / Directions    levothyroxine 50 MCG tablet   Commonly known as:  SYNTHROID/LEVOTHROID   Used for:  Other specified hypothyroidism        Dose:  50 mcg   Take 1 tablet (50 mcg) by mouth daily   Quantity:  90 tablet   Refills:  1       NAPROXEN PO        Dose:  660 mg   Take 660 mg by mouth 2 times daily as needed for moderate pain   Refills:  0       simvastatin 20 MG tablet   Commonly known as:  ZOCOR   Used for:  Hyperlipidemia LDL goal <160        TAKE ONE TABLET BY MOUTH AT BEDTIME   Quantity:  90 tablet   Refills:  1                Protect others " around you: Learn how to safely use, store and throw away your medicines at www.disposemymeds.org.             Medication List: This is a list of all your medications and when to take them. Check marks below indicate your daily home schedule. Keep this list as a reference.      Medications           Morning Afternoon Evening Bedtime As Needed    levothyroxine 50 MCG tablet   Commonly known as:  SYNTHROID/LEVOTHROID   Take 1 tablet (50 mcg) by mouth daily                                NAPROXEN PO   Take 660 mg by mouth 2 times daily as needed for moderate pain                                simvastatin 20 MG tablet   Commonly known as:  ZOCOR   TAKE ONE TABLET BY MOUTH AT BEDTIME

## 2018-01-11 NOTE — OP NOTE
PROCEDURE DATE: January 11, 2018    PREOPERATIVE DIAGNOSIS: Bilateral upper eyelid dermatochalasis.     POSTOPERATIVE DIAGNOSIS: Bilateral upper eyelid dermatochalasis.     PROCEDURE: Bilateral upper eyelid blepharoplasty.     SURGEON: Haile Painter MD     COMPLICATIONS: None.     ESTIMATED BLOOD LOSS: Less than 1 cc     INDICATIONS: Excessive eyelid skin requiring blepharoplasty surgery to improve visual fields.     DESCRIPTION OF PROCEDURE: The patient was brought to the operating suite where the upper face was prepped and draped in the usual sterile fashion. The eyelids were marked for the area of excision prior to any sedation. The patient was sedated with Propofol and the eyelids were injected with 1% Lidocaine with Epinephrine. A #15 blade was used to incise the skin along the previously delineated markings. A blunt-tipped scissors was then used to bluntly dissect along the tissue plane just anterior to the septum. The excessive eyelid skin and orbicularis muscle were removed together. The medial and central fat pads were reduced. Hemostasis was then achieved with hot cautery. The eyelid was reapproximated with a running 6-0 fast absorbing gut suture.     The identical procedure was carried out on the contralateral eye. TobraDex ointment was applied to both incisions and the patient was brought to the PACU in stable condition.     HAILE PAINTER MD

## 2018-01-11 NOTE — ANESTHESIA POSTPROCEDURE EVALUATION
Patient: Elisabeth Voss    Procedure(s):  Bilateral upper eyelid blepharoplasty - Wound Class: I-Clean    Diagnosis:droopy eyelids  Diagnosis Additional Information: No value filed.    Anesthesia Type:  MAC    Note:  Anesthesia Post Evaluation    Patient location during evaluation: Phase 2  Patient participation: Able to fully participate in evaluation  Level of consciousness: awake and alert  Pain management: adequate  Airway patency: patent  Cardiovascular status: acceptable  Respiratory status: acceptable  Hydration status: acceptable  PONV: none     Anesthetic complications: None          Last vitals:  Vitals:    01/11/18 0837 01/11/18 0845 01/11/18 0900   BP: 107/74 121/66 131/74   Resp: 16 16    Temp: 98.4  F (36.9  C)     SpO2: 96% 98% 98%         Electronically Signed By: Alexy Verdin DO  January 11, 2018  9:53 AM

## 2018-01-11 NOTE — DISCHARGE INSTRUCTIONS
Elisabeth Voss    POST-OPERATIVE ORDERS (BLEPHAROPLASTY)  1. Follow up visits are important. Make sure you have an appointment set up to see Dr. Painter 7 to 10 days after surgery.  2. Take pain medication as directed. Discomfort diminishes appreciably 24 hours after  surgery.  3. Apply ointment 3 times a day, dabbing not rubbing.  4. Good nutrition and fluid intake is essential to wound healing. Eat well-balanced meals  and drink plenty of fluids.  5. Keep your head elevated with at least two pillows and sleep on your back for one  week.  6. Notify our office immediately if you notice any sudden changes in vision or a sudden  increase in swelling - particularly if only on one side. A small amount of red drainage  from you incisions is expected.  7. Use cool compresses for the first 24 hours to help reduce swelling. Do not apply ice  directly to your skin.  8. It is normal for your eyes to feel somewhat dry for several days. Ointment should be  applied to your eyes nightly for one week. Artificial tears may be used four times daily to  supplement normal tearing. Both products may be purchased at your pharmacy without a  prescription.  9. Do not wear contact lenses for two weeks.  10. You may shower 24 hours after surgery. Have the water comfortably warm - not hot.  11. Take it easy and avoid any activity that may raise your blood pressure for one week.  12. If necessary to  an object from below your waist, bend from your knees, not  your waist. Avoid strenuous exercise for three weeks.  13. Numbness of the skin, bruising and swelling are to be expected. You will continue to  see improvement for weeks to months.  14. Upper eyelid stitches dissolve. Make-up may be gently applied 7 days after surgery.  15. Do not drive if taking prescription pain medication.  16. Dark glasses may be helpful both for temporary light and wind sensitivity and to  camouflage bruising.  17. Remember, same day surgery does not  mean same day recovery. Healing is a gradual  process. It is normal to feel impatient and discouraged while waiting for swelling,  bruising and discomfort to diminish. Please be patient. Extra rest, a nutritious diet and  avoidance of stress are important aids to recovery.  18. Scars continue to change and they will improve for 12 to 18 months. A 30 sun block  is recommended for 6 months.  19. Please do not hesitate to call with questions or concerns. We are here to help you,  [phone].        [facility]  Same-Day Surgery   Adult Discharge Orders & Instructions   For 24 hours after surgery  1. Get plenty of rest.  A responsible adult must stay with you for at least 24 hours after you leave the hospital.   2. Do not drive or use heavy equipment.  If you have weakness or tingling, don't drive or use heavy equipment until this feeling goes away.  3. Do not drink alcohol.  4. Avoid strenuous or risky activities.  Ask for help when climbing stairs.   5. You may feel lightheaded.  IF so, sit for a few minutes before standing.  Have someone help you get up.   6. If you have nausea (feel sick to your stomach): Drink only clear liquids such as apple juice, ginger ale, broth or 7-Up.  Rest may also help.  Be sure to drink enough fluids.  Move to a regular diet as you feel able.  7. You may have a slight fever. Call the doctor if your fever is over 100 F (37.7 C) (taken under the tongue) or lasts longer than 24 hours.  8. You may have a dry mouth, a sore throat, muscle aches or trouble sleeping.  These should go away after 24 hours.  9. Do not make important or legal decisions.   Call your doctor for any of the followin.  Signs of infection (fever, growing tenderness at the surgery site, a large amount of drainage or bleeding, severe pain, foul-smelling drainage, redness, swelling).    2. It has been over 8 to 10 hours since surgery and you are still not able to urinate (pass water).    3.   Headache for over 24 hours.

## 2018-01-11 NOTE — IP AVS SNAPSHOT
[facility]    [address] [place] [place] [address]    Phone:  [phone]                                       After Visit Summary   1/11/2018    Elisabeth Voss    MRN: [id number]           After Visit Summary Signature Page     I have received my discharge instructions, and my questions have been answered. I have discussed any challenges I see with this plan with the nurse or doctor.    ..........................................................................................................................................  Patient/Patient Representative Signature      ..........................................................................................................................................  Patient Representative Print Name and Relationship to Patient    ..................................................               ................................................  Date                                            Time    ..........................................................................................................................................  Reviewed by Signature/Title    ...................................................              ..............................................  Date                                                            Time

## 2018-01-11 NOTE — ANESTHESIA PREPROCEDURE EVALUATION
Anesthesia Evaluation     . Pt has had prior anesthetic.     No history of anesthetic complications          ROS/MED HX    ENT/Pulmonary: Comment: Cataracts      Neurologic:       Cardiovascular:     (+) ----. : . . . :. . Previous cardiac testing date:results:date: results:ECG reviewed date: results:NSR, left axis date: results:          METS/Exercise Tolerance:  4 - Raking leaves, gardening   Hematologic:         Musculoskeletal:         GI/Hepatic:         Renal/Genitourinary:         Endo:     (+) thyroid problem hypothyroidism, .      Psychiatric:         Infectious Disease:         Malignancy:   (+) Malignancy History of Skin  Skin CA Remission status post,         Other:                     Physical Exam  Normal systems: cardiovascular, pulmonary and dental    Airway   Mallampati: I  TM distance: >3 FB  Neck ROM: full    Dental     Cardiovascular       Pulmonary                     Anesthesia Plan      History & Physical Review  History and physical reviewed and following examination; no interval change.    ASA Status:  2 .    NPO Status:  > 8 hours    Plan for MAC Reason for MAC:  Deep or markedly invasive procedure (G8)  PONV prophylaxis:  Ondansetron (or other 5HT-3)       Postoperative Care  Postoperative pain management:  Multi-modal analgesia.      Consents  Anesthetic plan, risks, benefits and alternatives discussed with:  Patient.  Use of blood products discussed: No .   .                          .

## 2018-01-11 NOTE — ANESTHESIA CARE TRANSFER NOTE
Patient: Elisabeth Voss    Procedure(s):  Bilateral upper eyelid blepharoplasty - Wound Class: I-Clean    Diagnosis: droopy eyelids  Diagnosis Additional Information: No value filed.    Anesthesia Type:   MAC     Note:  Airway :Room Air  Patient transferred to:Phase II  Handoff Report: Identifed the Patient, Identified the Reponsible Provider, Reviewed the pertinent medical history, Discussed the surgical course, Reviewed Intra-OP anesthesia mangement and issues during anesthesia, Set expectations for post-procedure period and Allowed opportunity for questions and acknowledgement of understanding      Vitals: (Last set prior to Anesthesia Care Transfer)    CRNA VITALS  1/11/2018 0808 - 1/11/2018 0843      1/11/2018             Pulse: 72    SpO2: 100 %                Electronically Signed By: CORAL Candelario CRNA  January 11, 2018  8:43 AM

## 2018-01-18 ENCOUNTER — TELEPHONE (OUTPATIENT)
Dept: OPHTHALMOLOGY | Facility: CLINIC | Age: 68
End: 2018-01-18

## 2018-01-18 NOTE — TELEPHONE ENCOUNTER
[facility] Call Center    Phone Message    Name of Caller: Elisabeth    Phone Number: Home number on file [phone] (home)    Best time to return call: Any    May a detailed message be left on voicemail: yes    Relation to patient: Self    Reason for Call: Patient is experiencing fluid retention under her eyes. Would like a call back to discuss. If no answer please leave a detailed message if applicable per patient request. Thank you    Action Taken: Message routed to:  Adult Clinics: Eye p [id number]

## 2018-01-18 NOTE — TELEPHONE ENCOUNTER
Returned phone call to pt, discussed undereye swelling after blepharoplasty, encouraged pt to continue using warm packs as needed until swelling resolves.  Pt to call back if further questions or concerns arise.  Patti Robledo RN

## 2018-01-23 ENCOUNTER — OFFICE VISIT (OUTPATIENT)
Dept: OPHTHALMOLOGY | Facility: CLINIC | Age: 68
End: 2018-01-23
Payer: COMMERCIAL

## 2018-01-23 DIAGNOSIS — Z98.890 S/P BILATERAL BLEPHAROPLASTY: Primary | ICD-10-CM

## 2018-01-23 PROCEDURE — 99024 POSTOP FOLLOW-UP VISIT: CPT | Performed by: OPHTHALMOLOGY

## 2018-01-23 ASSESSMENT — VISUAL ACUITY
METHOD: SNELLEN - LINEAR
OD_CC+: +3
OD_CC: 20/20
CORRECTION_TYPE: GLASSES

## 2018-01-23 ASSESSMENT — PACHYMETRY
OS_CT(UM): 555
OD_CT(UM): 561

## 2018-01-23 NOTE — MR AVS SNAPSHOT
After Visit Summary   1/23/2018    Elisabeth Voss    MRN: [id number]           Patient Information     Date Of Birth          1950        Visit Information        Provider Department      1/23/2018 8:15 AM Jame Painter MD [facility]        Today's Diagnoses     S/p bilateral blepharoplasty    -  1       Follow-ups after your visit        Your next 10 appointments already scheduled     Jan 30, 2018  9:00 AM CST   Return Visit with Dorothy Castellon MD   [facility]    [address] [place] [address] [phone]              Who to contact     If you have questions or need follow up information about today's clinic visit or your schedule please contact [facility] directly at [phone].  Normal or non-critical lab and imaging results will be communicated to you by [employer], letter or phone within 4 business days after the clinic has received the results. If you do not hear from us within 7 days, please contact the clinic through [employer] or phone. If you have a critical or abnormal lab result, we will notify you by phone as soon as possible.  Submit refill requests through [employer] or call your pharmacy and they will forward the refill request to us. Please allow 3 business days for your refill to be completed.          Additional Information About Your Visit        MyChart Information     [employer] gives you secure access to your electronic health record. If you see a primary care provider, you can also send messages to your care team and make appointments. If you have questions, please call your primary care clinic.  If you do not have a primary care provider, please call [phone] and they will assist you.      [employer] is an electronic gateway that provides easy, online access to your medical records. With [employer], you can request a clinic appointment, read your test results, renew a  prescription or communicate with your care team.     To access your existing account, please contact your [facility] or call [phone] for assistance.        Care EveryWhere ID     This is your Care EveryWhere ID. This could be used by other organizations to access your [place] medical records  [id number]         Blood Pressure from Last 3 Encounters:   01/11/18 131/74   01/05/18 106/66   10/01/17 130/76    Weight from Last 3 Encounters:   01/04/18 85.7 kg (189 lb)   01/05/18 85.7 kg (189 lb)   11/13/17 88 kg (194 lb)              We Performed the Following     POST-OP FOLLOW-UP VISIT        Primary Care Provider Office Phone # Fax #    Yon Gallagher [phone]       [facility] [address] [place] [address]        Equal Access to Services     [facility] CenterANDREW : Hadii aad ku hadasho Soomaali, waaxda luqadaha, qaybta kaalmada adeegyada, waxay idiin hayaan mercy kharaatiya quiñonez . So [facility] [phone].    ATENCIÓN: Si habla español, tiene a ochoa disposición servicios gratuitos de asistencia lingüística. Llame al [phone].    We comply with applicable federal civil rights laws and [place] laws. We do not discriminate on the basis of race, color, national origin, age, disability, sex, sexual orientation, or gender identity.            Thank you!     Thank you for choosing [facility]  for your care. Our goal is always to provide you with excellent care. Hearing back from our patients is one way we can continue to improve our services. Please take a few minutes to complete the written survey that you may receive in the mail after your visit with us. Thank you!             Your Updated Medication List - Protect others around you: Learn how to safely use, store and throw away your medicines at www.disposemymeds.org.          This list is accurate as of: 1/23/18  8:34 AM.  Always use your most recent med list.                    Brand Name Dispense Instructions for use Diagnosis    levothyroxine 50 MCG tablet    SYNTHROID/LEVOTHROID    90 tablet    Take 1 tablet (50 mcg) by mouth daily    Other specified hypothyroidism       NAPROXEN PO      Take 660 mg by mouth 2 times daily as needed for moderate pain        simvastatin 20 MG tablet    ZOCOR    90 tablet    TAKE ONE TABLET BY MOUTH AT BEDTIME    Hyperlipidemia LDL goal <160

## 2018-01-23 NOTE — PROGRESS NOTES
Assessment & Plan   Elisabeth Voss is a 67 year old female who presents with:   Review of systems for the eyes was negative other than the pertinent positives and negatives noted in the HPI.    S/p bilateral blepharoplasty  - Looks great      Return in 12 months for annual.    Documentation for today's encounter was performed by Linda BALLARD. Acting as a scribe in my presence. I have reviewed and verified that it is an accurate recording of today's encounter.    Attending Physician Attestation:  Complete documentation of historical and exam elements from today's encounter can be found in the full encounter summary report (not reduplicated in this progress note).  I personally obtained the chief complaint(s) and history of present illness.  I confirmed and edited as necessary the review of systems, past medical/surgical history, family history, social history, and examination findings as documented by others; and I examined the patient myself.  I personally reviewed the relevant tests, images, and reports as documented above.  I formulated and edited as necessary the assessment and plan and discussed the findings and management plan with the patient and family. - Jame Painter MD

## 2018-01-30 ENCOUNTER — ONCOLOGY VISIT (OUTPATIENT)
Dept: ONCOLOGY | Facility: CLINIC | Age: 68
End: 2018-01-30
Payer: COMMERCIAL

## 2018-01-30 VITALS
BODY MASS INDEX: 32.19 KG/M2 | TEMPERATURE: 97.1 F | DIASTOLIC BLOOD PRESSURE: 78 MMHG | SYSTOLIC BLOOD PRESSURE: 120 MMHG | HEART RATE: 69 BPM | WEIGHT: 193.2 LBS | RESPIRATION RATE: 16 BRPM | HEIGHT: 65 IN | OXYGEN SATURATION: 98 %

## 2018-01-30 DIAGNOSIS — E78.5 HYPERLIPIDEMIA LDL GOAL <160: Primary | ICD-10-CM

## 2018-01-30 DIAGNOSIS — C43.30 MALIGNANT MELANOMA OF SKIN OF FACE (H): ICD-10-CM

## 2018-01-30 LAB
ALBUMIN SERPL-MCNC: 4.2 G/DL (ref 3.4–5)
ALP SERPL-CCNC: 64 U/L (ref 40–150)
ALT SERPL W P-5'-P-CCNC: 32 U/L (ref 0–50)
ANION GAP SERPL CALCULATED.3IONS-SCNC: 6 MMOL/L (ref 3–14)
AST SERPL W P-5'-P-CCNC: 24 U/L (ref 0–45)
BASOPHILS # BLD AUTO: 0 10E9/L (ref 0–0.2)
BASOPHILS NFR BLD AUTO: 0.4 %
BILIRUB SERPL-MCNC: 0.5 MG/DL (ref 0.2–1.3)
BUN SERPL-MCNC: 17 MG/DL (ref 7–30)
CALCIUM SERPL-MCNC: 9.4 MG/DL (ref 8.5–10.1)
CHLORIDE SERPL-SCNC: 107 MMOL/L (ref 94–109)
CO2 SERPL-SCNC: 28 MMOL/L (ref 20–32)
CREAT SERPL-MCNC: 1.03 MG/DL (ref 0.52–1.04)
DIFFERENTIAL METHOD BLD: NORMAL
EOSINOPHIL # BLD AUTO: 0.1 10E9/L (ref 0–0.7)
EOSINOPHIL NFR BLD AUTO: 1.7 %
ERYTHROCYTE [DISTWIDTH] IN BLOOD BY AUTOMATED COUNT: 14.9 % (ref 10–15)
GFR SERPL CREATININE-BSD FRML MDRD: 53 ML/MIN/1.7M2
GLUCOSE SERPL-MCNC: 97 MG/DL (ref 70–99)
HCT VFR BLD AUTO: 44.9 % (ref 35–47)
HGB BLD-MCNC: 14.7 G/DL (ref 11.7–15.7)
IMM GRANULOCYTES # BLD: 0 10E9/L (ref 0–0.4)
IMM GRANULOCYTES NFR BLD: 0.2 %
LDH SERPL L TO P-CCNC: 232 U/L (ref 81–234)
LYMPHOCYTES # BLD AUTO: 1.4 10E9/L (ref 0.8–5.3)
LYMPHOCYTES NFR BLD AUTO: 30 %
MCH RBC QN AUTO: 29.1 PG (ref 26.5–33)
MCHC RBC AUTO-ENTMCNC: 32.7 G/DL (ref 31.5–36.5)
MCV RBC AUTO: 89 FL (ref 78–100)
MONOCYTES # BLD AUTO: 0.4 10E9/L (ref 0–1.3)
MONOCYTES NFR BLD AUTO: 8.9 %
NEUTROPHILS # BLD AUTO: 2.8 10E9/L (ref 1.6–8.3)
NEUTROPHILS NFR BLD AUTO: 58.8 %
PLATELET # BLD AUTO: 234 10E9/L (ref 150–450)
POTASSIUM SERPL-SCNC: 4.6 MMOL/L (ref 3.4–5.3)
PROT SERPL-MCNC: 7.5 G/DL (ref 6.8–8.8)
RBC # BLD AUTO: 5.06 10E12/L (ref 3.8–5.2)
SODIUM SERPL-SCNC: 141 MMOL/L (ref 133–144)
WBC # BLD AUTO: 4.7 10E9/L (ref 4–11)

## 2018-01-30 PROCEDURE — 85025 COMPLETE CBC W/AUTO DIFF WBC: CPT | Performed by: INTERNAL MEDICINE

## 2018-01-30 PROCEDURE — 36415 COLL VENOUS BLD VENIPUNCTURE: CPT | Performed by: INTERNAL MEDICINE

## 2018-01-30 PROCEDURE — 80053 COMPREHEN METABOLIC PANEL: CPT | Performed by: INTERNAL MEDICINE

## 2018-01-30 PROCEDURE — 99214 OFFICE O/P EST MOD 30 MIN: CPT | Performed by: INTERNAL MEDICINE

## 2018-01-30 PROCEDURE — 83615 LACTATE (LD) (LDH) ENZYME: CPT | Performed by: INTERNAL MEDICINE

## 2018-01-30 ASSESSMENT — PAIN SCALES - GENERAL: PAINLEVEL: NO PAIN (0)

## 2018-01-30 NOTE — PROGRESS NOTES
Hematology/ Oncology Follow-up Visit:  Jan 30, 2018    Reason for Visit:   Chief Complaint   Patient presents with     Oncology Clinic Visit     6 month follow up for Malignant melanoma of skin of face        Oncologic History:  Malignant melanoma of skin of face (H)  Elisabeth Voss is known malignant melanoma. she initially presented to her primary care provider on 06/26/2014. During that visit, she complained of a left lower jaw hyperpigmented mole that seems to be changing in color and size over the last 6 months. The mole was smaller in size but appeared to go slowly in time and became more hyperpigmented. There is no associated pain or other enlarged local lymphadenopathy. As a result of the patient's concern, she had excision of this lesion on the left lower chin on 06/20/2014, and the pathology revealed invasive nodular malignant melanoma, and the tumor size was 4 mm, and its thickness was 1.1 mm, and Robe level III. There was no ulceration. The margins were negative, but there was in situ melanoma about 0.46 mm from the peripheral margin and then invasive melanoma was present 0.66 mm from the nearest peripheral margin. The deep margin was about 2.5 mm from the tumor. There was no lymphovascular invasion, and the pathologic staging was T1a. She was subsequently seen by Dr. Eliza Foss of surgical oncology in consultation on 07/10/2014, and she is expected to have a followup PET scan. Her staging PET scan done on 07/11/2014 showed hypermetabolic left thyroid nodule measuring 1.5 x 1.2 cm, with SUV of 14.5. There was another peripherally calcified 1.1 cm right thyroid nodule that demonstrated no metabolic activity. No other lymph nodes or other hypermetabolic lesion. The surgical area appears to be well. There was no convincing evidence of malignancy anywhere else. CT of the maxillofacial area also showed no evidence of any subcutaneous mass or adenopathy in the surrounding area. The patient continues  "to feel well and is symptomatic. She is being followed by dermatology and oncology.      Interval History:  Patient is here today for follow-up.  She has been doing well without any recent complaints.  Denies any bone aches or pains.  Denies any shortness of breath or cough or wheezing.  Denies any nausea or vomiting or diarrhea.  She is scheduled to see dermatology next week for surveillance.     Review Of Systems:  Constitutional: Negative for fever, chills, and night sweats.  Skin: negative.  Eyes: negative.  Ears/Nose/Throat: negative.  Respiratory: No shortness of breath, dyspnea on exertion, cough, or hemoptysis.  Cardiovascular: negative.  Gastrointestinal: negative.  Genitourinary: negative.  Musculoskeletal: negative.  Neurologic: negative.  Psychiatric: negative.  Hematologic/Lymphatic/Immunologic: negative.  Endocrine: negative.    All other ROS negative unless mentioned in interval history.    Past medical, social, surgical, and family histories reviewed.    Allergies:  Allergies as of 01/30/2018 - [facility] as Reviewed 01/30/2018   Allergen Reaction Noted     No known drug allergies  04/10/2001       Current Medications:  Current Outpatient Prescriptions   Medication Sig Dispense Refill     levothyroxine (SYNTHROID/LEVOTHROID) 50 MCG tablet Take 1 tablet (50 mcg) by mouth daily 90 tablet 1     simvastatin (ZOCOR) 20 MG tablet TAKE ONE TABLET BY MOUTH AT BEDTIME 90 tablet 1     NAPROXEN PO Take 660 mg by mouth 2 times daily as needed for moderate pain          Physical Exam:  /78 (BP Location: Right arm, Patient Position: Chair, Cuff Size: Adult Regular)  Pulse 69  Temp 97.1  F (36.2  C) (Temporal)  Resp 16  Ht 1.651 m (5' 5\")  Wt 87.6 kg (193 lb 3.2 oz)  SpO2 98%  BMI 32.15 kg/m2  Wt Readings from Last 12 Encounters:   01/30/18 87.6 kg (193 lb 3.2 oz)   01/04/18 85.7 kg (189 lb)   01/05/18 85.7 kg (189 lb)   11/13/17 88 kg (194 lb)   10/23/17 88 kg (194 lb)   10/04/17 88 kg (194 lb)   10/01/17 " 88 kg (194 lb)   07/27/17 85.1 kg (187 lb 9.6 oz)   02/27/17 85.9 kg (189 lb 6 oz)   02/08/17 86.1 kg (189 lb 14.4 oz)   12/16/16 84.8 kg (187 lb)   12/01/16 83.9 kg (185 lb)     ECOG performance status: 0  GENERAL APPEARANCE: Healthy, alert and in no acute distress.  HEENT: Sclerae anicteric. PERRLA. Oropharynx without ulcers, lesions, or thrush.  NECK: Supple. No asymmetry or masses.  LYMPHATICS: No palpable cervical, supraclavicular, axillary, or inguinal lymphadenopathy.  RESP: Lungs clear to auscultation bilaterally without rales, rhonchi or wheezes.  CARDIOVASCULAR: Regular rate and rhythm. Normal S1, S2; no S3 or S4. No murmur, gallop, or rub.  ABDOMEN: Soft, nontender. Bowel sounds normal. No palpable organomegaly or masses.  MUSCULOSKELETAL: Extremities without gross deformities noted. No edema of bilateral lower extremities.  SKIN: No suspicious lesions or rashes.  NEURO: Alert and oriented x 3. Cranial nerves II-XII grossly intact.  PSYCHIATRIC: Mentation and affect appear normal.    Laboratory/Imaging Studies:  No visits with results within 2 Week(s) from this visit.  Latest known visit with results is:    Orders Only on 07/21/2017   Component Date Value Ref Range Status     WBC 07/21/2017 4.9  4.0 - 11.0 10e9/L Final     RBC Count 07/21/2017 4.88  3.8 - 5.2 10e12/L Final     Hemoglobin 07/21/2017 14.1  11.7 - 15.7 g/dL Final     Hematocrit 07/21/2017 43.3  35.0 - 47.0 % Final     MCV 07/21/2017 89  78 - 100 fl Final     MCH 07/21/2017 28.9  26.5 - 33.0 pg Final     MCHC 07/21/2017 32.6  31.5 - 36.5 g/dL Final     RDW 07/21/2017 14.1  10.0 - 15.0 % Final     Platelet Count 07/21/2017 229  150 - 450 10e9/L Final     Diff Method 07/21/2017 Automated Method   Final     % Neutrophils 07/21/2017 53.1  % Final     % Lymphocytes 07/21/2017 34.4  % Final     % Monocytes 07/21/2017 8.4  % Final     % Eosinophils 07/21/2017 3.5  % Final     % Basophils 07/21/2017 0.4  % Final     % Immature Granulocytes 07/21/2017  0.2  % Final     Absolute Neutrophil 07/21/2017 2.6  1.6 - 8.3 10e9/L Final     Absolute Lymphocytes 07/21/2017 1.7  0.8 - 5.3 10e9/L Final     Absolute Monocytes 07/21/2017 0.4  0.0 - 1.3 10e9/L Final     Absolute Eosinophils 07/21/2017 0.2  0.0 - 0.7 10e9/L Final     Absolute Basophils 07/21/2017 0.0  0.0 - 0.2 10e9/L Final     Abs Immature Granulocytes 07/21/2017 0.0  0 - 0.4 10e9/L Final     Sodium 07/21/2017 143  133 - 144 mmol/L Final     Potassium 07/21/2017 4.3  3.4 - 5.3 mmol/L Final     Chloride 07/21/2017 107  94 - 109 mmol/L Final     Carbon Dioxide 07/21/2017 29  20 - 32 mmol/L Final     Anion Gap 07/21/2017 7  3 - 14 mmol/L Final     Glucose 07/21/2017 91  70 - 99 mg/dL Final     Urea Nitrogen 07/21/2017 10  7 - 30 mg/dL Final     Creatinine 07/21/2017 1.02  0.52 - 1.04 mg/dL Final     GFR Estimate 07/21/2017 54* >60 mL/min/1.7m2 Final    Non  GFR Calc     GFR Estimate If Black 07/21/2017 65  >60 mL/min/1.7m2 Final    African American GFR Calc     Calcium 07/21/2017 9.1  8.5 - 10.1 mg/dL Final     Bilirubin Total 07/21/2017 0.5  0.2 - 1.3 mg/dL Final     Albumin 07/21/2017 4.0  3.4 - 5.0 g/dL Final     Protein Total 07/21/2017 6.9  6.8 - 8.8 g/dL Final     Alkaline Phosphatase 07/21/2017 63  40 - 150 U/L Final     ALT 07/21/2017 29  0 - 50 U/L Final     AST 07/21/2017 21  0 - 45 U/L Final          Assessment and plan:    (C43.30) Malignant melanoma of skin of face (H)  I reviewed with patient most recent laboratory tests.  There is no clinical evidence of disease recurrence.  I will see the patient again in 6 months or sooner if there are new developments or concerns.    Hypothyroid.  The patient currently on levothyroxine 50 mcg daily.    (E78.5) Hyperlipidemia LDL goal <160   patient currently on Zocor 20 mg orally daily.    The patient is ready to learn, no apparent learning barriers were identified.  Diagnosis and treatment plans were explained to the patient. The patient expressed  understanding of the content. The patient asked appropriate questions. The patient questions were answered to her satisfaction.    Chart documentation with Dragon Voice recognition Software. Although reviewed after completion, some words and grammatical errors may remain.

## 2018-01-30 NOTE — LETTER
1/30/2018         RE: Elisabeth Voss  [address]  [facility] [address]        Dear Colleague,    Thank you for referring your patient, Elisabeth Voss, to the [facility]. Please see a copy of my visit note below.    Hematology/ Oncology Follow-up Visit:  Jan 30, 2018    Reason for Visit:   Chief Complaint   Patient presents with     Oncology Clinic Visit     6 month follow up for Malignant melanoma of skin of face        Oncologic History:  Malignant melanoma of skin of face (H)  Elisabeth Voss is known malignant melanoma. she initially presented to her primary care provider on 06/26/2014. During that visit, she complained of a left lower jaw hyperpigmented mole that seems to be changing in color and size over the last 6 months. The mole was smaller in size but appeared to go slowly in time and became more hyperpigmented. There is no associated pain or other enlarged local lymphadenopathy. As a result of the patient's concern, she had excision of this lesion on the left lower chin on 06/20/2014, and the pathology revealed invasive nodular malignant melanoma, and the tumor size was 4 mm, and its thickness was 1.1 mm, and Robe level III. There was no ulceration. The margins were negative, but there was in situ melanoma about 0.46 mm from the peripheral margin and then invasive melanoma was present 0.66 mm from the nearest peripheral margin. The deep margin was about 2.5 mm from the tumor. There was no lymphovascular invasion, and the pathologic staging was T1a. She was subsequently seen by Dr. Eliza Foss of surgical oncology in consultation on 07/10/2014, and she is expected to have a followup PET scan. Her staging PET scan done on 07/11/2014 showed hypermetabolic left thyroid nodule measuring 1.5 x 1.2 cm, with SUV of 14.5. There was another peripherally calcified 1.1 cm right thyroid nodule that demonstrated no metabolic activity. No other lymph nodes or other  "hypermetabolic lesion. The surgical area appears to be well. There was no convincing evidence of malignancy anywhere else. CT of the maxillofacial area also showed no evidence of any subcutaneous mass or adenopathy in the surrounding area. The patient continues to feel well and is symptomatic. She is being followed by dermatology and oncology.      Interval History:  Patient is here today for follow-up.  She has been doing well without any recent complaints.  Denies any bone aches or pains.  Denies any shortness of breath or cough or wheezing.  Denies any nausea or vomiting or diarrhea.  She is scheduled to see dermatology next week for surveillance.     Review Of Systems:  Constitutional: Negative for fever, chills, and night sweats.  Skin: negative.  Eyes: negative.  Ears/Nose/Throat: negative.  Respiratory: No shortness of breath, dyspnea on exertion, cough, or hemoptysis.  Cardiovascular: negative.  Gastrointestinal: negative.  Genitourinary: negative.  Musculoskeletal: negative.  Neurologic: negative.  Psychiatric: negative.  Hematologic/Lymphatic/Immunologic: negative.  Endocrine: negative.    All other ROS negative unless mentioned in interval history.    Past medical, social, surgical, and family histories reviewed.    Allergies:  Allergies as of 01/30/2018 - [facility] as Reviewed 01/30/2018   Allergen Reaction Noted     No known drug allergies  04/10/2001       Current Medications:  Current Outpatient Prescriptions   Medication Sig Dispense Refill     levothyroxine (SYNTHROID/LEVOTHROID) 50 MCG tablet Take 1 tablet (50 mcg) by mouth daily 90 tablet 1     simvastatin (ZOCOR) 20 MG tablet TAKE ONE TABLET BY MOUTH AT BEDTIME 90 tablet 1     NAPROXEN PO Take 660 mg by mouth 2 times daily as needed for moderate pain          Physical Exam:  /78 (BP Location: Right arm, Patient Position: Chair, Cuff Size: Adult Regular)  Pulse 69  Temp 97.1  F (36.2  C) (Temporal)  Resp 16  Ht 1.651 m (5' 5\")  Wt 87.6 kg " (193 lb 3.2 oz)  SpO2 98%  BMI 32.15 kg/m2  Wt Readings from Last 12 Encounters:   01/30/18 87.6 kg (193 lb 3.2 oz)   01/04/18 85.7 kg (189 lb)   01/05/18 85.7 kg (189 lb)   11/13/17 88 kg (194 lb)   10/23/17 88 kg (194 lb)   10/04/17 88 kg (194 lb)   10/01/17 88 kg (194 lb)   07/27/17 85.1 kg (187 lb 9.6 oz)   02/27/17 85.9 kg (189 lb 6 oz)   02/08/17 86.1 kg (189 lb 14.4 oz)   12/16/16 84.8 kg (187 lb)   12/01/16 83.9 kg (185 lb)     ECOG performance status: 0  GENERAL APPEARANCE: Healthy, alert and in no acute distress.  HEENT: Sclerae anicteric. PERRLA. Oropharynx without ulcers, lesions, or thrush.  NECK: Supple. No asymmetry or masses.  LYMPHATICS: No palpable cervical, supraclavicular, axillary, or inguinal lymphadenopathy.  RESP: Lungs clear to auscultation bilaterally without rales, rhonchi or wheezes.  CARDIOVASCULAR: Regular rate and rhythm. Normal S1, S2; no S3 or S4. No murmur, gallop, or rub.  ABDOMEN: Soft, nontender. Bowel sounds normal. No palpable organomegaly or masses.  MUSCULOSKELETAL: Extremities without gross deformities noted. No edema of bilateral lower extremities.  SKIN: No suspicious lesions or rashes.  NEURO: Alert and oriented x 3. Cranial nerves II-XII grossly intact.  PSYCHIATRIC: Mentation and affect appear normal.    Laboratory/Imaging Studies:  No visits with results within 2 Week(s) from this visit.  Latest known visit with results is:    Orders Only on 07/21/2017   Component Date Value Ref Range Status     WBC 07/21/2017 4.9  4.0 - 11.0 10e9/L Final     RBC Count 07/21/2017 4.88  3.8 - 5.2 10e12/L Final     Hemoglobin 07/21/2017 14.1  11.7 - 15.7 g/dL Final     Hematocrit 07/21/2017 43.3  35.0 - 47.0 % Final     MCV 07/21/2017 89  78 - 100 fl Final     MCH 07/21/2017 28.9  26.5 - 33.0 pg Final     MCHC 07/21/2017 32.6  31.5 - 36.5 g/dL Final     RDW 07/21/2017 14.1  10.0 - 15.0 % Final     Platelet Count 07/21/2017 229  150 - 450 10e9/L Final     Diff Method 07/21/2017  Automated Method   Final     % Neutrophils 07/21/2017 53.1  % Final     % Lymphocytes 07/21/2017 34.4  % Final     % Monocytes 07/21/2017 8.4  % Final     % Eosinophils 07/21/2017 3.5  % Final     % Basophils 07/21/2017 0.4  % Final     % Immature Granulocytes 07/21/2017 0.2  % Final     Absolute Neutrophil 07/21/2017 2.6  1.6 - 8.3 10e9/L Final     Absolute Lymphocytes 07/21/2017 1.7  0.8 - 5.3 10e9/L Final     Absolute Monocytes 07/21/2017 0.4  0.0 - 1.3 10e9/L Final     Absolute Eosinophils 07/21/2017 0.2  0.0 - 0.7 10e9/L Final     Absolute Basophils 07/21/2017 0.0  0.0 - 0.2 10e9/L Final     Abs Immature Granulocytes 07/21/2017 0.0  0 - 0.4 10e9/L Final     Sodium 07/21/2017 143  133 - 144 mmol/L Final     Potassium 07/21/2017 4.3  3.4 - 5.3 mmol/L Final     Chloride 07/21/2017 107  94 - 109 mmol/L Final     Carbon Dioxide 07/21/2017 29  20 - 32 mmol/L Final     Anion Gap 07/21/2017 7  3 - 14 mmol/L Final     Glucose 07/21/2017 91  70 - 99 mg/dL Final     Urea Nitrogen 07/21/2017 10  7 - 30 mg/dL Final     Creatinine 07/21/2017 1.02  0.52 - 1.04 mg/dL Final     GFR Estimate 07/21/2017 54* >60 mL/min/1.7m2 Final    Non  GFR Calc     GFR Estimate If Black 07/21/2017 65  >60 mL/min/1.7m2 Final    African American GFR Calc     Calcium 07/21/2017 9.1  8.5 - 10.1 mg/dL Final     Bilirubin Total 07/21/2017 0.5  0.2 - 1.3 mg/dL Final     Albumin 07/21/2017 4.0  3.4 - 5.0 g/dL Final     Protein Total 07/21/2017 6.9  6.8 - 8.8 g/dL Final     Alkaline Phosphatase 07/21/2017 63  40 - 150 U/L Final     ALT 07/21/2017 29  0 - 50 U/L Final     AST 07/21/2017 21  0 - 45 U/L Final          Assessment and plan:    (C43.30) Malignant melanoma of skin of face (H)  I reviewed with patient most recent laboratory tests.  There is no clinical evidence of disease recurrence.  I will see the patient again in 6 months or sooner if there are new developments or concerns.    Hypothyroid.  The patient currently on  levothyroxine 50 mcg daily.    (E78.5) Hyperlipidemia LDL goal <160   patient currently on Zocor 20 mg orally daily.    The patient is ready to learn, no apparent learning barriers were identified.  Diagnosis and treatment plans were explained to the patient. The patient expressed understanding of the content. The patient asked appropriate questions. The patient questions were answered to her satisfaction.    Chart documentation with Dragon Voice recognition Software. Although reviewed after completion, some words and grammatical errors may remain.    Again, thank you for allowing me to participate in the care of your patient.        Sincerely,        Dorothy Castellon MD

## 2018-01-30 NOTE — NURSING NOTE
DISCHARGE PLAN:  Next appointments: See patient instruction section  Departure Mode: Ambulatory  Accompanied by:   5 minutes for nursing discharge (face to face time)     Elisabeth Voss is here today for Oncology follow up.  Writing nurse seen patient after Medical Oncology appointment to address questions/concerns/coordinate care. Patient to have labs today.  Follow up in 6 months with labs. Patient ambulated by nurse to  to schedule follow up and/or lab appointments. See patient instructions and Oncologist's Progress note for further details. Questions and concerns addressed to patient's satisfaction. Patient verbalized and demonstrated understanding of plan.  Contact information provided and patient is encouraged to call with any that arise in the interim of care.    Aleksander Schuler, RN, BSN, OCN   Oncology Care Coordinator RN  [facility]  [phone]  1/30/2018, 9:15 AM

## 2018-01-30 NOTE — PATIENT INSTRUCTIONS
Please follow up with Dr. Castellon in 6 months.  Please schedule labs 1-5 days prior to follow up appointment.    Labs Today!    Lab Date/Time:    Follow Up Date/Time:     If you have any questions or concerns please feel free to call.    If you need to reschedule please call:  Clinic or Lab Appointment - [phone]  Infusion - [phone]  Imaging - [phone]    Aleksander Schuler, RN, BSN, OCN   Oncology Care Coordinator RN  [facility]  [phone]

## 2018-01-30 NOTE — ASSESSMENT & PLAN NOTE
Elisabeth Voss is known malignant melanoma. she initially presented to her primary care provider on 06/26/2014. During that visit, she complained of a left lower jaw hyperpigmented mole that seems to be changing in color and size over the last 6 months. The mole was smaller in size but appeared to go slowly in time and became more hyperpigmented. There is no associated pain or other enlarged local lymphadenopathy. As a result of the patient's concern, she had excision of this lesion on the left lower chin on 06/20/2014, and the pathology revealed invasive nodular malignant melanoma, and the tumor size was 4 mm, and its thickness was 1.1 mm, and Robe level III. There was no ulceration. The margins were negative, but there was in situ melanoma about 0.46 mm from the peripheral margin and then invasive melanoma was present 0.66 mm from the nearest peripheral margin. The deep margin was about 2.5 mm from the tumor. There was no lymphovascular invasion, and the pathologic staging was T1a. She was subsequently seen by Dr. Eliza Foss of surgical oncology in consultation on 07/10/2014, and she is expected to have a followup PET scan. Her staging PET scan done on 07/11/2014 showed hypermetabolic left thyroid nodule measuring 1.5 x 1.2 cm, with SUV of 14.5. There was another peripherally calcified 1.1 cm right thyroid nodule that demonstrated no metabolic activity. No other lymph nodes or other hypermetabolic lesion. The surgical area appears to be well. There was no convincing evidence of malignancy anywhere else. CT of the maxillofacial area also showed no evidence of any subcutaneous mass or adenopathy in the surrounding area. The patient continues to feel well and is symptomatic. She is being followed by dermatology and oncology.

## 2018-01-30 NOTE — MR AVS SNAPSHOT
After Visit Summary   1/30/2018    Elisabeth Voss    MRN: [id number]           Patient Information     Date Of Birth          1950        Visit Information        Provider Department      1/30/2018 9:00 AM Dorothy Castellon MD [facility]        Today's Diagnoses     Hyperlipidemia LDL goal <160    -  1    Malignant melanoma of skin of face (H)          Care Instructions      Please follow up with Dr. Castellon in 6 months.  Please schedule labs 1-5 days prior to follow up appointment.    Labs Today!    Lab Date/Time:    Follow Up Date/Time:     If you have any questions or concerns please feel free to call.    If you need to reschedule please call:  Clinic or Lab Appointment - [phone]  Infusion - [phone]  Imaging - [phone]    Aleksander Schuler RN, BSN, OCN   Oncology Care Coordinator RN  [facility]  [phone]              Follow-ups after your visit        Follow-up notes from your care team     Return in about 6 months (around 7/30/2018) for lab ordered today, Blood work before next appointment.      Future tests that were ordered for you today     Open Future Orders        Priority Expected Expires Ordered    Lactate Dehydrogenase Routine 7/30/2018 1/30/2019 1/30/2018    CBC with platelets differential Routine 7/30/2018 1/30/2019 1/30/2018    Comprehensive metabolic panel Routine 7/30/2018 1/30/2019 1/30/2018    CBC with platelets differential Routine 1/30/2018 1/30/2019 1/30/2018    Comprehensive metabolic panel Routine 1/30/2018 1/30/2019 1/30/2018    Lactate Dehydrogenase Routine 1/30/2018 1/30/2019 1/30/2018            Who to contact     If you have questions or need follow up information about today's clinic visit or your schedule please contact [facility] directly at [phone].  Normal or non-critical lab and imaging results will be communicated to you by MyChart, letter or phone within 4 business days after the clinic has  "received the results. If you do not hear from us within 7 days, please contact the clinic through [employer] or phone. If you have a critical or abnormal lab result, we will notify you by phone as soon as possible.  Submit refill requests through [employer] or call your pharmacy and they will forward the refill request to us. Please allow 3 business days for your refill to be completed.          Additional Information About Your Visit        [employer] Information     [employer] gives you secure access to your electronic health record. If you see a primary care provider, you can also send messages to your care team and make appointments. If you have questions, please call your primary care clinic.  If you do not have a primary care provider, please call [phone] and they will assist you.        Care EveryWhere ID     This is your Care EveryWhere ID. This could be used by other organizations to access your [facility] medical records  [id number]        Your Vitals Were     Pulse Temperature Respirations Height Pulse Oximetry BMI (Body Mass Index)    69 97.1  F (36.2  C) (Temporal) 16 1.651 m (5' 5\") 98% 32.15 kg/m2       Blood Pressure from Last 3 Encounters:   01/30/18 120/78   01/11/18 131/74   01/05/18 106/66    Weight from Last 3 Encounters:   01/30/18 87.6 kg (193 lb 3.2 oz)   01/04/18 85.7 kg (189 lb)   01/05/18 85.7 kg (189 lb)               Primary Care Provider Office Phone # Fax #    Yon Gallagher [phone]       [facility] [address] [place] [address]        Equal Access to Services     [facility]: Hadii aad ku hadasho Soomaali, waaxda luqadaha, qaybta kaalmada terri lopez. So [facility] [phone].    ATENCIÓN: Si habla español, tiene a ochoa disposición servicios gratuitos de asistencia lingüística. Llame al [phone].    We comply with applicable federal civil rights laws and [place] laws. We do not discriminate on the basis of " race, color, national origin, age, disability, sex, sexual orientation, or gender identity.            Thank you!     Thank you for choosing [facility]  for your care. Our goal is always to provide you with excellent care. Hearing back from our patients is one way we can continue to improve our services. Please take a few minutes to complete the written survey that you may receive in the mail after your visit with us. Thank you!             Your Updated Medication List - Protect others around you: Learn how to safely use, store and throw away your medicines at www.disposemymeds.org.          This list is accurate as of 1/30/18  9:12 AM.  Always use your most recent med list.                   Brand Name Dispense Instructions for use Diagnosis    levothyroxine 50 MCG tablet    SYNTHROID/LEVOTHROID    90 tablet    Take 1 tablet (50 mcg) by mouth daily    Other specified hypothyroidism       NAPROXEN PO      Take 660 mg by mouth 2 times daily as needed for moderate pain        simvastatin 20 MG tablet    ZOCOR    90 tablet    TAKE ONE TABLET BY MOUTH AT BEDTIME    Hyperlipidemia LDL goal <160

## 2018-01-30 NOTE — NURSING NOTE
"Oncology Rooming Note    January 30, 2018 8:51 AM   Elisabeth Voss is a 67 year old female who presents for:    Chief Complaint   Patient presents with     Oncology Clinic Visit     6 month follow up for Malignant melanoma of skin of face      Initial Vitals: /78 (BP Location: Right arm, Patient Position: Chair, Cuff Size: Adult Regular)  Pulse 69  Temp 97.1  F (36.2  C) (Temporal)  Resp 16  Ht 1.651 m (5' 5\")  Wt 87.6 kg (193 lb 3.2 oz)  SpO2 98%  BMI 32.15 kg/m2 Estimated body mass index is 32.15 kg/(m^2) as calculated from the following:    Height as of this encounter: 1.651 m (5' 5\").    Weight as of this encounter: 87.6 kg (193 lb 3.2 oz). Body surface area is 2 meters squared.  No Pain (0) Comment: Data Unavailable   No LMP recorded. Patient has had a hysterectomy.  Allergies reviewed: Yes  Medications reviewed: Yes    Medications: Medication refills not needed today.  Pharmacy name entered into [facility]:    [facility] [place], [place] - [address] [place] [address] PHARMACY [place], [place] - [address] [place] [address] DR  [facility] [address] [place] [address]    Clinical concerns: None. Dr. Castellon was notified.    Doreen Lyon MA              "

## 2018-02-06 ENCOUNTER — TRANSFERRED RECORDS (OUTPATIENT)
Dept: HEALTH INFORMATION MANAGEMENT | Facility: CLINIC | Age: 68
End: 2018-02-06

## 2018-02-26 DIAGNOSIS — E03.8 OTHER SPECIFIED HYPOTHYROIDISM: ICD-10-CM

## 2018-02-26 DIAGNOSIS — E78.5 HYPERLIPIDEMIA LDL GOAL <160: ICD-10-CM

## 2018-02-26 NOTE — TELEPHONE ENCOUNTER
"Requested Prescriptions   Pending Prescriptions Disp Refills     levothyroxine (SYNTHROID/LEVOTHROID) 50 MCG tablet [Pharmacy Med Name: LEVOTHYROXIN 50MCG  TAB] 90 tablet 1     Sig: TAKE ONE TABLET BY MOUTH ONCE DAILY    Thyroid Protocol Failed    2/26/2018  8:50 AM       Failed - No positive pregnancy test in past 12 months    If patient is pregnant or has had a positive pregnancy test, please check TSH.         Passed - Patient is 12 years or older       Passed - Recent or future visit with authorizing provider's specialty    Patient had office visit in the last year or has a visit in the next 30 days with authorizing provider.  See \"Patient Info\" tab in inbasket, or \"Choose Columns\" in Meds & Orders section of the refill encounter.            Passed - Normal TSH on file in past 12 months    Recent Labs   Lab Test  02/27/17   0854   TSH  0.79             Passed - No active pregnancy on record    If patient is pregnant or has had a positive pregnancy test, please check TSH.          simvastatin (ZOCOR) 20 MG tablet [Pharmacy Med Name: SIMVASTATIN 20MG  TAB] 90 tablet 1     Sig: TAKE ONE TABLET BY MOUTH AT BEDTIME    Statins Protocol Failed    2/26/2018  8:50 AM       Failed - No positive pregnancy test in past 12 months       Passed - LDL on file in past 12 months    Recent Labs   Lab Test  04/28/17   0745   LDL  99            Passed - No abnormal creatine kinase in past 12 months    No lab results found.         Passed - Recent or future visit with authorizing provider    Patient had office visit in the last year or has a visit in the next 30 days with authorizing provider.  See \"Patient Info\" tab in inbasket, or \"Choose Columns\" in Meds & Orders section of the refill encounter.            Passed - Patient is age 18 or older       Passed - No active pregnancy on record          Last Written Prescription Date:  8/29/17  Last Fill Quantity: 90,  # refills: 1   Last Office Visit with [facility], [facility] or [facility] prescribing " provider:  1/5/18   Future Office Visit:    Next 5 appointments (look out 90 days)     Feb 28, 2018  7:00 AM CST   PHYSICAL with Yon Gallagher MD   [facility]    [address] [place] [id number] [phone]

## 2018-02-28 ENCOUNTER — OFFICE VISIT (OUTPATIENT)
Dept: INTERNAL MEDICINE | Facility: CLINIC | Age: 68
End: 2018-02-28
Payer: COMMERCIAL

## 2018-02-28 ENCOUNTER — HOSPITAL ENCOUNTER (OUTPATIENT)
Dept: MAMMOGRAPHY | Facility: CLINIC | Age: 68
Discharge: HOME | End: 2018-02-28
Attending: INTERNAL MEDICINE | Admitting: INTERNAL MEDICINE
Payer: MEDICARE

## 2018-02-28 VITALS
SYSTOLIC BLOOD PRESSURE: 114 MMHG | TEMPERATURE: 97.8 F | HEART RATE: 72 BPM | HEIGHT: 65 IN | OXYGEN SATURATION: 100 % | RESPIRATION RATE: 16 BRPM | DIASTOLIC BLOOD PRESSURE: 70 MMHG | BODY MASS INDEX: 31.67 KG/M2 | WEIGHT: 190.1 LBS

## 2018-02-28 DIAGNOSIS — E03.8 OTHER SPECIFIED HYPOTHYROIDISM: ICD-10-CM

## 2018-02-28 DIAGNOSIS — Z00.00 ENCOUNTER FOR ROUTINE ADULT HEALTH EXAMINATION WITHOUT ABNORMAL FINDINGS: Primary | ICD-10-CM

## 2018-02-28 DIAGNOSIS — Z12.31 VISIT FOR SCREENING MAMMOGRAM: ICD-10-CM

## 2018-02-28 DIAGNOSIS — E78.5 HYPERLIPIDEMIA LDL GOAL <160: ICD-10-CM

## 2018-02-28 LAB
CHOLEST SERPL-MCNC: 195 MG/DL
HDLC SERPL-MCNC: 73 MG/DL
LDLC SERPL CALC-MCNC: 103 MG/DL
NONHDLC SERPL-MCNC: 122 MG/DL
TRIGL SERPL-MCNC: 95 MG/DL
TSH SERPL DL<=0.005 MIU/L-ACNC: 1.85 MU/L (ref 0.4–4)

## 2018-02-28 PROCEDURE — G0439 PPPS, SUBSEQ VISIT: HCPCS | Performed by: INTERNAL MEDICINE

## 2018-02-28 PROCEDURE — 36415 COLL VENOUS BLD VENIPUNCTURE: CPT | Performed by: INTERNAL MEDICINE

## 2018-02-28 PROCEDURE — 80061 LIPID PANEL: CPT | Performed by: INTERNAL MEDICINE

## 2018-02-28 PROCEDURE — 77067 SCR MAMMO BI INCL CAD: CPT

## 2018-02-28 PROCEDURE — 84443 ASSAY THYROID STIM HORMONE: CPT | Performed by: INTERNAL MEDICINE

## 2018-02-28 RX ORDER — SIMVASTATIN 20 MG
TABLET ORAL
Qty: 90 TABLET | Refills: 3 | Status: SHIPPED | OUTPATIENT
Start: 2018-02-28 | End: 2019-03-01

## 2018-02-28 RX ORDER — LEVOTHYROXINE SODIUM 50 UG/1
50 TABLET ORAL DAILY
Qty: 90 TABLET | Refills: 3 | Status: SHIPPED | OUTPATIENT
Start: 2018-02-28 | End: 2019-03-01

## 2018-02-28 ASSESSMENT — ACTIVITIES OF DAILY LIVING (ADL)
I_NEED_ASSISTANCE_FOR_THE_FOLLOWING_DAILY_ACTIVITIES:: NO ASSISTANCE IS NEEDED
CURRENT_FUNCTION: NO ASSISTANCE NEEDED

## 2018-02-28 ASSESSMENT — PAIN SCALES - GENERAL: PAINLEVEL: NO PAIN (0)

## 2018-02-28 NOTE — NURSING NOTE
"Chief Complaint   Patient presents with     Physical       Initial /70 (BP Location: Left arm, Patient Position: Chair, Cuff Size: Adult Large)  Pulse 72  Temp 97.8  F (36.6  C) (Oral)  Resp 16  Ht 5' 5\" (1.651 m)  Wt 190 lb 1.6 oz (86.2 kg)  SpO2 100%  BMI 31.63 kg/m2 Estimated body mass index is 31.63 kg/(m^2) as calculated from the following:    Height as of this encounter: 5' 5\" (1.651 m).    Weight as of this encounter: 190 lb 1.6 oz (86.2 kg).  Medication Reconciliation: complete       Rosa HOLLIDAY LPN      "

## 2018-02-28 NOTE — PROGRESS NOTES
SUBJECTIVE:   Elisabeth Voss is a 67 year old female who presents for Preventive Visit.  She is doing well, needs refills.  No complaints.  Walking on the treadmill, mile a day.  Are you in the first 12 months of your Medicare coverage?  No    Physical   Annual:     Getting at least 3 servings of Calcium per day::  Yes    Bi-annual eye exam::  Yes    Dental care twice a year::  NO    Sleep apnea or symptoms of sleep apnea::  None    Diet::  Regular (no restrictions)    Frequency of exercise::  6-7 days/week    Duration of exercise::  30-45 minutes    Taking medications regularly::  Yes    Medication side effects::  Other    Additional concerns today::  No    Ability to successfully perform activities of daily living: no assistance needed  Home Safety:  Throw rugs in the hallway  Hearing Impairment: difficulty understanding speech on the telephone and no hearing concerns      Ability to successfully perform activities of daily living: Yes, no assistance needed  Home safety:  throw rugs in the hallway and lack of grab bars in the bathroom   Hearing impairment: Yes, telephone    Fall risk:  Fallen 2 or more times in the past year?: Yes  Any fall with injury in the past year?: No  Timed Up and Go Test (>13.5 is fall risk; contact physician) : 8    COGNITIVE SCREEN  1) Repeat 3 items (Banana, Sunrise, Chair)    2) Clock draw: NORMAL  3) 3 item recall: Recalls 2 objects   Results: NORMAL clock, 1-2 items recalled: COGNITIVE IMPAIRMENT LESS LIKELY    Mini-CogTM Copyright SHAILESH Sandhu. Licensed by the author for use in [facility]; reprinted with permission (jorge a@.[facility]). All rights reserved.        Reviewed and updated as needed this visit by clinical staff  Tobacco  Allergies  Med Hx  Surg Hx  Fam Hx  Soc Hx        Reviewed and updated as needed this visit by Provider        Social History   Substance Use Topics     Smoking status: Never Smoker     Smokeless tobacco: Never Used     Alcohol use 0.0  oz/week     0 Standard drinks or equivalent per week      Comment: occasional       No flowsheet data found.            Today's PHQ-2 Score:   PHQ-2 ( 1999 [employer]) 2/28/2018   Q1: Little interest or pleasure in doing things 0   Q2: Feeling down, depressed or hopeless 0   PHQ-2 Score 0   Q1: Little interest or pleasure in doing things Not at all   Q2: Feeling down, depressed or hopeless Not at all   PHQ-2 Score 0       Do you feel safe in your environment - Yes    Do you have a Health Care Directive?: No: Advance care planning was reviewed with patient; patient declined at this time.    Current providers sharing in care for this patient include:   Patient Care Team:  Yon Gallagher MD as PCP - General (Internal Medicine)  Aleksander Schuler RN as Registered Nurse (Hematology & Oncology)    The following health maintenance items are reviewed in Epic and correct as of today:  Health Maintenance   Topic Date Due     INFLUENZA VACCINE (SYSTEM ASSIGNED)  09/01/2017     FALL RISK ASSESSMENT  12/02/2017     LIPID MONITORING Q1 YEAR  04/28/2018     EYE EXAM Q1 YEAR  12/19/2018     MAMMO SCREEN Q2 YR (SYSTEM ASSIGNED)  02/14/2019     ADVANCE DIRECTIVE PLANNING Q5 YRS  08/11/2019     COLON CANCER SCREEN (SYSTEM ASSIGNED)  03/22/2023     TETANUS IMMUNIZATION (SYSTEM ASSIGNED)  02/23/2026     DEXA SCAN SCREENING (SYSTEM ASSIGNED)  Completed     PNEUMOCOCCAL  Completed     HEPATITIS C SCREENING  Completed         Pneumonia Vaccine:already done    Review of Systems  CONSTITUTIONAL: NEGATIVE for fever, chills, change in weight  INTEGUMENTARY/SKIN: NEGATIVE for worrisome rashes, moles or lesions  EYES: NEGATIVE for vision changes or irritation  ENT/MOUTH: NEGATIVE for ear, mouth and throat problems  RESP: NEGATIVE for significant cough or SOB  BREAST: NEGATIVE for masses, tenderness or discharge  CV: NEGATIVE for chest pain, palpitations or peripheral edema  GI: NEGATIVE for nausea, abdominal pain, heartburn, or change in bowel  "habits  : NEGATIVE for frequency, dysuria, or hematuria  MUSCULOSKELETAL: NEGATIVE for significant arthralgias or myalgia  NEURO: NEGATIVE for weakness, dizziness or paresthesias  ENDOCRINE: NEGATIVE for temperature intolerance, skin/hair changes  HEME: NEGATIVE for bleeding problems  PSYCHIATRIC: NEGATIVE for changes in mood or affect    OBJECTIVE:   /70 (BP Location: Left arm, Patient Position: Chair, Cuff Size: Adult Large)  Pulse 72  Temp 97.8  F (36.6  C) (Oral)  Resp 16  Ht 5' 5\" (1.651 m)  Wt 190 lb 1.6 oz (86.2 kg)  SpO2 100%  BMI 31.63 kg/m2 Estimated body mass index is 31.63 kg/(m^2) as calculated from the following:    Height as of this encounter: 5' 5\" (1.651 m).    Weight as of this encounter: 190 lb 1.6 oz (86.2 kg).  Physical Exam  GENERAL: healthy, alert and no distress  EYES: Eyes grossly normal to inspection, PERRL and conjunctivae and sclerae normal  HENT: ear canals and TM's normal, nose and mouth without ulcers or lesions  NECK: no adenopathy, no asymmetry, masses, or scars and thyroid normal to palpation  RESP: lungs clear to auscultation - no rales, rhonchi or wheezes  BREAST: patient deferred exam  CV: regular rate and rhythm, normal S1 S2, no S3 or S4, no murmur, click or rub, no peripheral edema and peripheral pulses strong  ABDOMEN: soft, nontender, no hepatosplenomegaly, no masses and bowel sounds normal  MS: no gross musculoskeletal defects noted, no edema  SKIN: no suspicious lesions or rashes  NEURO: Normal strength and tone, mentation intact and speech normal  PSYCH: mentation appears normal, affect normal/bright    ASSESSMENT / PLAN:       ICD-10-CM    1. Encounter for routine adult health examination without abnormal findings Z00.00    2. Other specified hypothyroidism E03.8 levothyroxine (SYNTHROID/LEVOTHROID) 50 MCG tablet     TSH   3. Hyperlipidemia LDL goal <160 E78.5 simvastatin (ZOCOR) 20 MG tablet     Lipid Profile     Overall she is doing well, she will do " "labs for lipids and thyroid, just had a pre op.  Eye lid surgery went well. Colonoscopy is good for 5 more years.    Mammogram today.     End of Life Planning:  Patient currently has an advanced directive: No.  I have verified the patient's ablity to prepare an advanced directive/make health care decisions.  Literature was provided to assist patient in preparing an advanced directive.    COUNSELING:  Reviewed preventive health counseling, as reflected in patient instructions       Regular exercise       Healthy diet/nutrition       Dental care        Estimated body mass index is 31.63 kg/(m^2) as calculated from the following:    Height as of this encounter: 5' 5\" (1.651 m).    Weight as of this encounter: 190 lb 1.6 oz (86.2 kg).  Weight management plan: Discussed healthy diet and exercise guidelines and patient will follow up in 12 months in clinic to re-evaluate.   reports that she has never smoked. She has never used smokeless tobacco.      Appropriate preventive services were discussed with this patient, including applicable screening as appropriate for cardiovascular disease, diabetes, osteopenia/osteoporosis, and glaucoma.  As appropriate for age/gender, discussed screening for colorectal cancer, prostate cancer, breast cancer, and cervical cancer. Checklist reviewing preventive services available has been given to the patient.    Reviewed patients plan of care and provided an AVS. The Basic Care Plan (routine screening as documented in Health Maintenance) for Elisabeth meets the Care Plan requirement. This Care Plan has been established and reviewed with the Patient.    Counseling Resources:  ATP IV Guidelines  Pooled Cohorts Equation Calculator  Breast Cancer Risk Calculator  FRAX Risk Assessment  ICSI Preventive Guidelines  Dietary Guidelines for Americans, 2010  USDA's MyPlate  ASA Prophylaxis  Lung CA Screening    Yon Gallagher MD  [facility]  Answers for HPI/ROS submitted by the patient " on 2/28/2018   PHQ-2 Score: 0

## 2018-02-28 NOTE — MR AVS SNAPSHOT
After Visit Summary   2/28/2018    Elisabeth Voss    MRN: [id number]           Patient Information     Date Of Birth          1950        Visit Information        Provider Department      2/28/2018 7:00 AM Yon Gallagher MD [facility]        Today's Diagnoses     Encounter for routine adult health examination without abnormal findings    -  1    Other specified hypothyroidism        Hyperlipidemia LDL goal <160          Care Instructions      Preventive Health Recommendations  Female Ages 65 +    Yearly exam:     See your health care provider every year in order to  o Review health changes.   o Discuss preventive care.    o Review your medicines if your doctor has prescribed any.      You no longer need a yearly Pap test unless you've had an abnormal Pap test in the past 10 years. If you have vaginal symptoms, such as bleeding or discharge, be sure to talk with your provider about a Pap test.      Every 1 to 2 years, have a mammogram.  If you are over 69, talk with your health care provider about whether or not you want to continue having screening mammograms.      Every 10 years, have a colonoscopy. Or, have a yearly FIT test (stool test). These exams will check for colon cancer.       Have a cholesterol test every 5 years, or more often if your doctor advises it.       Have a diabetes test (fasting glucose) every three years. If you are at risk for diabetes, you should have this test more often.       At age 65, have a bone density scan (DEXA) to check for osteoporosis (brittle bone disease).    Shots:    Get a flu shot each year.    Get a tetanus shot every 10 years.    Talk to your doctor about your pneumonia vaccines. There are now two you should receive - Pneumovax (PPSV 23) and Prevnar (PCV 13).    Talk to your doctor about the shingles vaccine.    Talk to your doctor about the hepatitis B vaccine.    Nutrition:     Eat at least 5 servings of fruits and vegetables each  "day.      Eat whole-grain bread, whole-wheat pasta and brown rice instead of white grains and rice.      Talk to your provider about Calcium and Vitamin D.     Lifestyle    Exercise at least 150 minutes a week (30 minutes a day, 5 days a week). This will help you control your weight and prevent disease.      Limit alcohol to one drink per day.      No smoking.       Wear sunscreen to prevent skin cancer.       See your dentist twice a year for an exam and cleaning.      See your eye doctor every 1 to 2 years to screen for conditions such as glaucoma, macular degeneration, cataracts, etc           Follow-ups after your visit        Your next 10 appointments already scheduled     Feb 28, 2018  8:30 AM CST   (Arrive by 8:15 AM)   MA SCREENING DIGITAL BILATERAL with PHMA1   [facility] ([facility])    [address] [place] [id number] [phone]           Do not use any powder, lotion or deodorant under your arms or on your breast. If you do, we will ask you to remove it before your exam.  Wear comfortable, two-piece clothing.  If you have any allergies, tell your care team.  Bring any previous mammograms from other facilities or have them mailed to the breast center. Three-dimensional (3D) mammograms are available at [place] locations in [facility], [facility], and [place]. [facility] locations include [place] and [facility] in [place]. Benefits of 3D mammograms include: - Improved rate of cancer detection - Decreases your chance of having to go back for more tests, which means fewer: - \"False-positive\" results (This means that there is an abnormal area but it isn't cancer.) - Invasive testing procedures, such as a biopsy or surgery - Can provide clearer images of the breast if you have dense breast tissue. 3D mammography is an optional exam that anyone can have with a 2D mammogram. It doesn't replace " or take the place of a 2D mammogram. 2D mammograms remain an effective screening test for all women.  Not all insurance companies cover the cost of a 3D mammogram. Check with your insurance.            Jul 24, 2018  8:00 AM CDT   [facility]    [address] [place] [id number] [phone]           Please do not eat 10-12 hours before your appointment if you are coming in fasting for labs on lipids, cholesterol, or glucose (sugar). This does not apply to pregnant women. Water, hot tea and black coffee (with nothing added) are okay. Do not drink other fluids, diet soda or chew gum.            Jul 31, 2018  8:00 AM CDT   Return Visit with Dorothy Castellon MD   [facility]    [address] [place] [id number] [phone]              Who to contact     If you have questions or need follow up information about today's clinic visit or your schedule please contact [facility] directly at [phone].  Normal or non-critical lab and imaging results will be communicated to you by Yoyocardhart, letter or phone within 4 business days after the clinic has received the results. If you do not hear from us within 7 days, please contact the clinic through Kingsoftt or phone. If you have a critical or abnormal lab result, we will notify you by phone as soon as possible.  Submit refill requests through [employer] Mobile or call your pharmacy and they will forward the refill request to us. Please allow 3 business days for your refill to be completed.          Additional Information About Your Visit        [employer] Information     [employer] gives you secure access to your electronic health record. If you see a primary care provider, you can also send messages to your care team and make appointments. If you have questions, please call your primary care clinic.  If you do not have a primary care  "provider, please call [phone] and they will assist you.        Care EveryWhere ID     This is your Care EveryWhere ID. This could be used by other organizations to access your [place] medical records  [id number]        Your Vitals Were     Pulse Temperature Respirations Height Pulse Oximetry BMI (Body Mass Index)    72 97.8  F (36.6  C) (Oral) 16 5' 5\" (1.651 m) 100% 31.63 kg/m2       Blood Pressure from Last 3 Encounters:   02/28/18 114/70   01/30/18 120/78   01/11/18 131/74    Weight from Last 3 Encounters:   02/28/18 190 lb 1.6 oz (86.2 kg)   01/30/18 193 lb 3.2 oz (87.6 kg)   01/04/18 189 lb (85.7 kg)              We Performed the Following     Lipid Profile     TSH          Today's Medication Changes          These changes are accurate as of 2/28/18  7:39 AM.  If you have any questions, ask your nurse or doctor.               These medicines have changed or have updated prescriptions.        Dose/Directions    simvastatin 20 MG tablet   Commonly known as:  ZOCOR   This may have changed:  See the new instructions.   Used for:  Hyperlipidemia LDL goal <160   Changed by:  Yon Gallagher MD        TAKE ONE TABLET BY MOUTH AT BEDTIME   Quantity:  90 tablet   Refills:  3            Where to get your medicines      These medications were sent to [facility] [address] [place] [address] [address]  [address] [facility] [address]     Phone:  [phone]     levothyroxine 50 MCG tablet    simvastatin 20 MG tablet                Primary Care Provider Office Phone # Fax #    Yon Gallagher [phone]       [facility] [address] [facility]   [facility] [address]        Equal Access to Services     [facility] ASHLEY AH: Hadii erinn parrish Sorand, waaxda luqadaha, qaybta kaalmada adeegyada, terri rasmussen. So [facility] [phone].    ATENCIÓN: Si habla español, tiene a ochoa disposición servicios gratuitos de asistencia lingüística. Llame al [phone].    We " comply with applicable federal civil rights laws and [place] laws. We do not discriminate on the basis of race, color, national origin, age, disability, sex, sexual orientation, or gender identity.            Thank you!     Thank you for choosing [facility]  for your care. Our goal is always to provide you with excellent care. Hearing back from our patients is one way we can continue to improve our services. Please take a few minutes to complete the written survey that you may receive in the mail after your visit with us. Thank you!             Your Updated Medication List - Protect others around you: Learn how to safely use, store and throw away your medicines at www.disposemymeds.org.          This list is accurate as of 2/28/18  7:39 AM.  Always use your most recent med list.                   Brand Name Dispense Instructions for use Diagnosis    levothyroxine 50 MCG tablet    SYNTHROID/LEVOTHROID    90 tablet    Take 1 tablet (50 mcg) by mouth daily    Other specified hypothyroidism       NAPROXEN PO      Take 660 mg by mouth 2 times daily as needed for moderate pain        simvastatin 20 MG tablet    ZOCOR    90 tablet    TAKE ONE TABLET BY MOUTH AT BEDTIME    Hyperlipidemia LDL goal <160

## 2018-03-01 RX ORDER — SIMVASTATIN 20 MG
TABLET ORAL
Qty: 90 TABLET | Refills: 0 | Status: SHIPPED | OUTPATIENT
Start: 2018-03-01 | End: 2018-07-31

## 2018-03-01 RX ORDER — LEVOTHYROXINE SODIUM 50 UG/1
TABLET ORAL
Qty: 90 TABLET | Refills: 0 | Status: SHIPPED | OUTPATIENT
Start: 2018-03-01 | End: 2019-01-15

## 2018-03-01 NOTE — TELEPHONE ENCOUNTER
Medication is being filled for 1 time refill only due to:  Patient needs labs TSH and LIpid check. Future labs ordered as listed.  Notified patient per comment section of RX.  ESTEFANIA Glass

## 2018-07-24 DIAGNOSIS — C43.30 MALIGNANT MELANOMA OF SKIN OF FACE (H): ICD-10-CM

## 2018-07-24 LAB
ALBUMIN SERPL-MCNC: 4 G/DL (ref 3.4–5)
ALP SERPL-CCNC: 60 U/L (ref 40–150)
ALT SERPL W P-5'-P-CCNC: 24 U/L (ref 0–50)
ANION GAP SERPL CALCULATED.3IONS-SCNC: 6 MMOL/L (ref 3–14)
AST SERPL W P-5'-P-CCNC: 25 U/L (ref 0–45)
BASOPHILS # BLD AUTO: 0 10E9/L (ref 0–0.2)
BASOPHILS NFR BLD AUTO: 0.6 %
BILIRUB SERPL-MCNC: 0.6 MG/DL (ref 0.2–1.3)
BUN SERPL-MCNC: 17 MG/DL (ref 7–30)
CALCIUM SERPL-MCNC: 8.9 MG/DL (ref 8.5–10.1)
CHLORIDE SERPL-SCNC: 108 MMOL/L (ref 94–109)
CO2 SERPL-SCNC: 29 MMOL/L (ref 20–32)
CREAT SERPL-MCNC: 1.03 MG/DL (ref 0.52–1.04)
DIFFERENTIAL METHOD BLD: NORMAL
EOSINOPHIL NFR BLD AUTO: 3.2 %
ERYTHROCYTE [DISTWIDTH] IN BLOOD BY AUTOMATED COUNT: 13.6 % (ref 10–15)
GFR SERPL CREATININE-BSD FRML MDRD: 53 ML/MIN/1.7M2
GLUCOSE SERPL-MCNC: 89 MG/DL (ref 70–99)
HCT VFR BLD AUTO: 44.2 % (ref 35–47)
HGB BLD-MCNC: 14.4 G/DL (ref 11.7–15.7)
IMM GRANULOCYTES # BLD: 0 10E9/L (ref 0–0.4)
IMM GRANULOCYTES NFR BLD: 0.4 %
LDH SERPL L TO P-CCNC: 215 U/L (ref 81–234)
LYMPHOCYTES # BLD AUTO: 2 10E9/L (ref 0.8–5.3)
LYMPHOCYTES NFR BLD AUTO: 40.2 %
MCH RBC QN AUTO: 29.2 PG (ref 26.5–33)
MCHC RBC AUTO-ENTMCNC: 32.6 G/DL (ref 31.5–36.5)
MCV RBC AUTO: 90 FL (ref 78–100)
MONOCYTES # BLD AUTO: 0.4 10E9/L (ref 0–1.3)
MONOCYTES NFR BLD AUTO: 7.3 %
NEUTROPHILS # BLD AUTO: 2.5 10E9/L (ref 1.6–8.3)
NEUTROPHILS NFR BLD AUTO: 48.3 %
NRBC # BLD AUTO: 0 10*3/UL
NRBC BLD AUTO-RTO: 0 /100
PLATELET # BLD AUTO: 224 10E9/L (ref 150–450)
POTASSIUM SERPL-SCNC: 4.3 MMOL/L (ref 3.4–5.3)
PROT SERPL-MCNC: 7.2 G/DL (ref 6.8–8.8)
RBC # BLD AUTO: 4.93 10E12/L (ref 3.8–5.2)
SODIUM SERPL-SCNC: 143 MMOL/L (ref 133–144)
WBC # BLD AUTO: 5.1 10E9/L (ref 4–11)

## 2018-07-24 PROCEDURE — 85025 COMPLETE CBC W/AUTO DIFF WBC: CPT | Performed by: INTERNAL MEDICINE

## 2018-07-24 PROCEDURE — 36415 COLL VENOUS BLD VENIPUNCTURE: CPT | Performed by: INTERNAL MEDICINE

## 2018-07-24 PROCEDURE — 80053 COMPREHEN METABOLIC PANEL: CPT | Performed by: INTERNAL MEDICINE

## 2018-07-24 PROCEDURE — 83615 LACTATE (LD) (LDH) ENZYME: CPT | Performed by: INTERNAL MEDICINE

## 2018-07-31 ENCOUNTER — ONCOLOGY VISIT (OUTPATIENT)
Dept: ONCOLOGY | Facility: CLINIC | Age: 68
End: 2018-07-31
Payer: COMMERCIAL

## 2018-07-31 VITALS
WEIGHT: 198.2 LBS | RESPIRATION RATE: 16 BRPM | HEART RATE: 97 BPM | SYSTOLIC BLOOD PRESSURE: 131 MMHG | BODY MASS INDEX: 33.02 KG/M2 | DIASTOLIC BLOOD PRESSURE: 78 MMHG | TEMPERATURE: 96.2 F | OXYGEN SATURATION: 97 % | HEIGHT: 65 IN

## 2018-07-31 DIAGNOSIS — E03.8 OTHER SPECIFIED HYPOTHYROIDISM: ICD-10-CM

## 2018-07-31 DIAGNOSIS — C43.30 MALIGNANT MELANOMA OF SKIN OF FACE (H): Primary | ICD-10-CM

## 2018-07-31 DIAGNOSIS — E78.5 HYPERLIPIDEMIA LDL GOAL <160: ICD-10-CM

## 2018-07-31 PROCEDURE — 99214 OFFICE O/P EST MOD 30 MIN: CPT | Performed by: INTERNAL MEDICINE

## 2018-07-31 ASSESSMENT — PAIN SCALES - GENERAL: PAINLEVEL: MILD PAIN (2)

## 2018-07-31 NOTE — PROGRESS NOTES
Hematology/ Oncology Follow-up Visit:  Jul 31, 2018    Reason for Visit:   Chief Complaint   Patient presents with     Oncology Clinic Visit     6 month follow up for Malignant melanoma of skin of face      Results     lab work completed on 7/24/18       Oncologic History:  Malignant melanoma of skin of face (H)  Elisabeth Voss is known malignant melanoma. she initially presented to her primary care provider on 06/26/2014. During that visit, she complained of a left lower jaw hyperpigmented mole that seems to be changing in color and size over the last 6 months. The mole was smaller in size but appeared to go slowly in time and became more hyperpigmented. There is no associated pain or other enlarged local lymphadenopathy. As a result of the patient's concern, she had excision of this lesion on the left lower chin on 06/20/2014, and the pathology revealed invasive nodular malignant melanoma, and the tumor size was 4 mm, and its thickness was 1.1 mm, and Robe level III. There was no ulceration. The margins were negative, but there was in situ melanoma about 0.46 mm from the peripheral margin and then invasive melanoma was present 0.66 mm from the nearest peripheral margin. The deep margin was about 2.5 mm from the tumor. There was no lymphovascular invasion, and the pathologic staging was T1a. She was subsequently seen by Dr. Eliza Foss of surgical oncology in consultation on 07/10/2014, and she is expected to have a followup PET scan. Her staging PET scan done on 07/11/2014 showed hypermetabolic left thyroid nodule measuring 1.5 x 1.2 cm, with SUV of 14.5. There was another peripherally calcified 1.1 cm right thyroid nodule that demonstrated no metabolic activity. No other lymph nodes or other hypermetabolic lesion. The surgical area appears to be well. There was no convincing evidence of malignancy anywhere else. CT of the maxillofacial area also showed no evidence of any subcutaneous mass or adenopathy in  "the surrounding area. The patient continues to feel well and is symptomatic. She is being followed by dermatology and oncology.      Interval History:  Patient returning today for follow-up visit.  She has been feeling well without any recent complaints weight loss night sweats fever or chills.  She denies any nausea vomiting or diarrhea.  She denies any new skin rash.  She denies any bone aches or pains.  She denies any shortness of breath or cough or wheezing.    Review Of Systems:  Constitutional: Negative for fever, chills, and night sweats.  Skin: negative.  Eyes: negative.  Ears/Nose/Throat: negative.  Respiratory: No shortness of breath, dyspnea on exertion, cough, or hemoptysis.  Cardiovascular: negative.  Gastrointestinal: negative.  Genitourinary: negative.  Musculoskeletal: negative.  Neurologic: negative.  Psychiatric: negative.  Hematologic/Lymphatic/Immunologic: negative.  Endocrine: negative.    All other ROS negative unless mentioned in interval history.    Past medical, social, surgical, and family histories reviewed.    Allergies:  Allergies as of 07/31/2018 - [place] as Reviewed 07/31/2018   Allergen Reaction Noted     No known drug allergies  04/10/2001       Current Medications:  Current Outpatient Prescriptions   Medication Sig Dispense Refill     levothyroxine (SYNTHROID/LEVOTHROID) 50 MCG tablet TAKE ONE TABLET BY MOUTH ONCE DAILY 90 tablet 0     levothyroxine (SYNTHROID/LEVOTHROID) 50 MCG tablet Take 1 tablet (50 mcg) by mouth daily 90 tablet 3     NAPROXEN PO Take 660 mg by mouth 2 times daily as needed for moderate pain       simvastatin (ZOCOR) 20 MG tablet TAKE ONE TABLET BY MOUTH AT BEDTIME 90 tablet 3     [DISCONTINUED] simvastatin (ZOCOR) 20 MG tablet TAKE ONE TABLET BY MOUTH AT BEDTIME 90 tablet 0        Physical Exam:  /78 (BP Location: Right arm, Patient Position: Chair, Cuff Size: Adult Regular)  Pulse 97  Temp 96.2  F (35.7  C) (Temporal)  Resp 16  Ht 1.651 m (5' 5\")  Wt " 89.9 kg (198 lb 3.2 oz)  SpO2 97%  BMI 32.98 kg/m2  Wt Readings from Last 12 Encounters:   07/31/18 89.9 kg (198 lb 3.2 oz)   02/28/18 86.2 kg (190 lb 1.6 oz)   01/30/18 87.6 kg (193 lb 3.2 oz)   01/04/18 85.7 kg (189 lb)   01/05/18 85.7 kg (189 lb)   11/13/17 88 kg (194 lb)   10/23/17 88 kg (194 lb)   10/04/17 88 kg (194 lb)   10/01/17 88 kg (194 lb)   07/27/17 85.1 kg (187 lb 9.6 oz)   02/27/17 85.9 kg (189 lb 6 oz)   02/08/17 86.1 kg (189 lb 14.4 oz)     ECOG performance status: 0  GENERAL APPEARANCE: Healthy, alert and in no acute distress.  HEENT: Sclerae anicteric. PERRLA. Oropharynx without ulcers, lesions, or thrush.  NECK: Supple. No asymmetry or masses.  LYMPHATICS: No palpable cervical, supraclavicular, axillary, or inguinal lymphadenopathy.  RESP: Lungs clear to auscultation bilaterally without rales, rhonchi or wheezes.  CARDIOVASCULAR: Regular rate and rhythm. Normal S1, S2; no S3 or S4. No murmur, gallop, or rub.  ABDOMEN: Soft, nontender. Bowel sounds normal. No palpable organomegaly or masses.  MUSCULOSKELETAL: Extremities without gross deformities noted. No edema of bilateral lower extremities.  SKIN: No suspicious lesions or rashes.  NEURO: Alert and oriented x 3. Cranial nerves II-XII grossly intact.  PSYCHIATRIC: Mentation and affect appear normal.    Laboratory/Imaging Studies:  Orders Only on 07/24/2018   Component Date Value Ref Range Status     WBC 07/24/2018 5.1  4.0 - 11.0 10e9/L Final     RBC Count 07/24/2018 4.93  3.8 - 5.2 10e12/L Final     Hemoglobin 07/24/2018 14.4  11.7 - 15.7 g/dL Final     Hematocrit 07/24/2018 44.2  35.0 - 47.0 % Final     MCV 07/24/2018 90  78 - 100 fl Final     MCH 07/24/2018 29.2  26.5 - 33.0 pg Final     MCHC 07/24/2018 32.6  31.5 - 36.5 g/dL Final     RDW 07/24/2018 13.6  10.0 - 15.0 % Final     Platelet Count 07/24/2018 224  150 - 450 10e9/L Final     Diff Method 07/24/2018 Automated Method   Final     % Neutrophils 07/24/2018 48.3  % Final     %  Lymphocytes 07/24/2018 40.2  % Final     % Monocytes 07/24/2018 7.3  % Final     % Eosinophils 07/24/2018 3.2  % Final     % Basophils 07/24/2018 0.6  % Final     % Immature Granulocytes 07/24/2018 0.4  % Final     Nucleated RBCs 07/24/2018 0  0 /100 Final     Absolute Neutrophil 07/24/2018 2.5  1.6 - 8.3 10e9/L Final     Absolute Lymphocytes 07/24/2018 2.0  0.8 - 5.3 10e9/L Final     Absolute Monocytes 07/24/2018 0.4  0.0 - 1.3 10e9/L Final     Absolute Basophils 07/24/2018 0.0  0.0 - 0.2 10e9/L Final     Abs Immature Granulocytes 07/24/2018 0.0  0 - 0.4 10e9/L Final     Absolute Nucleated RBC 07/24/2018 0.0   Final     Sodium 07/24/2018 143  133 - 144 mmol/L Final     Potassium 07/24/2018 4.3  3.4 - 5.3 mmol/L Final     Chloride 07/24/2018 108  94 - 109 mmol/L Final     Carbon Dioxide 07/24/2018 29  20 - 32 mmol/L Final     Anion Gap 07/24/2018 6  3 - 14 mmol/L Final     Glucose 07/24/2018 89  70 - 99 mg/dL Final     Urea Nitrogen 07/24/2018 17  7 - 30 mg/dL Final     Creatinine 07/24/2018 1.03  0.52 - 1.04 mg/dL Final     GFR Estimate 07/24/2018 53* >60 mL/min/1.7m2 Final    Non  GFR Calc     GFR Estimate If Black 07/24/2018 64  >60 mL/min/1.7m2 Final    African American GFR Calc     Calcium 07/24/2018 8.9  8.5 - 10.1 mg/dL Final     Bilirubin Total 07/24/2018 0.6  0.2 - 1.3 mg/dL Final     Albumin 07/24/2018 4.0  3.4 - 5.0 g/dL Final     Protein Total 07/24/2018 7.2  6.8 - 8.8 g/dL Final     Alkaline Phosphatase 07/24/2018 60  40 - 150 U/L Final     ALT 07/24/2018 24  0 - 50 U/L Final     AST 07/24/2018 25  0 - 45 U/L Final     Lactate Dehydrogenase 07/24/2018 215  81 - 234 U/L Final          Assessment and plan:    (C43.30) Malignant melanoma of skin of face (H)  (primary encounter diagnosis)  Patient continued to do well without any new symptoms.  There is no clinical evidence of disease recurrence.  I reviewed with the patient today most recent laboratory tests.  I will see the patient again  in 6 months or sooner if there are new developments or concerns.    (E78.5) Hyperlipidemia LDL goal <160  Patient currently on Zocor 20 mg orally daily.    (E03.8) Other specified hypothyroidism  Patient currently on levothyroxine 50 mcg daily.    The patient is ready to learn, no apparent learning barriers were identified.  Diagnosis and treatment plans were explained to the patient. The patient expressed understanding of the content. The patient asked appropriate questions. The patient questions were answered to her satisfaction.    Chart documentation with Dragon Voice recognition Software. Although reviewed after completion, some words and grammatical errors may remain.

## 2018-07-31 NOTE — NURSING NOTE
"Oncology Rooming Note    July 31, 2018 7:52 AM   Elisabeth Voss is a 68 year old female who presents for:    Chief Complaint   Patient presents with     Oncology Clinic Visit     6 month follow up for Malignant melanoma of skin of face      Results     lab work completed on 7/24/18     Initial Vitals: /78 (BP Location: Right arm, Patient Position: Chair, Cuff Size: Adult Regular)  Pulse 97  Temp 96.2  F (35.7  C) (Temporal)  Resp 16  Ht 1.651 m (5' 5\")  Wt 89.9 kg (198 lb 3.2 oz)  SpO2 97%  BMI 32.98 kg/m2 Estimated body mass index is 32.98 kg/(m^2) as calculated from the following:    Height as of this encounter: 1.651 m (5' 5\").    Weight as of this encounter: 89.9 kg (198 lb 3.2 oz). Body surface area is 2.03 meters squared.  Mild Pain (2) Comment: Data Unavailable   No LMP recorded. Patient has had a hysterectomy.  Allergies reviewed: Yes  Medications reviewed: Yes    Medications: Medication refills not needed today.  Pharmacy name entered into [facility]:    [facility] [address] [facility] [address] [place] [address] PHARMACY [facility] [address]  [facility] [address] [facility] [address] [facility]    Nausea, Vomiting: No  Fever/Chills:  No  Mouth Sores: No  Diarrhea/Constipation: No  Urination: No Concerns  Skin/Excessive dryness: Concerns (explain) - spots on arm & middle of back  Cracking, Peeling: No  Unusual Bruising/Bleeding: No  Respiratory/SOB: No Concerns  Neuropathy/Numbness&Tingling-Hands/Feet: No  Cognitive Disturbance-Memory: Yes (Please explain):   Sleep Concerns: No Concerns  Night Sweats:  No  Fatigue/Weakness: Yes (Please explain): tired  Light Headed or Dizzy: Yes (Please explain): sometimes  Appetite:  No Concerns  Able to drink 6 to 8 glasses of fluid in a day: No Concerns      Clinical concerns:   8 minutes for nursing intake (face to face time)     Mayra GUAMAN CMA              "

## 2018-07-31 NOTE — LETTER
7/31/2018         RE: Elisabeth Voss  [address]  [facility] [address]        Dear Colleague,    Thank you for referring your patient, Elisabeth Voss, to the [facility]. Please see a copy of my visit note below.    Hematology/ Oncology Follow-up Visit:  Jul 31, 2018    Reason for Visit:   Chief Complaint   Patient presents with     Oncology Clinic Visit     6 month follow up for Malignant melanoma of skin of face      Results     lab work completed on 7/24/18       Oncologic History:  Malignant melanoma of skin of face (H)  Elisabeth Voss is known malignant melanoma. she initially presented to her primary care provider on 06/26/2014. During that visit, she complained of a left lower jaw hyperpigmented mole that seems to be changing in color and size over the last 6 months. The mole was smaller in size but appeared to go slowly in time and became more hyperpigmented. There is no associated pain or other enlarged local lymphadenopathy. As a result of the patient's concern, she had excision of this lesion on the left lower chin on 06/20/2014, and the pathology revealed invasive nodular malignant melanoma, and the tumor size was 4 mm, and its thickness was 1.1 mm, and Robe level III. There was no ulceration. The margins were negative, but there was in situ melanoma about 0.46 mm from the peripheral margin and then invasive melanoma was present 0.66 mm from the nearest peripheral margin. The deep margin was about 2.5 mm from the tumor. There was no lymphovascular invasion, and the pathologic staging was T1a. She was subsequently seen by Dr. Eliza Foss of surgical oncology in consultation on 07/10/2014, and she is expected to have a followup PET scan. Her staging PET scan done on 07/11/2014 showed hypermetabolic left thyroid nodule measuring 1.5 x 1.2 cm, with SUV of 14.5. There was another peripherally calcified 1.1 cm right thyroid nodule that demonstrated no metabolic  activity. No other lymph nodes or other hypermetabolic lesion. The surgical area appears to be well. There was no convincing evidence of malignancy anywhere else. CT of the maxillofacial area also showed no evidence of any subcutaneous mass or adenopathy in the surrounding area. The patient continues to feel well and is symptomatic. She is being followed by dermatology and oncology.      Interval History:  Patient returning today for follow-up visit.  She has been feeling well without any recent complaints weight loss night sweats fever or chills.  She denies any nausea vomiting or diarrhea.  She denies any new skin rash.  She denies any bone aches or pains.  She denies any shortness of breath or cough or wheezing.    Review Of Systems:  Constitutional: Negative for fever, chills, and night sweats.  Skin: negative.  Eyes: negative.  Ears/Nose/Throat: negative.  Respiratory: No shortness of breath, dyspnea on exertion, cough, or hemoptysis.  Cardiovascular: negative.  Gastrointestinal: negative.  Genitourinary: negative.  Musculoskeletal: negative.  Neurologic: negative.  Psychiatric: negative.  Hematologic/Lymphatic/Immunologic: negative.  Endocrine: negative.    All other ROS negative unless mentioned in interval history.    Past medical, social, surgical, and family histories reviewed.    Allergies:  Allergies as of 07/31/2018 - [place] as Reviewed 07/31/2018   Allergen Reaction Noted     No known drug allergies  04/10/2001       Current Medications:  Current Outpatient Prescriptions   Medication Sig Dispense Refill     levothyroxine (SYNTHROID/LEVOTHROID) 50 MCG tablet TAKE ONE TABLET BY MOUTH ONCE DAILY 90 tablet 0     levothyroxine (SYNTHROID/LEVOTHROID) 50 MCG tablet Take 1 tablet (50 mcg) by mouth daily 90 tablet 3     NAPROXEN PO Take 660 mg by mouth 2 times daily as needed for moderate pain       simvastatin (ZOCOR) 20 MG tablet TAKE ONE TABLET BY MOUTH AT BEDTIME 90 tablet 3     [DISCONTINUED] simvastatin  "(ZOCOR) 20 MG tablet TAKE ONE TABLET BY MOUTH AT BEDTIME 90 tablet 0        Physical Exam:  /78 (BP Location: Right arm, Patient Position: Chair, Cuff Size: Adult Regular)  Pulse 97  Temp 96.2  F (35.7  C) (Temporal)  Resp 16  Ht 1.651 m (5' 5\")  Wt 89.9 kg (198 lb 3.2 oz)  SpO2 97%  BMI 32.98 kg/m2  Wt Readings from Last 12 Encounters:   07/31/18 89.9 kg (198 lb 3.2 oz)   02/28/18 86.2 kg (190 lb 1.6 oz)   01/30/18 87.6 kg (193 lb 3.2 oz)   01/04/18 85.7 kg (189 lb)   01/05/18 85.7 kg (189 lb)   11/13/17 88 kg (194 lb)   10/23/17 88 kg (194 lb)   10/04/17 88 kg (194 lb)   10/01/17 88 kg (194 lb)   07/27/17 85.1 kg (187 lb 9.6 oz)   02/27/17 85.9 kg (189 lb 6 oz)   02/08/17 86.1 kg (189 lb 14.4 oz)     ECOG performance status: 0  GENERAL APPEARANCE: Healthy, alert and in no acute distress.  HEENT: Sclerae anicteric. PERRLA. Oropharynx without ulcers, lesions, or thrush.  NECK: Supple. No asymmetry or masses.  LYMPHATICS: No palpable cervical, supraclavicular, axillary, or inguinal lymphadenopathy.  RESP: Lungs clear to auscultation bilaterally without rales, rhonchi or wheezes.  CARDIOVASCULAR: Regular rate and rhythm. Normal S1, S2; no S3 or S4. No murmur, gallop, or rub.  ABDOMEN: Soft, nontender. Bowel sounds normal. No palpable organomegaly or masses.  MUSCULOSKELETAL: Extremities without gross deformities noted. No edema of bilateral lower extremities.  SKIN: No suspicious lesions or rashes.  NEURO: Alert and oriented x 3. Cranial nerves II-XII grossly intact.  PSYCHIATRIC: Mentation and affect appear normal.    Laboratory/Imaging Studies:  Orders Only on 07/24/2018   Component Date Value Ref Range Status     WBC 07/24/2018 5.1  4.0 - 11.0 10e9/L Final     RBC Count 07/24/2018 4.93  3.8 - 5.2 10e12/L Final     Hemoglobin 07/24/2018 14.4  11.7 - 15.7 g/dL Final     Hematocrit 07/24/2018 44.2  35.0 - 47.0 % Final     MCV 07/24/2018 90  78 - 100 fl Final     MCH 07/24/2018 29.2  26.5 - 33.0 pg Final "     MCHC 07/24/2018 32.6  31.5 - 36.5 g/dL Final     RDW 07/24/2018 13.6  10.0 - 15.0 % Final     Platelet Count 07/24/2018 224  150 - 450 10e9/L Final     Diff Method 07/24/2018 Automated Method   Final     % Neutrophils 07/24/2018 48.3  % Final     % Lymphocytes 07/24/2018 40.2  % Final     % Monocytes 07/24/2018 7.3  % Final     % Eosinophils 07/24/2018 3.2  % Final     % Basophils 07/24/2018 0.6  % Final     % Immature Granulocytes 07/24/2018 0.4  % Final     Nucleated RBCs 07/24/2018 0  0 /100 Final     Absolute Neutrophil 07/24/2018 2.5  1.6 - 8.3 10e9/L Final     Absolute Lymphocytes 07/24/2018 2.0  0.8 - 5.3 10e9/L Final     Absolute Monocytes 07/24/2018 0.4  0.0 - 1.3 10e9/L Final     Absolute Basophils 07/24/2018 0.0  0.0 - 0.2 10e9/L Final     Abs Immature Granulocytes 07/24/2018 0.0  0 - 0.4 10e9/L Final     Absolute Nucleated RBC 07/24/2018 0.0   Final     Sodium 07/24/2018 143  133 - 144 mmol/L Final     Potassium 07/24/2018 4.3  3.4 - 5.3 mmol/L Final     Chloride 07/24/2018 108  94 - 109 mmol/L Final     Carbon Dioxide 07/24/2018 29  20 - 32 mmol/L Final     Anion Gap 07/24/2018 6  3 - 14 mmol/L Final     Glucose 07/24/2018 89  70 - 99 mg/dL Final     Urea Nitrogen 07/24/2018 17  7 - 30 mg/dL Final     Creatinine 07/24/2018 1.03  0.52 - 1.04 mg/dL Final     GFR Estimate 07/24/2018 53* >60 mL/min/1.7m2 Final    Non  GFR Calc     GFR Estimate If Black 07/24/2018 64  >60 mL/min/1.7m2 Final    African American GFR Calc     Calcium 07/24/2018 8.9  8.5 - 10.1 mg/dL Final     Bilirubin Total 07/24/2018 0.6  0.2 - 1.3 mg/dL Final     Albumin 07/24/2018 4.0  3.4 - 5.0 g/dL Final     Protein Total 07/24/2018 7.2  6.8 - 8.8 g/dL Final     Alkaline Phosphatase 07/24/2018 60  40 - 150 U/L Final     ALT 07/24/2018 24  0 - 50 U/L Final     AST 07/24/2018 25  0 - 45 U/L Final     Lactate Dehydrogenase 07/24/2018 215  81 - 234 U/L Final          Assessment and plan:    (C43.30) Malignant melanoma of  skin of face (H)  (primary encounter diagnosis)  Patient continued to do well without any new symptoms.  There is no clinical evidence of disease recurrence.  I reviewed with the patient today most recent laboratory tests.  I will see the patient again in 6 months or sooner if there are new developments or concerns.    (E78.5) Hyperlipidemia LDL goal <160  Patient currently on Zocor 20 mg orally daily.    (E03.8) Other specified hypothyroidism  Patient currently on levothyroxine 50 mcg daily.    The patient is ready to learn, no apparent learning barriers were identified.  Diagnosis and treatment plans were explained to the patient. The patient expressed understanding of the content. The patient asked appropriate questions. The patient questions were answered to her satisfaction.    Chart documentation with Dragon Voice recognition Software. Although reviewed after completion, some words and grammatical errors may remain.    Again, thank you for allowing me to participate in the care of your patient.        Sincerely,        Dorothy Castellon MD

## 2018-07-31 NOTE — MR AVS SNAPSHOT
After Visit Summary   7/31/2018    Elisabeth Voss    MRN: [id number]           Patient Information     Date Of Birth          1950        Visit Information        Provider Department      7/31/2018 8:00 AM Dorothy Castellon MD [facility]        Today's Diagnoses     Malignant melanoma of skin of face (H)    -  1      Care Instructions      Please follow up with Dr. Castellon in 6 months.  Please schedule labs at least 1 day prior to follow up appointment.    Lab Date/Time:    Follow Up Date/Time:     If you have any questions or concerns please feel free to call.    If you need to reschedule please call:  Clinic or Lab Appointment - [phone]  Infusion - [phone]  Imaging - [phone]    Aleksander Schuler RN, BSN, OCN  M [facility]   Oncology/Hematology Care Coordinator RN  [facility]  [phone]              Follow-ups after your visit        Follow-up notes from your care team     Return in about 6 months (around 1/31/2019) for Blood work before next appointment.      Future tests that were ordered for you today     Open Future Orders        Priority Expected Expires Ordered    CBC with platelets differential Routine 1/31/2019 7/31/2019 7/31/2018    Comprehensive metabolic panel Routine 1/31/2019 7/31/2019 7/31/2018    Lactate Dehydrogenase Routine 1/31/2019 7/31/2019 7/31/2018            Who to contact     If you have questions or need follow up information about today's clinic visit or your schedule please contact [facility] directly at [phone].  Normal or non-critical lab and imaging results will be communicated to you by MyChart, letter or phone within 4 business days after the clinic has received the results. If you do not hear from us within 7 days, please contact the clinic through MyChart or phone. If you have a critical or abnormal lab result, we will notify you by phone as soon as possible.  Submit refill requests  "through [employer] or call your pharmacy and they will forward the refill request to us. Please allow 3 business days for your refill to be completed.          Additional Information About Your Visit        [employer] Information     [employer] gives you secure access to your electronic health record. If you see a primary care provider, you can also send messages to your care team and make appointments. If you have questions, please call your primary care clinic.  If you do not have a primary care provider, please call [phone] and they will assist you.        Care EveryWhere ID     This is your Care EveryWhere ID. This could be used by other organizations to access your [place] medical records  [id number]        Your Vitals Were     Pulse Temperature Respirations Height Pulse Oximetry BMI (Body Mass Index)    97 96.2  F (35.7  C) (Temporal) 16 1.651 m (5' 5\") 97% 32.98 kg/m2       Blood Pressure from Last 3 Encounters:   07/31/18 131/78   02/28/18 114/70   01/30/18 120/78    Weight from Last 3 Encounters:   07/31/18 89.9 kg (198 lb 3.2 oz)   02/28/18 86.2 kg (190 lb 1.6 oz)   01/30/18 87.6 kg (193 lb 3.2 oz)               Primary Care Provider Office Phone # Fax #    Yon Gallagher [phone] [address] [facility] [address]        Equal Access to Services     [facility] AH: Hadii aad ku hadasho Soomaali, waaxda luqadaha, qaybta kaalmada adeegyada, terri mallory haykamilah quiñonez . So [facility] [phone].    ATENCIÓN: Si habla español, tiene a ochoa disposición servicios gratuitos de asistencia lingüística. Llame al [phone].    We comply with applicable federal civil rights laws and [place] laws. We do not discriminate on the basis of race, color, national origin, age, disability, sex, sexual orientation, or gender identity.            Thank you!     Thank you for choosing [facility]  for your care. Our goal is always to provide you with excellent care. " Hearing back from our patients is one way we can continue to improve our services. Please take a few minutes to complete the written survey that you may receive in the mail after your visit with us. Thank you!             Your Updated Medication List - Protect others around you: Learn how to safely use, store and throw away your medicines at www.disposemymeds.org.          This list is accurate as of 7/31/18  8:11 AM.  Always use your most recent med list.                   Brand Name Dispense Instructions for use Diagnosis    * levothyroxine 50 MCG tablet    SYNTHROID/LEVOTHROID    90 tablet    Take 1 tablet (50 mcg) by mouth daily    Other specified hypothyroidism       * levothyroxine 50 MCG tablet    SYNTHROID/LEVOTHROID    90 tablet    TAKE ONE TABLET BY MOUTH ONCE DAILY    Other specified hypothyroidism       NAPROXEN PO      Take 660 mg by mouth 2 times daily as needed for moderate pain        simvastatin 20 MG tablet    ZOCOR    90 tablet    TAKE ONE TABLET BY MOUTH AT BEDTIME    Hyperlipidemia LDL goal <160       * Notice:  This list has 2 medication(s) that are the same as other medications prescribed for you. Read the directions carefully, and ask your doctor or other care provider to review them with you.

## 2018-07-31 NOTE — NURSING NOTE
DISCHARGE PLAN:  Next appointments: See patient instruction section  Departure Mode: Ambulatory  Accompanied by:   3 minutes for nursing discharge (face to face time)     Elisabeth Voss is here today for Oncology follow up.  Writing nurse seen patient after Medical Oncology appointment to address questions/concerns/coordinate care. Patient to follow up in 6 months with labs at least one day prior. Patient ambulated by nurse to  to schedule follow up and/or lab appointments.  Imaging scheduled if ordered. See patient instructions and Oncologist's Progress note for further details. Questions and concerns addressed to patient's satisfaction. Patient verbalized and demonstrated understanding of plan.  Contact information provided and patient is encouraged to call with any that arise in the interim of care.    Aleksander Schuler, RN, BSN, OCN   Oncology Care Coordinator RN  [facility]  [phone]  7/31/2018, 8:14 AM

## 2018-07-31 NOTE — PATIENT INSTRUCTIONS
Please follow up with Dr. Castellon in 6 months.  Please schedule labs at least 1 day prior to follow up appointment.    Lab Date/Time:    Follow Up Date/Time:     If you have any questions or concerns please feel free to call.    If you need to reschedule please call:  Clinic or Lab Appointment - [phone]  Infusion - [phone]  Imaging - [phone]    Aleksander Schuler RN, BSN, OCN  [facility]   Oncology/Hematology Care Coordinator RN  [facility]  [phone]

## 2018-08-30 ENCOUNTER — TRANSFERRED RECORDS (OUTPATIENT)
Dept: HEALTH INFORMATION MANAGEMENT | Facility: CLINIC | Age: 68
End: 2018-08-30

## 2018-11-19 ENCOUNTER — OFFICE VISIT (OUTPATIENT)
Dept: OPHTHALMOLOGY | Facility: CLINIC | Age: 68
End: 2018-11-19
Payer: COMMERCIAL

## 2018-11-19 DIAGNOSIS — H40.003 GLAUCOMA SUSPECT OF BOTH EYES: Primary | ICD-10-CM

## 2018-11-19 DIAGNOSIS — Z96.1 PSEUDOPHAKIA OF BOTH EYES: ICD-10-CM

## 2018-11-19 DIAGNOSIS — H52.203 MYOPIA OF BOTH EYES WITH ASTIGMATISM AND PRESBYOPIA: ICD-10-CM

## 2018-11-19 DIAGNOSIS — H52.13 MYOPIA OF BOTH EYES WITH ASTIGMATISM AND PRESBYOPIA: ICD-10-CM

## 2018-11-19 DIAGNOSIS — H52.4 MYOPIA OF BOTH EYES WITH ASTIGMATISM AND PRESBYOPIA: ICD-10-CM

## 2018-11-19 PROCEDURE — 92015 DETERMINE REFRACTIVE STATE: CPT | Performed by: OPHTHALMOLOGY

## 2018-11-19 PROCEDURE — 92083 EXTENDED VISUAL FIELD XM: CPT | Performed by: OPHTHALMOLOGY

## 2018-11-19 PROCEDURE — 92133 CPTRZD OPH DX IMG PST SGM ON: CPT | Performed by: OPHTHALMOLOGY

## 2018-11-19 PROCEDURE — 92014 COMPRE OPH EXAM EST PT 1/>: CPT | Performed by: OPHTHALMOLOGY

## 2018-11-19 ASSESSMENT — REFRACTION_MANIFEST
OS_SPHERE: -1.00
OD_AXIS: 105
OS_CYLINDER: +1.00
OD_CYLINDER: +0.75
OS_ADD: +2.50
OD_ADD: +2.50
OS_AXIS: 045
OD_SPHERE: -1.00

## 2018-11-19 ASSESSMENT — REFRACTION_WEARINGRX
OS_SPHERE: -1.00
SPECS_TYPE: PAL
OS_CYLINDER: +0.75
OS_AXIS: 046
OS_ADD: +2.50
OD_SPHERE: -1.00
OD_AXIS: 115
OD_CYLINDER: +0.75
OD_ADD: +2.50

## 2018-11-19 ASSESSMENT — TONOMETRY
OD_IOP_MMHG: 15
OS_IOP_MMHG: 16
IOP_METHOD: TONOPEN

## 2018-11-19 ASSESSMENT — CONF VISUAL FIELD
COMMENTS: HVF DONE TODAY
OS_NORMAL: 1
OD_NORMAL: 1

## 2018-11-19 ASSESSMENT — VISUAL ACUITY
OS_CC: 20/20
OS_CC: 20/15
METHOD: SNELLEN - LINEAR
CORRECTION_TYPE: GLASSES
OS_CC+: -2
OD_CC: 20/20
OD_CC: 20/15
OD_CC+: -2

## 2018-11-19 ASSESSMENT — SLIT LAMP EXAM - LIDS
COMMENTS: S/P BLEPH
COMMENTS: S/P BLEPH

## 2018-11-19 ASSESSMENT — EXTERNAL EXAM - RIGHT EYE: OD_EXAM: NORMAL

## 2018-11-19 ASSESSMENT — EXTERNAL EXAM - LEFT EYE: OS_EXAM: NORMAL

## 2018-11-19 ASSESSMENT — CUP TO DISC RATIO
OD_RATIO: 0.4
OS_RATIO: 0.45

## 2018-11-19 NOTE — NURSING NOTE
Patient presents with:  Glaucoma Suspect Evaluation: Pt denies any gtts.    Annual Eye Exam: No VA changes.      Referring Provider:  Yon Gallagher MD  [address] [place] [address]    Last Eye Exam:  11/13/17   Informant(s):  EMR   Symptoms:           Do you have eye pain now?:  No                WILLIE Romo

## 2018-11-19 NOTE — MR AVS SNAPSHOT
After Visit Summary   11/19/2018    Elisabeth Voss    MRN: [id number]           Patient Information     Date Of Birth          1950        Visit Information        Provider Department      11/19/2018 7:30 AM Jame Painter MD;  OPHTH NURSE ONLY [facility]        Today's Diagnoses     Glaucoma suspect of both eyes    -  1    Pseudophakia of both eyes        Myopia of both eyes with astigmatism and presbyopia          Care Instructions    In order for us to perform a thorough eye examination, your eyes were dilated.  The affects of the dilating drops last for 4- 6 hours.  You will be more sensitive to light and vision will be blurry up close.  Mydriatic sunglasses were given if needed.                Follow-ups after your visit        Follow-up notes from your care team     Return in about 1 year (around 11/19/2019) for Annual Eye Exam, Glaucoma check, Visual field and OCT.      Your next 10 appointments already scheduled     Jan 24, 2019  8:00 AM CST   LAB with NL LAB [facility] ([facility])    [address] [place] [id number] [phone]           Please do not eat 10-12 hours before your appointment if you are coming in fasting for labs on lipids, cholesterol, or glucose (sugar). This does not apply to pregnant women. Water, hot tea and black coffee (with nothing added) are okay. Do not drink other fluids, diet soda or chew gum.            Jan 29, 2019  8:00 AM CST   Return Visit with Dorothy Castellon MD   [facility]    [address] [place] [id number] [phone]              Who to contact     If you have questions or need follow up information about today's clinic visit or your schedule please contact [facility] directly at [phone].  Normal or non-critical lab and imaging results will be communicated to you by Freddy  letter or phone within 4 business days after the clinic has received the results. If you do not hear from us within 7 days, please contact the clinic through "[employer] or phone. If you have a critical or abnormal lab result, we will notify you by phone as soon as possible.  Submit refill requests through "[employer] or call your pharmacy and they will forward the refill request to us. Please allow 3 business days for your refill to be completed.          Additional Information About Your Visit        "[employer] Information     "[employer] gives you secure access to your electronic health record. If you see a primary care provider, you can also send messages to your care team and make appointments. If you have questions, please call your primary care clinic.  If you do not have a primary care provider, please call [phone] and they will assist you.      "[employer] is an electronic gateway that provides easy, online access to your medical records. With "[employer] you can request a clinic appointment, read your test results, renew a prescription or communicate with your care team.     To access your existing account, please contact your [facility] or call [phone] for assistance.        Care EveryWhere ID     This is your Care EveryWhere ID. This could be used by other organizations to access your [place] medical records  [id number]         Blood Pressure from Last 3 Encounters:   07/31/18 131/78   02/28/18 114/70   01/30/18 120/78    Weight from Last 3 Encounters:   07/31/18 89.9 kg (198 lb 3.2 oz)   02/28/18 86.2 kg (190 lb 1.6 oz)   01/30/18 87.6 kg (193 lb 3.2 oz)              We Performed the Following     EYE EXAM, EST PATIENT,COMPREHESV     HVF 24-2 OU     OCT Optic Nerve RNFL Optovue OU (both eyes)     REFRACTION        Primary Care Provider Office Phone # Fax #    Yon Gallagher [phone] [address] [facility] [address]        Equal Access to Services      DEE DEE RAMIREZ : Hadii aad ku shivani Gudino, waaxda luqadaha, qaybta kaalreymundo jossiemariettailene, terri shawnee haykamilah sanchezhannahatiya quiñonez . So [facility] [phone].    ATENCIÓN: Si habla español, tiene a ochoa disposición servicios gratuitos de asistencia lingüística. Llame al [phone].    We comply with applicable federal civil rights laws and [place] laws. We do not discriminate on the basis of race, color, national origin, age, disability, sex, sexual orientation, or gender identity.            Thank you!     Thank you for choosing [facility]  for your care. Our goal is always to provide you with excellent care. Hearing back from our patients is one way we can continue to improve our services. Please take a few minutes to complete the written survey that you may receive in the mail after your visit with us. Thank you!             Your Updated Medication List - Protect others around you: Learn how to safely use, store and throw away your medicines at www.disposemymeds.org.          This list is accurate as of 11/19/18  8:20 AM.  Always use your most recent med list.                   Brand Name Dispense Instructions for use Diagnosis    * levothyroxine 50 MCG tablet    SYNTHROID/LEVOTHROID    90 tablet    Take 1 tablet (50 mcg) by mouth daily    Other specified hypothyroidism       * levothyroxine 50 MCG tablet    SYNTHROID/LEVOTHROID    90 tablet    TAKE ONE TABLET BY MOUTH ONCE DAILY    Other specified hypothyroidism       NAPROXEN PO      Take 660 mg by mouth 2 times daily as needed for moderate pain        simvastatin 20 MG tablet    ZOCOR    90 tablet    TAKE ONE TABLET BY MOUTH AT BEDTIME    Hyperlipidemia LDL goal <160       * Notice:  This list has 2 medication(s) that are the same as other medications prescribed for you. Read the directions carefully, and ask your doctor or other care provider to review them with you.

## 2018-11-19 NOTE — PROGRESS NOTES
Assessment & Plan   Elisabeth Voss is a 68 year old female who presents with:   Review of systems for the eyes was negative other than the pertinent positives and negatives noted in the HPI.  History is obtained from the patient and .     Chief Complaint   Patient presents with     Glaucoma Suspect Evaluation     Pt denies any gtts.       Annual Eye Exam     No VA changes.     Glaucoma suspect of both eyes  ***  - OCT Optic Nerve RNFL Optovue OU (both eyes)  - HVF 24-2 OU    Pseudophakia of both eyes       Myopia of both eyes with astigmatism and presbyopia  - Rx per MR for glasses (optional)    - REFRACTION      ***    Return in about 1 year (around 11/19/2019) for Annual Eye Exam, Glaucoma check, Visual field and OCT.    Documentation for today's encounter was performed by Linda Isaac COA. OSC. Acting as a scribe in my presence. I have reviewed and verified that it is an accurate recording of today's encounter.    Attending Physician Attestation:  Complete documentation of historical and exam elements from today's encounter can be found in the full encounter summary report (not reduplicated in this progress note).  I personally obtained the chief complaint(s) and history of present illness.  I confirmed and edited as necessary the review of systems, past medical/surgical history, family history, social history, and examination findings as documented by others; and I examined the patient myself.  I personally reviewed the relevant tests, images, and reports as documented above.  I formulated and edited as necessary the assessment and plan and discussed the findings and management plan with the patient and family. - Jame Painter MD

## 2018-11-19 NOTE — PROGRESS NOTES
Assessment & Plan   Elisabeth Voss is a 68 year old female who presents with:   Review of systems for the eyes was negative other than the pertinent positives and negatives noted in the HPI.    Glaucoma suspect of both eyes  - OCT Optic Nerve RNFL Optovue OU (both eyes)  - HVF 24-2 OU  - EYE EXAM, EST PATIENT,COMPREHESV    Pseudophakia of both eyes  - EYE EXAM, EST PATIENT,COMPREHESV    Myopia of both eyes with astigmatism and presbyopia  - REFRACTION  - EYE EXAM, EST PATIENT,COMPREHESV      Return in 12 months for (HVF 24-2 and RNFL OCT)      Attending Physician Attestation:  Complete documentation of historical and exam elements from today's encounter can be found in the full encounter summary report (not reduplicated in this progress note).  I personally obtained the chief complaint(s) and history of present illness.  I confirmed and edited as necessary the review of systems, past medical/surgical history, family history, social history, and examination findings as documented by others; and I examined the patient myself.  I personally reviewed the relevant tests, images, and reports as documented above.  I formulated and edited as necessary the assessment and plan and discussed the findings and management plan with the patient and family. - Jame Painter MD

## 2019-01-10 DIAGNOSIS — E03.8 OTHER SPECIFIED HYPOTHYROIDISM: ICD-10-CM

## 2019-01-10 DIAGNOSIS — C43.30 MALIGNANT MELANOMA OF SKIN OF FACE (H): ICD-10-CM

## 2019-01-10 DIAGNOSIS — E78.5 HYPERLIPIDEMIA LDL GOAL <160: ICD-10-CM

## 2019-01-10 LAB
ALBUMIN SERPL-MCNC: 4.2 G/DL (ref 3.4–5)
ALP SERPL-CCNC: 60 U/L (ref 40–150)
ALT SERPL W P-5'-P-CCNC: 37 U/L (ref 0–50)
ANION GAP SERPL CALCULATED.3IONS-SCNC: 5 MMOL/L (ref 3–14)
AST SERPL W P-5'-P-CCNC: 29 U/L (ref 0–45)
BASOPHILS # BLD AUTO: 0 10E9/L (ref 0–0.2)
BASOPHILS NFR BLD AUTO: 0.7 %
BILIRUB SERPL-MCNC: 0.5 MG/DL (ref 0.2–1.3)
BUN SERPL-MCNC: 21 MG/DL (ref 7–30)
CALCIUM SERPL-MCNC: 9.2 MG/DL (ref 8.5–10.1)
CHLORIDE SERPL-SCNC: 108 MMOL/L (ref 94–109)
CHOLEST SERPL-MCNC: 221 MG/DL
CO2 SERPL-SCNC: 28 MMOL/L (ref 20–32)
CREAT SERPL-MCNC: 1.03 MG/DL (ref 0.52–1.04)
DIFFERENTIAL METHOD BLD: NORMAL
EOSINOPHIL NFR BLD AUTO: 2.6 %
ERYTHROCYTE [DISTWIDTH] IN BLOOD BY AUTOMATED COUNT: 14.8 % (ref 10–15)
GFR SERPL CREATININE-BSD FRML MDRD: 55 ML/MIN/{1.73_M2}
GLUCOSE SERPL-MCNC: 94 MG/DL (ref 70–99)
HCT VFR BLD AUTO: 43.7 % (ref 35–47)
HDLC SERPL-MCNC: 74 MG/DL
HGB BLD-MCNC: 14.3 G/DL (ref 11.7–15.7)
IMM GRANULOCYTES # BLD: 0 10E9/L (ref 0–0.4)
IMM GRANULOCYTES NFR BLD: 0.2 %
LDH SERPL L TO P-CCNC: 180 U/L (ref 81–234)
LDLC SERPL CALC-MCNC: 122 MG/DL
LYMPHOCYTES # BLD AUTO: 2.1 10E9/L (ref 0.8–5.3)
LYMPHOCYTES NFR BLD AUTO: 38.4 %
MCH RBC QN AUTO: 29.4 PG (ref 26.5–33)
MCHC RBC AUTO-ENTMCNC: 32.7 G/DL (ref 31.5–36.5)
MCV RBC AUTO: 90 FL (ref 78–100)
MONOCYTES # BLD AUTO: 0.5 10E9/L (ref 0–1.3)
MONOCYTES NFR BLD AUTO: 8.5 %
NEUTROPHILS # BLD AUTO: 2.7 10E9/L (ref 1.6–8.3)
NEUTROPHILS NFR BLD AUTO: 49.6 %
NONHDLC SERPL-MCNC: 147 MG/DL
NRBC # BLD AUTO: 0 10*3/UL
NRBC BLD AUTO-RTO: 0 /100
PLATELET # BLD AUTO: 252 10E9/L (ref 150–450)
POTASSIUM SERPL-SCNC: 4.3 MMOL/L (ref 3.4–5.3)
PROT SERPL-MCNC: 7.2 G/DL (ref 6.8–8.8)
RBC # BLD AUTO: 4.87 10E12/L (ref 3.8–5.2)
SODIUM SERPL-SCNC: 141 MMOL/L (ref 133–144)
TRIGL SERPL-MCNC: 124 MG/DL
TSH SERPL DL<=0.005 MIU/L-ACNC: 2.57 MU/L (ref 0.4–4)
WBC # BLD AUTO: 5.4 10E9/L (ref 4–11)

## 2019-01-10 PROCEDURE — 80061 LIPID PANEL: CPT | Performed by: INTERNAL MEDICINE

## 2019-01-10 PROCEDURE — 83615 LACTATE (LD) (LDH) ENZYME: CPT | Performed by: INTERNAL MEDICINE

## 2019-01-10 PROCEDURE — 84443 ASSAY THYROID STIM HORMONE: CPT | Performed by: INTERNAL MEDICINE

## 2019-01-10 PROCEDURE — 85025 COMPLETE CBC W/AUTO DIFF WBC: CPT | Performed by: INTERNAL MEDICINE

## 2019-01-10 PROCEDURE — 80053 COMPREHEN METABOLIC PANEL: CPT | Performed by: INTERNAL MEDICINE

## 2019-01-10 PROCEDURE — 36415 COLL VENOUS BLD VENIPUNCTURE: CPT | Performed by: INTERNAL MEDICINE

## 2019-01-15 ENCOUNTER — ONCOLOGY VISIT (OUTPATIENT)
Dept: ONCOLOGY | Facility: CLINIC | Age: 69
End: 2019-01-15
Payer: COMMERCIAL

## 2019-01-15 VITALS
OXYGEN SATURATION: 97 % | BODY MASS INDEX: 33.2 KG/M2 | DIASTOLIC BLOOD PRESSURE: 64 MMHG | SYSTOLIC BLOOD PRESSURE: 112 MMHG | HEART RATE: 70 BPM | TEMPERATURE: 96.9 F | WEIGHT: 194.5 LBS | HEIGHT: 64 IN | RESPIRATION RATE: 18 BRPM

## 2019-01-15 DIAGNOSIS — E03.9 HYPOTHYROIDISM, UNSPECIFIED TYPE: ICD-10-CM

## 2019-01-15 DIAGNOSIS — E78.5 HYPERLIPIDEMIA LDL GOAL <160: ICD-10-CM

## 2019-01-15 DIAGNOSIS — C43.30 MALIGNANT MELANOMA OF SKIN OF FACE (H): Primary | ICD-10-CM

## 2019-01-15 PROCEDURE — 99214 OFFICE O/P EST MOD 30 MIN: CPT | Performed by: INTERNAL MEDICINE

## 2019-01-15 ASSESSMENT — PAIN SCALES - GENERAL: PAINLEVEL: NO PAIN (0)

## 2019-01-15 ASSESSMENT — MIFFLIN-ST. JEOR: SCORE: 1396.22

## 2019-01-15 NOTE — NURSING NOTE
"Oncology Rooming Note    January 15, 2019 8:18 AM   Elisabeth Voss is a 68 year old female who presents for:    Chief Complaint   Patient presents with     Oncology Clinic Visit     6 month follow up for Malignant melanoma of skin of face      Results     labs completed 1/10/19     Initial Vitals: /64 (BP Location: Right arm, Patient Position: Chair, Cuff Size: Adult Regular)   Pulse 70   Temp 96.9  F (36.1  C) (Temporal)   Resp 18   Ht 1.632 m (5' 4.25\")   Wt 88.2 kg (194 lb 8 oz)   SpO2 97%   BMI 33.13 kg/m   Estimated body mass index is 33.13 kg/m  as calculated from the following:    Height as of this encounter: 1.632 m (5' 4.25\").    Weight as of this encounter: 88.2 kg (194 lb 8 oz). Body surface area is 2 meters squared.  No Pain (0) Comment: Data Unavailable   No LMP recorded. Patient has had a hysterectomy.  Allergies reviewed: Yes  Medications reviewed: Yes    Medications: Medication refills not needed today.  Pharmacy name entered into [facility]:    [id number] - [place], [place] - [address] [facility] [place] [place] [facility] [address]  [facility] [address] [place] [address]      4 minutes for nursing intake (face to face time)     Mayra MANN CMA              "

## 2019-01-15 NOTE — NURSING NOTE
DISCHARGE PLAN:  Next appointments: See patient instruction section  Departure Mode: Ambulatory  Accompanied by:   0 minutes for nursing discharge (face to face time)     Elisabeth Voss is here today for Oncology follow up.  Patient was not seen by writing nurse at time of appointment. Patient did stop and schedule follow up for 6 months.  AVS completed with instructions and mailed to patient.  See patient instructions and Oncologist's Progress note for further details. Questions and concerns addressed to patient's satisfaction. Patient verbalized and demonstrated understanding of plan.  Contact information provided and patient is encouraged to call with any that arise in the interim of care.    Aleksander Schuler, RN, BSN, OCN   Oncology Care Coordinator RN  [facility]  [phone]  1/15/2019, 8:36 AM

## 2019-01-15 NOTE — PATIENT INSTRUCTIONS
Please follow up with Dr. Castellon in 6 months.      Office visit follow up with Dr. Castellon Date/Time: 7/16/19 at 9:30  Please come 20-30 minutes early for labs    If you have any questions or concerns please feel free to call.    If you need to reschedule please call:  Clinic or Lab Appointment - [phone]  Infusion - [phone]  Imaging - [phone]    Aleksander Schuler, RN, BSN, OCN  [facility]   Oncology/Hematology Care Coordinator RN  [facility]  [phone]

## 2019-01-15 NOTE — PROGRESS NOTES
Hematology/ Oncology Follow-up Visit:  Rory 15, 2019    Reason for Visit:   Chief Complaint   Patient presents with     Oncology Clinic Visit     6 month follow up for Malignant melanoma of skin of face      Results     labs completed 1/10/19       Oncologic History:    Malignant melanoma of skin of face (H)  Elisabeth Voss is known malignant melanoma. she initially presented to her primary care provider on 06/26/2014. During that visit, she complained of a left lower jaw hyperpigmented mole that seems to be changing in color and size over the last 6 months. The mole was smaller in size but appeared to go slowly in time and became more hyperpigmented. There is no associated pain or other enlarged local lymphadenopathy. As a result of the patient's concern, she had excision of this lesion on the left lower chin on 06/20/2014, and the pathology revealed invasive nodular malignant melanoma, and the tumor size was 4 mm, and its thickness was 1.1 mm, and Robe level III. There was no ulceration. The margins were negative, but there was in situ melanoma about 0.46 mm from the peripheral margin and then invasive melanoma was present 0.66 mm from the nearest peripheral margin. The deep margin was about 2.5 mm from the tumor. There was no lymphovascular invasion, and the pathologic staging was T1a. She was subsequently seen by Dr. Eliza Foss of surgical oncology in consultation on 07/10/2014, and she is expected to have a followup PET scan. Her staging PET scan done on 07/11/2014 showed hypermetabolic left thyroid nodule measuring 1.5 x 1.2 cm, with SUV of 14.5. There was another peripherally calcified 1.1 cm right thyroid nodule that demonstrated no metabolic activity. No other lymph nodes or other hypermetabolic lesion. The surgical area appears to be well. There was no convincing evidence of malignancy anywhere else. CT of the maxillofacial area also showed no evidence of any subcutaneous mass or adenopathy in the  "surrounding area. The patient continues to feel well and is symptomatic. She is being followed by dermatology and oncology.    Interval History:  Patient returning today for follow-up visit.  She has been feeling well without any recent complaints weight loss night sweats fever chills patient denies any nausea vomiting or diarrhea.  She is noted some increasing forgetfulness.  Otherwise she denies any recent neurologic symptoms.  She is seen every 6 months by dermatology for surveillance.    Review Of Systems:  Constitutional: Negative for fever, chills, and night sweats.  Skin: negative.  Eyes: negative.  Ears/Nose/Throat: negative.  Respiratory: No shortness of breath, dyspnea on exertion, cough, or hemoptysis.  Cardiovascular: negative.  Gastrointestinal: negative.  Genitourinary: negative.  Musculoskeletal: negative.  Neurologic: negative.  Psychiatric: negative.  Hematologic/Lymphatic/Immunologic: negative.  Endocrine: negative.    All other ROS negative unless mentioned in interval history.    Past medical, social, surgical, and family histories reviewed.    Allergies:  Allergies as of 01/15/2019 - Reviewed 01/15/2019   Allergen Reaction Noted     No known drug allergies  04/10/2001       Current Medications:  Current Outpatient Medications   Medication Sig Dispense Refill     RANITIDINE HCL PO Take 50 mg by mouth       levothyroxine (SYNTHROID/LEVOTHROID) 50 MCG tablet Take 1 tablet (50 mcg) by mouth daily 90 tablet 3     NAPROXEN PO Take 660 mg by mouth 2 times daily as needed for moderate pain       simvastatin (ZOCOR) 20 MG tablet TAKE ONE TABLET BY MOUTH AT BEDTIME 90 tablet 3        Physical Exam:  /64 (BP Location: Right arm, Patient Position: Chair, Cuff Size: Adult Regular)   Pulse 70   Temp 96.9  F (36.1  C) (Temporal)   Resp 18   Ht 1.632 m (5' 4.25\")   Wt 88.2 kg (194 lb 8 oz)   SpO2 97%   BMI 33.13 kg/m    Wt Readings from Last 12 Encounters:   01/15/19 88.2 kg (194 lb 8 oz) "   07/31/18 89.9 kg (198 lb 3.2 oz)   02/28/18 86.2 kg (190 lb 1.6 oz)   01/30/18 87.6 kg (193 lb 3.2 oz)   01/04/18 85.7 kg (189 lb)   01/05/18 85.7 kg (189 lb)   11/13/17 88 kg (194 lb)   10/23/17 88 kg (194 lb)   10/04/17 88 kg (194 lb)   10/01/17 88 kg (194 lb)   07/27/17 85.1 kg (187 lb 9.6 oz)   02/27/17 85.9 kg (189 lb 6 oz)     ECOG performance status: 0  GENERAL APPEARANCE: Healthy, alert and in no acute distress.  HEENT: Sclerae anicteric. PERRLA. Oropharynx without ulcers, lesions, or thrush.  NECK: Supple. No asymmetry or masses.  LYMPHATICS: No palpable cervical, supraclavicular, axillary, or inguinal lymphadenopathy.  RESP: Lungs clear to auscultation bilaterally without rales, rhonchi or wheezes.  CARDIOVASCULAR: Regular rate and rhythm. Normal S1, S2; no S3 or S4. No murmur, gallop, or rub.  ABDOMEN: Soft, nontender. Bowel sounds normal. No palpable organomegaly or masses.  MUSCULOSKELETAL: Extremities without gross deformities noted. No edema of bilateral lower extremities.  SKIN: No suspicious lesions or rashes.  NEURO: Alert and oriented x 3. Cranial nerves II-XII grossly intact.  PSYCHIATRIC: Mentation and affect appear normal.    Laboratory/Imaging Studies:  Orders Only on 01/10/2019   Component Date Value Ref Range Status     Lactate Dehydrogenase 01/10/2019 180  81 - 234 U/L Final     Sodium 01/10/2019 141  133 - 144 mmol/L Final     Potassium 01/10/2019 4.3  3.4 - 5.3 mmol/L Final     Chloride 01/10/2019 108  94 - 109 mmol/L Final     Carbon Dioxide 01/10/2019 28  20 - 32 mmol/L Final     Anion Gap 01/10/2019 5  3 - 14 mmol/L Final     Glucose 01/10/2019 94  70 - 99 mg/dL Final    Fasting specimen     Urea Nitrogen 01/10/2019 21  7 - 30 mg/dL Final     Creatinine 01/10/2019 1.03  0.52 - 1.04 mg/dL Final     GFR Estimate 01/10/2019 55* >60 mL/min/[1.73_m2] Final    Comment: Non  GFR Calc  Starting 12/18/2018, serum creatinine based estimated GFR (eGFR) will be   calculated using  the Chronic Kidney Disease Epidemiology Collaboration   (CKD-EPI) equation.       GFR Estimate If Black 01/10/2019 64  >60 mL/min/[1.73_m2] Final    Comment:  GFR Calc  Starting 12/18/2018, serum creatinine based estimated GFR (eGFR) will be   calculated using the Chronic Kidney Disease Epidemiology Collaboration   (CKD-EPI) equation.       Calcium 01/10/2019 9.2  8.5 - 10.1 mg/dL Final     Bilirubin Total 01/10/2019 0.5  0.2 - 1.3 mg/dL Final     Albumin 01/10/2019 4.2  3.4 - 5.0 g/dL Final     Protein Total 01/10/2019 7.2  6.8 - 8.8 g/dL Final     Alkaline Phosphatase 01/10/2019 60  40 - 150 U/L Final     ALT 01/10/2019 37  0 - 50 U/L Final     AST 01/10/2019 29  0 - 45 U/L Final     WBC 01/10/2019 5.4  4.0 - 11.0 10e9/L Final     RBC Count 01/10/2019 4.87  3.8 - 5.2 10e12/L Final     Hemoglobin 01/10/2019 14.3  11.7 - 15.7 g/dL Final     Hematocrit 01/10/2019 43.7  35.0 - 47.0 % Final     MCV 01/10/2019 90  78 - 100 fl Final     MCH 01/10/2019 29.4  26.5 - 33.0 pg Final     MCHC 01/10/2019 32.7  31.5 - 36.5 g/dL Final     RDW 01/10/2019 14.8  10.0 - 15.0 % Final     Platelet Count 01/10/2019 252  150 - 450 10e9/L Final     Diff Method 01/10/2019 Automated Method   Final     % Neutrophils 01/10/2019 49.6  % Final     % Lymphocytes 01/10/2019 38.4  % Final     % Monocytes 01/10/2019 8.5  % Final     % Eosinophils 01/10/2019 2.6  % Final     % Basophils 01/10/2019 0.7  % Final     % Immature Granulocytes 01/10/2019 0.2  % Final     Nucleated RBCs 01/10/2019 0  0 /100 Final     Absolute Neutrophil 01/10/2019 2.7  1.6 - 8.3 10e9/L Final     Absolute Lymphocytes 01/10/2019 2.1  0.8 - 5.3 10e9/L Final     Absolute Monocytes 01/10/2019 0.5  0.0 - 1.3 10e9/L Final     Absolute Basophils 01/10/2019 0.0  0.0 - 0.2 10e9/L Final     Abs Immature Granulocytes 01/10/2019 0.0  0 - 0.4 10e9/L Final     Absolute Nucleated RBC 01/10/2019 0.0   Final     Cholesterol 01/10/2019 221* <200 mg/dL Final    Desirable:        <200 mg/dl     Triglycerides 01/10/2019 124  <150 mg/dL Final    Fasting specimen     HDL Cholesterol 01/10/2019 74  >49 mg/dL Final     LDL Cholesterol Calculated 01/10/2019 122* <100 mg/dL Final    Comment: Above desirable:  100-129 mg/dl  Borderline High:  130-159 mg/dL  High:             160-189 mg/dL  Very high:       >189 mg/dl       Non HDL Cholesterol 01/10/2019 147* <130 mg/dL Final    Comment: Above Desirable:  130-159 mg/dl  Borderline high:  160-189 mg/dl  High:             190-219 mg/dl  Very high:       >219 mg/dl       TSH 01/10/2019 2.57  0.40 - 4.00 mU/L Final          Assessment and plan:    (C43.30) Malignant melanoma of skin of face (H)  (primary encounter diagnosis)  Reviewed with the patient today most recent laboratory test.  There is no clinical evidence of melanoma recurrence.  I will see the patient again in 6 months time or sooner if there are new developments or concerns.    (E78.5) Hyperlipidemia LDL goal <160  Patient currently on Zocor 20 mg orally daily.    (E03.9) Hypothyroidism, unspecified type  Patient currently on Synthroid 50 mcg orally daily.    The patient is ready to learn, no apparent learning barriers were identified.  Diagnosis and treatment plans were explained to the patient. The patient expressed understanding of the content. The patient asked appropriate questions. The patient questions were answered to her satisfaction.    Chart documentation with Dragon Voice recognition Software. Although reviewed after completion, some words and grammatical errors may remain.

## 2019-01-15 NOTE — LETTER
1/15/2019         RE: Elisabeth Voss  [address]  [place] [address]        Dear Colleague,    Thank you for referring your patient, Elisabeth Voss, to the [facility]. Please see a copy of my visit note below.    Hematology/ Oncology Follow-up Visit:  Rory 15, 2019    Reason for Visit:   Chief Complaint   Patient presents with     Oncology Clinic Visit     6 month follow up for Malignant melanoma of skin of face      Results     labs completed 1/10/19       Oncologic History:    Malignant melanoma of skin of face (H)  Elisabeth Voss is known malignant melanoma. she initially presented to her primary care provider on 06/26/2014. During that visit, she complained of a left lower jaw hyperpigmented mole that seems to be changing in color and size over the last 6 months. The mole was smaller in size but appeared to go slowly in time and became more hyperpigmented. There is no associated pain or other enlarged local lymphadenopathy. As a result of the patient's concern, she had excision of this lesion on the left lower chin on 06/20/2014, and the pathology revealed invasive nodular malignant melanoma, and the tumor size was 4 mm, and its thickness was 1.1 mm, and Robe level III. There was no ulceration. The margins were negative, but there was in situ melanoma about 0.46 mm from the peripheral margin and then invasive melanoma was present 0.66 mm from the nearest peripheral margin. The deep margin was about 2.5 mm from the tumor. There was no lymphovascular invasion, and the pathologic staging was T1a. She was subsequently seen by Dr. Eliza Foss of surgical oncology in consultation on 07/10/2014, and she is expected to have a followup PET scan. Her staging PET scan done on 07/11/2014 showed hypermetabolic left thyroid nodule measuring 1.5 x 1.2 cm, with SUV of 14.5. There was another peripherally calcified 1.1 cm right thyroid nodule that demonstrated no metabolic  activity. No other lymph nodes or other hypermetabolic lesion. The surgical area appears to be well. There was no convincing evidence of malignancy anywhere else. CT of the maxillofacial area also showed no evidence of any subcutaneous mass or adenopathy in the surrounding area. The patient continues to feel well and is symptomatic. She is being followed by dermatology and oncology.    Interval History:  Patient returning today for follow-up visit.  She has been feeling well without any recent complaints weight loss night sweats fever chills patient denies any nausea vomiting or diarrhea.  She is noted some increasing forgetfulness.  Otherwise she denies any recent neurologic symptoms.  She is seen every 6 months by dermatology for surveillance.    Review Of Systems:  Constitutional: Negative for fever, chills, and night sweats.  Skin: negative.  Eyes: negative.  Ears/Nose/Throat: negative.  Respiratory: No shortness of breath, dyspnea on exertion, cough, or hemoptysis.  Cardiovascular: negative.  Gastrointestinal: negative.  Genitourinary: negative.  Musculoskeletal: negative.  Neurologic: negative.  Psychiatric: negative.  Hematologic/Lymphatic/Immunologic: negative.  Endocrine: negative.    All other ROS negative unless mentioned in interval history.    Past medical, social, surgical, and family histories reviewed.    Allergies:  Allergies as of 01/15/2019 - Reviewed 01/15/2019   Allergen Reaction Noted     No known drug allergies  04/10/2001       Current Medications:  Current Outpatient Medications   Medication Sig Dispense Refill     RANITIDINE HCL PO Take 50 mg by mouth       levothyroxine (SYNTHROID/LEVOTHROID) 50 MCG tablet Take 1 tablet (50 mcg) by mouth daily 90 tablet 3     NAPROXEN PO Take 660 mg by mouth 2 times daily as needed for moderate pain       simvastatin (ZOCOR) 20 MG tablet TAKE ONE TABLET BY MOUTH AT BEDTIME 90 tablet 3        Physical Exam:  /64 (BP Location: Right arm, Patient  "Position: Chair, Cuff Size: Adult Regular)   Pulse 70   Temp 96.9  F (36.1  C) (Temporal)   Resp 18   Ht 1.632 m (5' 4.25\")   Wt 88.2 kg (194 lb 8 oz)   SpO2 97%   BMI 33.13 kg/m     Wt Readings from Last 12 Encounters:   01/15/19 88.2 kg (194 lb 8 oz)   07/31/18 89.9 kg (198 lb 3.2 oz)   02/28/18 86.2 kg (190 lb 1.6 oz)   01/30/18 87.6 kg (193 lb 3.2 oz)   01/04/18 85.7 kg (189 lb)   01/05/18 85.7 kg (189 lb)   11/13/17 88 kg (194 lb)   10/23/17 88 kg (194 lb)   10/04/17 88 kg (194 lb)   10/01/17 88 kg (194 lb)   07/27/17 85.1 kg (187 lb 9.6 oz)   02/27/17 85.9 kg (189 lb 6 oz)     ECOG performance status: 0  GENERAL APPEARANCE: Healthy, alert and in no acute distress.  HEENT: Sclerae anicteric. PERRLA. Oropharynx without ulcers, lesions, or thrush.  NECK: Supple. No asymmetry or masses.  LYMPHATICS: No palpable cervical, supraclavicular, axillary, or inguinal lymphadenopathy.  RESP: Lungs clear to auscultation bilaterally without rales, rhonchi or wheezes.  CARDIOVASCULAR: Regular rate and rhythm. Normal S1, S2; no S3 or S4. No murmur, gallop, or rub.  ABDOMEN: Soft, nontender. Bowel sounds normal. No palpable organomegaly or masses.  MUSCULOSKELETAL: Extremities without gross deformities noted. No edema of bilateral lower extremities.  SKIN: No suspicious lesions or rashes.  NEURO: Alert and oriented x 3. Cranial nerves II-XII grossly intact.  PSYCHIATRIC: Mentation and affect appear normal.    Laboratory/Imaging Studies:  Orders Only on 01/10/2019   Component Date Value Ref Range Status     Lactate Dehydrogenase 01/10/2019 180  81 - 234 U/L Final     Sodium 01/10/2019 141  133 - 144 mmol/L Final     Potassium 01/10/2019 4.3  3.4 - 5.3 mmol/L Final     Chloride 01/10/2019 108  94 - 109 mmol/L Final     Carbon Dioxide 01/10/2019 28  20 - 32 mmol/L Final     Anion Gap 01/10/2019 5  3 - 14 mmol/L Final     Glucose 01/10/2019 94  70 - 99 mg/dL Final    Fasting specimen     Urea Nitrogen 01/10/2019 21  7 - 30 " mg/dL Final     Creatinine 01/10/2019 1.03  0.52 - 1.04 mg/dL Final     GFR Estimate 01/10/2019 55* >60 mL/min/[1.73_m2] Final    Comment: Non  GFR Calc  Starting 12/18/2018, serum creatinine based estimated GFR (eGFR) will be   calculated using the Chronic Kidney Disease Epidemiology Collaboration   (CKD-EPI) equation.       GFR Estimate If Black 01/10/2019 64  >60 mL/min/[1.73_m2] Final    Comment:  GFR Calc  Starting 12/18/2018, serum creatinine based estimated GFR (eGFR) will be   calculated using the Chronic Kidney Disease Epidemiology Collaboration   (CKD-EPI) equation.       Calcium 01/10/2019 9.2  8.5 - 10.1 mg/dL Final     Bilirubin Total 01/10/2019 0.5  0.2 - 1.3 mg/dL Final     Albumin 01/10/2019 4.2  3.4 - 5.0 g/dL Final     Protein Total 01/10/2019 7.2  6.8 - 8.8 g/dL Final     Alkaline Phosphatase 01/10/2019 60  40 - 150 U/L Final     ALT 01/10/2019 37  0 - 50 U/L Final     AST 01/10/2019 29  0 - 45 U/L Final     WBC 01/10/2019 5.4  4.0 - 11.0 10e9/L Final     RBC Count 01/10/2019 4.87  3.8 - 5.2 10e12/L Final     Hemoglobin 01/10/2019 14.3  11.7 - 15.7 g/dL Final     Hematocrit 01/10/2019 43.7  35.0 - 47.0 % Final     MCV 01/10/2019 90  78 - 100 fl Final     MCH 01/10/2019 29.4  26.5 - 33.0 pg Final     MCHC 01/10/2019 32.7  31.5 - 36.5 g/dL Final     RDW 01/10/2019 14.8  10.0 - 15.0 % Final     Platelet Count 01/10/2019 252  150 - 450 10e9/L Final     Diff Method 01/10/2019 Automated Method   Final     % Neutrophils 01/10/2019 49.6  % Final     % Lymphocytes 01/10/2019 38.4  % Final     % Monocytes 01/10/2019 8.5  % Final     % Eosinophils 01/10/2019 2.6  % Final     % Basophils 01/10/2019 0.7  % Final     % Immature Granulocytes 01/10/2019 0.2  % Final     Nucleated RBCs 01/10/2019 0  0 /100 Final     Absolute Neutrophil 01/10/2019 2.7  1.6 - 8.3 10e9/L Final     Absolute Lymphocytes 01/10/2019 2.1  0.8 - 5.3 10e9/L Final     Absolute Monocytes 01/10/2019 0.5  0.0 -  1.3 10e9/L Final     Absolute Basophils 01/10/2019 0.0  0.0 - 0.2 10e9/L Final     Abs Immature Granulocytes 01/10/2019 0.0  0 - 0.4 10e9/L Final     Absolute Nucleated RBC 01/10/2019 0.0   Final     Cholesterol 01/10/2019 221* <200 mg/dL Final    Desirable:       <200 mg/dl     Triglycerides 01/10/2019 124  <150 mg/dL Final    Fasting specimen     HDL Cholesterol 01/10/2019 74  >49 mg/dL Final     LDL Cholesterol Calculated 01/10/2019 122* <100 mg/dL Final    Comment: Above desirable:  100-129 mg/dl  Borderline High:  130-159 mg/dL  High:             160-189 mg/dL  Very high:       >189 mg/dl       Non HDL Cholesterol 01/10/2019 147* <130 mg/dL Final    Comment: Above Desirable:  130-159 mg/dl  Borderline high:  160-189 mg/dl  High:             190-219 mg/dl  Very high:       >219 mg/dl       TSH 01/10/2019 2.57  0.40 - 4.00 mU/L Final          Assessment and plan:    (C43.30) Malignant melanoma of skin of face (H)  (primary encounter diagnosis)  Reviewed with the patient today most recent laboratory test.  There is no clinical evidence of melanoma recurrence.  I will see the patient again in 6 months time or sooner if there are new developments or concerns.    (E78.5) Hyperlipidemia LDL goal <160  Patient currently on Zocor 20 mg orally daily.    (E03.9) Hypothyroidism, unspecified type  Patient currently on Synthroid 50 mcg orally daily.    The patient is ready to learn, no apparent learning barriers were identified.  Diagnosis and treatment plans were explained to the patient. The patient expressed understanding of the content. The patient asked appropriate questions. The patient questions were answered to her satisfaction.    Chart documentation with Dragon Voice recognition Software. Although reviewed after completion, some words and grammatical errors may remain.    Again, thank you for allowing me to participate in the care of your patient.        Sincerely,        Dorothy Castellon MD

## 2019-03-01 DIAGNOSIS — E03.8 OTHER SPECIFIED HYPOTHYROIDISM: ICD-10-CM

## 2019-03-01 DIAGNOSIS — E78.5 HYPERLIPIDEMIA LDL GOAL <160: Primary | ICD-10-CM

## 2019-03-04 RX ORDER — LEVOTHYROXINE SODIUM 50 UG/1
TABLET ORAL
Qty: 90 TABLET | Refills: 2 | Status: SHIPPED | OUTPATIENT
Start: 2019-03-04 | End: 2019-03-05

## 2019-03-04 RX ORDER — SIMVASTATIN 20 MG
TABLET ORAL
Qty: 90 TABLET | Refills: 2 | Status: SHIPPED | OUTPATIENT
Start: 2019-03-04 | End: 2019-03-05

## 2019-03-04 NOTE — TELEPHONE ENCOUNTER
"ZOCOR  Last Written Prescription Date:  2/28/18  Last Fill Quantity: 90,  # refills: 3   Last office visit: 2/28/2018 with prescribing provider:     Future Office Visit:   Next 5 appointments (look out 90 days)    Mar 05, 2019  7:30 AM CST  PHYSICAL with Yon Gallagher MD  [address] [address]  [phone]         Levothyroxine  Last Written Prescription Date:  2/28/18  Last Fill Quantity: 90,  # refills: 3   Last office visit: 2/28/2018 with prescribing provider:     Future Office Visit:   Next 5 appointments (look out 90 days)    Mar 05, 2019  7:30 AM CST  PHYSICAL with Yon Gallagher MD  [address] [address]  [phone]         Requested Prescriptions   Pending Prescriptions Disp Refills     simvastatin (ZOCOR) 20 MG tablet [Pharmacy Med Name: SIMVASTATIN 20MG  TAB] 90 tablet 3     Sig: TAKE 1 TABLET BY MOUTH ONCE DAILY AT BEDTIME    Statins Protocol Passed - 3/1/2019  5:30 AM       Passed - LDL on file in past 12 months    Recent Labs   Lab Test 01/10/19  0749   *            Passed - No abnormal creatine kinase in past 12 months    No lab results found.            Passed - Recent (12 mo) or future (30 days) visit within the authorizing provider's specialty    Patient had office visit in the last 12 months or has a visit in the next 30 days with authorizing provider or within the authorizing provider's specialty.  See \"Patient Info\" tab in inbasket, or \"Choose Columns\" in Meds & Orders section of the refill encounter.             Passed - Medication is active on med list       Passed - Patient is age 18 or older       Passed - No active pregnancy on record       Passed - No positive pregnancy test in past 12 months        levothyroxine (SYNTHROID/LEVOTHROID) 50 MCG tablet [Pharmacy Med Name: LEVOTHYROXIN 50MCG  TAB] 90 tablet 3     Sig: TAKE 1 " "TABLET BY MOUTH ONCE DAILY    Thyroid Protocol Passed - 3/1/2019  5:30 AM       Passed - Patient is 12 years or older       Passed - Recent (12 mo) or future (30 days) visit within the authorizing provider's specialty    Patient had office visit in the last 12 months or has a visit in the next 30 days with authorizing provider or within the authorizing provider's specialty.  See \"Patient Info\" tab in inbasket, or \"Choose Columns\" in Meds & Orders section of the refill encounter.             Passed - Medication is active on med list       Passed - Normal TSH on file in past 12 months    Recent Labs   Lab Test 01/10/19  0749   TSH 2.57             Passed - No active pregnancy on record    If patient is pregnant or has had a positive pregnancy test, please check TSH.         Passed - No positive pregnancy test in past 12 months    If patient is pregnant or has had a positive pregnancy test, please check TSH.            "

## 2019-03-05 ENCOUNTER — OFFICE VISIT (OUTPATIENT)
Dept: INTERNAL MEDICINE | Facility: CLINIC | Age: 69
End: 2019-03-05
Payer: COMMERCIAL

## 2019-03-05 ENCOUNTER — HOSPITAL ENCOUNTER (OUTPATIENT)
Dept: MAMMOGRAPHY | Facility: CLINIC | Age: 69
Discharge: HOME | End: 2019-03-05
Attending: INTERNAL MEDICINE | Admitting: INTERNAL MEDICINE
Payer: COMMERCIAL

## 2019-03-05 VITALS
BODY MASS INDEX: 32.7 KG/M2 | WEIGHT: 192 LBS | RESPIRATION RATE: 16 BRPM | HEART RATE: 90 BPM | OXYGEN SATURATION: 98 % | TEMPERATURE: 97.3 F | DIASTOLIC BLOOD PRESSURE: 78 MMHG | SYSTOLIC BLOOD PRESSURE: 122 MMHG

## 2019-03-05 DIAGNOSIS — Z12.31 VISIT FOR SCREENING MAMMOGRAM: ICD-10-CM

## 2019-03-05 DIAGNOSIS — Z00.00 ENCOUNTER FOR ROUTINE ADULT HEALTH EXAMINATION WITHOUT ABNORMAL FINDINGS: ICD-10-CM

## 2019-03-05 DIAGNOSIS — E78.5 HYPERLIPIDEMIA LDL GOAL <160: ICD-10-CM

## 2019-03-05 DIAGNOSIS — Z00.00 MEDICARE ANNUAL WELLNESS VISIT, SUBSEQUENT: Primary | ICD-10-CM

## 2019-03-05 DIAGNOSIS — E03.8 OTHER SPECIFIED HYPOTHYROIDISM: ICD-10-CM

## 2019-03-05 PROCEDURE — 99397 PER PM REEVAL EST PAT 65+ YR: CPT | Performed by: INTERNAL MEDICINE

## 2019-03-05 PROCEDURE — 77063 BREAST TOMOSYNTHESIS BI: CPT

## 2019-03-05 RX ORDER — LEVOTHYROXINE SODIUM 50 UG/1
50 TABLET ORAL DAILY
Qty: 90 TABLET | Refills: 3 | Status: SHIPPED | OUTPATIENT
Start: 2019-03-05 | End: 2020-03-16

## 2019-03-05 RX ORDER — SIMVASTATIN 20 MG
TABLET ORAL
Qty: 90 TABLET | Refills: 3 | Status: SHIPPED | OUTPATIENT
Start: 2019-03-05 | End: 2020-03-16

## 2019-03-05 ASSESSMENT — ENCOUNTER SYMPTOMS
CHILLS: 0
ARTHRALGIAS: 0
HEARTBURN: 1
FEVER: 0
PARESTHESIAS: 0
MYALGIAS: 1
ABDOMINAL PAIN: 0
CONSTIPATION: 1
NERVOUS/ANXIOUS: 0
BREAST MASS: 0
WEAKNESS: 0
FREQUENCY: 0
DIZZINESS: 0
DYSURIA: 0
COUGH: 0
HEADACHES: 0
SHORTNESS OF BREATH: 0
HEMATOCHEZIA: 0
EYE PAIN: 0
DIARRHEA: 1
SORE THROAT: 0
NAUSEA: 0
HEMATURIA: 0
PALPITATIONS: 0
JOINT SWELLING: 0

## 2019-03-05 ASSESSMENT — ACTIVITIES OF DAILY LIVING (ADL): CURRENT_FUNCTION: NO ASSISTANCE NEEDED

## 2019-03-05 ASSESSMENT — PAIN SCALES - GENERAL: PAINLEVEL: NO PAIN (0)

## 2019-03-05 NOTE — PROGRESS NOTES
"SUBJECTIVE:   Elisabeth Voss is a 68 year old female who presents for Preventive Visit.    Are you in the first 12 months of your Medicare coverage?  No    Annual Wellness Visit     In general, how would you rate your overall health?  Good    Frequency of exercise:  2-3 days/week    Duration of exercise:  15-30 minutes    Do you usually eat at least 4 servings of fruit and vegetables a day, include whole grains    & fiber and avoid regularly eating high fat or \"junk\" foods?  Yes    Taking medications regularly:  Yes    Medication side effects:  None    Ability to successfully perform activities of daily living:  No assistance needed    Home Safety:  No safety concerns identified    Hearing Impairment:  Need to ask people to speak up or repeat themselves and difficulty understanding speech on the telephone    In the past 6 months, have you been bothered by leaking of urine?  No    In general, how would you rate your overall mental or emotional health?  Good    PHQ-2 Total Score: 2    Additional concerns today:  No    She is doing ok, has had a cold for many weeks.  Stuffiness, first was really sick.    Saw oncology and doing fine, no melanoma.    Mammogram today.      Do you feel safe in your environment? Yes    Do you have a Health Care Directive? Yes: Advance Directive has been received and scanned.    Fall risk       Cognitive Screening   1) Repeat 3 items (Leader, Season, Table)    2) Clock draw: NORMAL  3) 3 item recall: Recalls 2 objects   Results: NORMAL clock, 1-2 items recalled: COGNITIVE IMPAIRMENT LESS LIKELY    Mini-CogTM Copyright SHAILESH Sandhu. Licensed by the author for use in [facility]; reprinted with permission (jorge [email or web address].[email or web address]). All rights reserved.      Do you have sleep apnea, excessive snoring or daytime drowsiness?: no    Reviewed and updated as needed this visit by clinical staff  Allergies  Meds         Reviewed and updated as needed this visit by Provider        Social History "     Tobacco Use     Smoking status: Never Smoker     Smokeless tobacco: Never Used   Substance Use Topics     Alcohol use: Yes     Alcohol/week: 0.0 oz     Comment: occasional       Alcohol Use 3/5/2019   If you drink alcohol do you typically have greater than 3 drinks per day OR greater than 7 drinks per week? No     Current providers sharing in care for this patient include:   Patient Care Team:  Yon Gallagher MD as PCP - General (Internal Medicine)  Yon Gallagher MD as PCP - Assigned PCP  Aleksander Schuler RN as Registered Nurse (Hematology & Oncology)    The following health maintenance items are reviewed in Epic and correct as of today:  Health Maintenance   Topic Date Due     ZOSTER IMMUNIZATION (1 of 2) 06/14/1999     INFLUENZA VACCINE (1) 09/01/2018     MEDICARE ANNUAL WELLNESS VISIT  02/28/2019     FALL RISK ASSESSMENT  02/28/2019     PHQ-2 Q1 YR  02/28/2019     ADVANCE DIRECTIVE PLANNING Q5 YRS  08/11/2019     EYE EXAM Q1 YEAR  11/19/2019     LIPID MONITORING Q1 YEAR  01/10/2020     MAMMO SCREEN Q2 YR (SYSTEM ASSIGNED)  02/28/2020     COLON CANCER SCREEN (SYSTEM ASSIGNED)  03/22/2023     DTAP/TDAP/TD IMMUNIZATION (3 - Td) 02/23/2026     DEXA SCAN SCREENING (SYSTEM ASSIGNED)  Completed     HEPATITIS C SCREENING  Completed     IPV IMMUNIZATION  Aged Out     MENINGITIS IMMUNIZATION  Aged Out     Pneumonia Vaccine:already done    Review of Systems   Constitutional: Negative for chills and fever.   HENT: Positive for congestion. Negative for ear pain, hearing loss and sore throat.    Eyes: Negative for pain and visual disturbance.   Respiratory: Negative for cough and shortness of breath.    Cardiovascular: Negative for chest pain, palpitations and peripheral edema.   Gastrointestinal: Positive for constipation, diarrhea and heartburn. Negative for abdominal pain, hematochezia and nausea.   Breasts:  Negative for tenderness, breast mass and discharge.   Genitourinary: Negative for dysuria, frequency,  "genital sores, hematuria, pelvic pain, urgency, vaginal bleeding and vaginal discharge.   Musculoskeletal: Positive for myalgias. Negative for arthralgias and joint swelling.   Skin: Negative for rash.   Neurological: Negative for dizziness, weakness, headaches and paresthesias.   Psychiatric/Behavioral: Negative for mood changes. The patient is not nervous/anxious.        OBJECTIVE:   /78 (BP Location: Left arm, Patient Position: Sitting, Cuff Size: Adult Regular)   Pulse 90   Temp 97.3  F (36.3  C) (Temporal)   Resp 16   Wt 87.1 kg (192 lb)   SpO2 98%   BMI 32.70 kg/m   Estimated body mass index is 33.13 kg/m  as calculated from the following:    Height as of 1/15/19: 1.632 m (5' 4.25\").    Weight as of 1/15/19: 88.2 kg (194 lb 8 oz).  Physical Exam  GENERAL: healthy, alert and no distress  EYES: Eyes grossly normal to inspection, PERRL and conjunctivae and sclerae normal  HENT: ear canals and TM's normal, nose and mouth without ulcers or lesions  NECK: no adenopathy, no asymmetry, masses, or scars and thyroid normal to palpation  RESP: lungs clear to auscultation - no rales, rhonchi or wheezes  BREAST: deferred by patient.  CV: regular rate and rhythm, normal S1 S2, no S3 or S4, no murmur, click or rub, no peripheral edema and peripheral pulses strong  ABDOMEN: soft, nontender, no hepatosplenomegaly, no masses and bowel sounds normal  MS: no gross musculoskeletal defects noted, no edema  SKIN: no suspicious lesions or rashes  NEURO: Normal strength and tone, mentation intact and speech normal  PSYCH: mentation appears normal, affect normal/bright      ASSESSMENT / PLAN:       ICD-10-CM    1. Medicare annual wellness visit, subsequent Z00.00    2. Other specified hypothyroidism E03.8 levothyroxine (SYNTHROID/LEVOTHROID) 50 MCG tablet   3. Hyperlipidemia LDL goal <160 E78.5 simvastatin (ZOCOR) 20 MG tablet   4. Encounter for routine adult health examination without abnormal findings Z00.00      Overall " "she is doing pretty well. Needs to get back to walking, continue low carb diet for weight loss.    End of Life Planning:  Patient currently has an advanced directive: Yes.  Practitioner is supportive of decision.    COUNSELING:  Reviewed preventive health counseling, as reflected in patient instructions       Regular exercise       Healthy diet/nutrition    BP Readings from Last 1 Encounters:   01/15/19 112/64     Estimated body mass index is 33.13 kg/m  as calculated from the following:    Height as of 1/15/19: 1.632 m (5' 4.25\").    Weight as of 1/15/19: 88.2 kg (194 lb 8 oz).      Weight management plan: Discussed healthy diet and exercise guidelines     reports that  has never smoked. she has never used smokeless tobacco.      Appropriate preventive services were discussed with this patient, including applicable screening as appropriate for cardiovascular disease, diabetes, osteopenia/osteoporosis, and glaucoma.  As appropriate for age/gender, discussed screening for colorectal cancer, prostate cancer, breast cancer, and cervical cancer. Checklist reviewing preventive services available has been given to the patient.    Reviewed patients plan of care and provided an AVS. The Basic Care Plan (routine screening as documented in Health Maintenance) for Elisabeth meets the Care Plan requirement. This Care Plan has been established and reviewed with the Patient.    Counseling Resources:  ATP IV Guidelines  Pooled Cohorts Equation Calculator  Breast Cancer Risk Calculator  FRAX Risk Assessment  ICSI Preventive Guidelines  Dietary Guidelines for Americans, 2010  USDA's MyPlate  ASA Prophylaxis  Lung CA Screening    Yon Gallagher MD  [facility]  "

## 2019-06-06 ENCOUNTER — TRANSFERRED RECORDS (OUTPATIENT)
Dept: HEALTH INFORMATION MANAGEMENT | Facility: CLINIC | Age: 69
End: 2019-06-06

## 2019-07-16 ENCOUNTER — ONCOLOGY VISIT (OUTPATIENT)
Dept: ONCOLOGY | Facility: CLINIC | Age: 69
End: 2019-07-16
Payer: COMMERCIAL

## 2019-07-16 VITALS
HEART RATE: 65 BPM | BODY MASS INDEX: 32.21 KG/M2 | RESPIRATION RATE: 18 BRPM | WEIGHT: 193.3 LBS | TEMPERATURE: 98.2 F | HEIGHT: 65 IN | DIASTOLIC BLOOD PRESSURE: 68 MMHG | OXYGEN SATURATION: 98 % | SYSTOLIC BLOOD PRESSURE: 130 MMHG

## 2019-07-16 DIAGNOSIS — E03.2 HYPOTHYROIDISM DUE TO MEDICATION: ICD-10-CM

## 2019-07-16 DIAGNOSIS — E78.5 HYPERLIPIDEMIA LDL GOAL <160: ICD-10-CM

## 2019-07-16 DIAGNOSIS — C43.30 MALIGNANT MELANOMA OF SKIN OF FACE (H): Primary | ICD-10-CM

## 2019-07-16 LAB
ALBUMIN SERPL-MCNC: 3.9 G/DL (ref 3.4–5)
ALP SERPL-CCNC: 55 U/L (ref 40–150)
ALT SERPL W P-5'-P-CCNC: 26 U/L (ref 0–50)
ANION GAP SERPL CALCULATED.3IONS-SCNC: 4 MMOL/L (ref 3–14)
AST SERPL W P-5'-P-CCNC: 21 U/L (ref 0–45)
BASOPHILS # BLD AUTO: 0 10E9/L (ref 0–0.2)
BASOPHILS NFR BLD AUTO: 0.6 %
BILIRUB SERPL-MCNC: 0.5 MG/DL (ref 0.2–1.3)
BUN SERPL-MCNC: 19 MG/DL (ref 7–30)
CALCIUM SERPL-MCNC: 8.8 MG/DL (ref 8.5–10.1)
CHLORIDE SERPL-SCNC: 107 MMOL/L (ref 94–109)
CO2 SERPL-SCNC: 30 MMOL/L (ref 20–32)
CREAT SERPL-MCNC: 0.95 MG/DL (ref 0.52–1.04)
DIFFERENTIAL METHOD BLD: NORMAL
EOSINOPHIL NFR BLD AUTO: 2.2 %
ERYTHROCYTE [DISTWIDTH] IN BLOOD BY AUTOMATED COUNT: 14 % (ref 10–15)
GFR SERPL CREATININE-BSD FRML MDRD: 61 ML/MIN/{1.73_M2}
GLUCOSE SERPL-MCNC: 98 MG/DL (ref 70–99)
HCT VFR BLD AUTO: 42.3 % (ref 35–47)
HGB BLD-MCNC: 13.8 G/DL (ref 11.7–15.7)
IMM GRANULOCYTES # BLD: 0 10E9/L (ref 0–0.4)
IMM GRANULOCYTES NFR BLD: 0.2 %
LDH SERPL L TO P-CCNC: 178 U/L (ref 81–234)
LYMPHOCYTES # BLD AUTO: 1.6 10E9/L (ref 0.8–5.3)
LYMPHOCYTES NFR BLD AUTO: 35.1 %
MCH RBC QN AUTO: 29.3 PG (ref 26.5–33)
MCHC RBC AUTO-ENTMCNC: 32.6 G/DL (ref 31.5–36.5)
MCV RBC AUTO: 90 FL (ref 78–100)
MONOCYTES # BLD AUTO: 0.4 10E9/L (ref 0–1.3)
MONOCYTES NFR BLD AUTO: 8.6 %
NEUTROPHILS # BLD AUTO: 2.5 10E9/L (ref 1.6–8.3)
NEUTROPHILS NFR BLD AUTO: 53.3 %
NRBC # BLD AUTO: 0 10*3/UL
NRBC BLD AUTO-RTO: 0 /100
PLATELET # BLD AUTO: 225 10E9/L (ref 150–450)
POTASSIUM SERPL-SCNC: 4.1 MMOL/L (ref 3.4–5.3)
PROT SERPL-MCNC: 6.8 G/DL (ref 6.8–8.8)
RBC # BLD AUTO: 4.71 10E12/L (ref 3.8–5.2)
SODIUM SERPL-SCNC: 141 MMOL/L (ref 133–144)
WBC # BLD AUTO: 4.7 10E9/L (ref 4–11)

## 2019-07-16 PROCEDURE — 85025 COMPLETE CBC W/AUTO DIFF WBC: CPT | Performed by: INTERNAL MEDICINE

## 2019-07-16 PROCEDURE — 83615 LACTATE (LD) (LDH) ENZYME: CPT | Performed by: INTERNAL MEDICINE

## 2019-07-16 PROCEDURE — 80053 COMPREHEN METABOLIC PANEL: CPT | Performed by: INTERNAL MEDICINE

## 2019-07-16 PROCEDURE — 99214 OFFICE O/P EST MOD 30 MIN: CPT | Performed by: INTERNAL MEDICINE

## 2019-07-16 ASSESSMENT — MIFFLIN-ST. JEOR: SCORE: 1397.68

## 2019-07-16 NOTE — PATIENT INSTRUCTIONS
- Please follow up with Dr. Castellon in 1 year.   - Please come 20 to 30 minutes early, check in at the [facility] desk they will direct you to the specialty lab area.    Office visit follow up with Dr. Castellon Date/Time:  Please come 20 to 30 minutes early.    If you have any questions or concerns please feel free to call.    If you need to reschedule please call:  Clinic or Lab Appointment - [phone]  Infusion - [phone]  Imaging - [phone]    Mayra MANN CMA  [facility]  Oncology/Hematology at [facility]

## 2019-07-16 NOTE — PROGRESS NOTES
Hematology/ Oncology Follow-up Visit:  Jul 16, 2019    Reason for Visit:   Chief Complaint   Patient presents with     Oncology Clinic Visit     6 month follow up for Malignant melanoma of skin of face      Results     labs today       Oncologic History:  Malignant melanoma of skin of face (H)  Elisabeth Voss is known malignant melanoma. she initially presented to her primary care provider on 06/26/2014. During that visit, she complained of a left lower jaw hyperpigmented mole that seems to be changing in color and size over the last 6 months. The mole was smaller in size but appeared to go slowly in time and became more hyperpigmented. There is no associated pain or other enlarged local lymphadenopathy. As a result of the patient's concern, she had excision of this lesion on the left lower chin on 06/20/2014, and the pathology revealed invasive nodular malignant melanoma, and the tumor size was 4 mm, and its thickness was 1.1 mm, and Robe level III. There was no ulceration. The margins were negative, but there was in situ melanoma about 0.46 mm from the peripheral margin and then invasive melanoma was present 0.66 mm from the nearest peripheral margin. The deep margin was about 2.5 mm from the tumor. There was no lymphovascular invasion, and the pathologic staging was T1a. She was subsequently seen by Dr. Eliza Foss of surgical oncology in consultation on 07/10/2014, and she is expected to have a followup PET scan. Her staging PET scan done on 07/11/2014 showed hypermetabolic left thyroid nodule measuring 1.5 x 1.2 cm, with SUV of 14.5. There was another peripherally calcified 1.1 cm right thyroid nodule that demonstrated no metabolic activity. No other lymph nodes or other hypermetabolic lesion. The surgical area appears to be well. There was no convincing evidence of malignancy anywhere else. CT of the maxillofacial area also showed no evidence of any subcutaneous mass or adenopathy in the surrounding  "area. The patient continues to feel well and is symptomatic. She is being followed by dermatology and oncology.    Interval History:  Patient returning today for follow-up visit.  She has been feeling well without any recent complaints weight loss night sweats fever chills patient denies any nausea vomiting or diarrhea patient denies any shortness of breath or cough or wheezing.  Patient has been followed by dermatology most recent visit was in June without any suspicious lesions or abnormalities.    Review Of Systems:  Constitutional: Negative for fever, chills, and night sweats.  Skin: negative.  Eyes: negative.  Ears/Nose/Throat: negative.  Respiratory: No shortness of breath, dyspnea on exertion, cough, or hemoptysis.  Cardiovascular: negative.  Gastrointestinal: negative.  Genitourinary: negative.  Musculoskeletal: negative.  Neurologic: negative.  Psychiatric: negative.  Hematologic/Lymphatic/Immunologic: negative.  Endocrine: negative.    All other ROS negative unless mentioned in interval history.    Past medical, social, surgical, and family histories reviewed.    Allergies:  Allergies as of 07/16/2019 - Reviewed 07/16/2019   Allergen Reaction Noted     No known drug allergies  04/10/2001       Current Medications:  Current Outpatient Medications   Medication Sig Dispense Refill     HEMP OIL OR EXTRACT OR OTHER CBD CANNABINOID, NOT MEDICAL CANNABIS, daily       levothyroxine (SYNTHROID/LEVOTHROID) 50 MCG tablet Take 1 tablet (50 mcg) by mouth daily 90 tablet 3     simvastatin (ZOCOR) 20 MG tablet TAKE 1 TABLET BY MOUTH ONCE DAILY AT BEDTIME 90 tablet 3     NAPROXEN PO Take 660 mg by mouth 2 times daily as needed for moderate pain       RANITIDINE HCL PO Take 50 mg by mouth          Physical Exam:  /68   Pulse 65   Temp 98.2  F (36.8  C) (Temporal)   Resp 18   Ht 1.651 m (5' 5\")   Wt 87.7 kg (193 lb 4.8 oz)   SpO2 98%   BMI 32.17 kg/m    Wt Readings from Last 12 Encounters:   07/16/19 87.7 kg " (193 lb 4.8 oz)   03/05/19 87.1 kg (192 lb)   01/15/19 88.2 kg (194 lb 8 oz)   07/31/18 89.9 kg (198 lb 3.2 oz)   02/28/18 86.2 kg (190 lb 1.6 oz)   01/30/18 87.6 kg (193 lb 3.2 oz)   01/04/18 85.7 kg (189 lb)   01/05/18 85.7 kg (189 lb)   11/13/17 88 kg (194 lb)   10/23/17 88 kg (194 lb)   10/04/17 88 kg (194 lb)   10/01/17 88 kg (194 lb)     ECOG performance status: 0  GENERAL APPEARANCE: Healthy, alert and in no acute distress.  HEENT: Sclerae anicteric. PERRLA. Oropharynx without ulcers, lesions, or thrush.  NECK: Supple. No asymmetry or masses.  LYMPHATICS: No palpable cervical, supraclavicular, axillary, or inguinal lymphadenopathy.  RESP: Lungs clear to auscultation bilaterally without rales, rhonchi or wheezes.  CARDIOVASCULAR: Regular rate and rhythm. Normal S1, S2; no S3 or S4. No murmur, gallop, or rub.  ABDOMEN: Soft, nontender. Bowel sounds normal. No palpable organomegaly or masses.  MUSCULOSKELETAL: Extremities without gross deformities noted. No edema of bilateral lower extremities.  SKIN: No suspicious lesions or rashes.  NEURO: Alert and oriented x 3. Cranial nerves II-XII grossly intact.  PSYCHIATRIC: Mentation and affect appear normal.    Laboratory/Imaging Studies:  No visits with results within 2 Week(s) from this visit.   Latest known visit with results is:   Orders Only on 01/10/2019   Component Date Value Ref Range Status     Lactate Dehydrogenase 01/10/2019 180  81 - 234 U/L Final     Sodium 01/10/2019 141  133 - 144 mmol/L Final     Potassium 01/10/2019 4.3  3.4 - 5.3 mmol/L Final     Chloride 01/10/2019 108  94 - 109 mmol/L Final     Carbon Dioxide 01/10/2019 28  20 - 32 mmol/L Final     Anion Gap 01/10/2019 5  3 - 14 mmol/L Final     Glucose 01/10/2019 94  70 - 99 mg/dL Final    Fasting specimen     Urea Nitrogen 01/10/2019 21  7 - 30 mg/dL Final     Creatinine 01/10/2019 1.03  0.52 - 1.04 mg/dL Final     GFR Estimate 01/10/2019 55* >60 mL/min/[1.73_m2] Final    Comment: Non   American GFR Calc  Starting 12/18/2018, serum creatinine based estimated GFR (eGFR) will be   calculated using the Chronic Kidney Disease Epidemiology Collaboration   (CKD-EPI) equation.       GFR Estimate If Black 01/10/2019 64  >60 mL/min/[1.73_m2] Final    Comment:  GFR Calc  Starting 12/18/2018, serum creatinine based estimated GFR (eGFR) will be   calculated using the Chronic Kidney Disease Epidemiology Collaboration   (CKD-EPI) equation.       Calcium 01/10/2019 9.2  8.5 - 10.1 mg/dL Final     Bilirubin Total 01/10/2019 0.5  0.2 - 1.3 mg/dL Final     Albumin 01/10/2019 4.2  3.4 - 5.0 g/dL Final     Protein Total 01/10/2019 7.2  6.8 - 8.8 g/dL Final     Alkaline Phosphatase 01/10/2019 60  40 - 150 U/L Final     ALT 01/10/2019 37  0 - 50 U/L Final     AST 01/10/2019 29  0 - 45 U/L Final     WBC 01/10/2019 5.4  4.0 - 11.0 10e9/L Final     RBC Count 01/10/2019 4.87  3.8 - 5.2 10e12/L Final     Hemoglobin 01/10/2019 14.3  11.7 - 15.7 g/dL Final     Hematocrit 01/10/2019 43.7  35.0 - 47.0 % Final     MCV 01/10/2019 90  78 - 100 fl Final     MCH 01/10/2019 29.4  26.5 - 33.0 pg Final     MCHC 01/10/2019 32.7  31.5 - 36.5 g/dL Final     RDW 01/10/2019 14.8  10.0 - 15.0 % Final     Platelet Count 01/10/2019 252  150 - 450 10e9/L Final     Diff Method 01/10/2019 Automated Method   Final     % Neutrophils 01/10/2019 49.6  % Final     % Lymphocytes 01/10/2019 38.4  % Final     % Monocytes 01/10/2019 8.5  % Final     % Eosinophils 01/10/2019 2.6  % Final     % Basophils 01/10/2019 0.7  % Final     % Immature Granulocytes 01/10/2019 0.2  % Final     Nucleated RBCs 01/10/2019 0  0 /100 Final     Absolute Neutrophil 01/10/2019 2.7  1.6 - 8.3 10e9/L Final     Absolute Lymphocytes 01/10/2019 2.1  0.8 - 5.3 10e9/L Final     Absolute Monocytes 01/10/2019 0.5  0.0 - 1.3 10e9/L Final     Absolute Basophils 01/10/2019 0.0  0.0 - 0.2 10e9/L Final     Abs Immature Granulocytes 01/10/2019 0.0  0 - 0.4 10e9/L Final      Absolute Nucleated RBC 01/10/2019 0.0   Final     Cholesterol 01/10/2019 221* <200 mg/dL Final    Desirable:       <200 mg/dl     Triglycerides 01/10/2019 124  <150 mg/dL Final    Fasting specimen     HDL Cholesterol 01/10/2019 74  >49 mg/dL Final     LDL Cholesterol Calculated 01/10/2019 122* <100 mg/dL Final    Comment: Above desirable:  100-129 mg/dl  Borderline High:  130-159 mg/dL  High:             160-189 mg/dL  Very high:       >189 mg/dl       Non HDL Cholesterol 01/10/2019 147* <130 mg/dL Final    Comment: Above Desirable:  130-159 mg/dl  Borderline high:  160-189 mg/dl  High:             190-219 mg/dl  Very high:       >219 mg/dl       TSH 01/10/2019 2.57  0.40 - 4.00 mU/L Final        Assessment and plan:    (C43.30) Malignant melanoma of skin of face (H)  (primary encounter diagnosis)  I reviewed with patient most recent laboratory test.  There is no clinical evidence of disease recurrence.  I will see the patient in 1 year time or sooner if there are new developments or concerns.    (E78.5) Hyperlipidemia LDL goal <160  Currently on Zocor 20 mg orally daily.    (E03.2) Hypothyroidism due to medication  Patient currently on Synthroid 50 mcg orally daily.    The patient is ready to learn, no apparent learning barriers were identified.  Diagnosis and treatment plans were explained to the patient. The patient expressed understanding of the content. The patient asked appropriate questions. The patient questions were answered to her satisfaction.    Chart documentation with Dragon Voice recognition Software. Although reviewed after completion, some words and grammatical errors may remain.

## 2019-07-16 NOTE — NURSING NOTE
DISCHARGE PLAN:  Next appointments: See patient instruction section  Departure Mode: Ambulatory  Accompanied by: self  3 minutes for nursing discharge (face to face time)     Elisabeth PHIPPS martinez is here today for 1 year follow up.  Writing nurse seen patient after Medical Oncology appointment to address questions/concerns/coordinate care. Patient ambulated by nurse to  to schedule follow up and/or lab appointments.  RTC in 1 year with lab work. See patient instructions and Oncologist's Progress note for further details. Questions and concerns addressed to patient's satisfaction. Patient verbalized and demonstrated understanding of plan.  Contact information provided and patient is encouraged to call with any that arise in the interim of care.    Mayra MANN CMA   [facility]    Oncology/Hematology at [facility]  [phone]  7/16/2019, 9:55 AM

## 2019-07-16 NOTE — LETTER
7/16/2019         RE: Elisabeth Voss  [address]  [place] [address]        Dear Colleague,    Thank you for referring your patient, Elisabeth Voss, to the [facility]. Please see a copy of my visit note below.    Hematology/ Oncology Follow-up Visit:  Jul 16, 2019    Reason for Visit:   Chief Complaint   Patient presents with     Oncology Clinic Visit     6 month follow up for Malignant melanoma of skin of face      Results     labs today       Oncologic History:  Malignant melanoma of skin of face (H)  Elisabeth Voss is known malignant melanoma. she initially presented to her primary care provider on 06/26/2014. During that visit, she complained of a left lower jaw hyperpigmented mole that seems to be changing in color and size over the last 6 months. The mole was smaller in size but appeared to go slowly in time and became more hyperpigmented. There is no associated pain or other enlarged local lymphadenopathy. As a result of the patient's concern, she had excision of this lesion on the left lower chin on 06/20/2014, and the pathology revealed invasive nodular malignant melanoma, and the tumor size was 4 mm, and its thickness was 1.1 mm, and Robe level III. There was no ulceration. The margins were negative, but there was in situ melanoma about 0.46 mm from the peripheral margin and then invasive melanoma was present 0.66 mm from the nearest peripheral margin. The deep margin was about 2.5 mm from the tumor. There was no lymphovascular invasion, and the pathologic staging was T1a. She was subsequently seen by Dr. Eliza Foss of surgical oncology in consultation on 07/10/2014, and she is expected to have a followup PET scan. Her staging PET scan done on 07/11/2014 showed hypermetabolic left thyroid nodule measuring 1.5 x 1.2 cm, with SUV of 14.5. There was another peripherally calcified 1.1 cm right thyroid nodule that demonstrated no metabolic activity. No other  lymph nodes or other hypermetabolic lesion. The surgical area appears to be well. There was no convincing evidence of malignancy anywhere else. CT of the maxillofacial area also showed no evidence of any subcutaneous mass or adenopathy in the surrounding area. The patient continues to feel well and is symptomatic. She is being followed by dermatology and oncology.    Interval History:  Patient returning today for follow-up visit.  She has been feeling well without any recent complaints weight loss night sweats fever chills patient denies any nausea vomiting or diarrhea patient denies any shortness of breath or cough or wheezing.  Patient has been followed by dermatology most recent visit was in June without any suspicious lesions or abnormalities.    Review Of Systems:  Constitutional: Negative for fever, chills, and night sweats.  Skin: negative.  Eyes: negative.  Ears/Nose/Throat: negative.  Respiratory: No shortness of breath, dyspnea on exertion, cough, or hemoptysis.  Cardiovascular: negative.  Gastrointestinal: negative.  Genitourinary: negative.  Musculoskeletal: negative.  Neurologic: negative.  Psychiatric: negative.  Hematologic/Lymphatic/Immunologic: negative.  Endocrine: negative.    All other ROS negative unless mentioned in interval history.    Past medical, social, surgical, and family histories reviewed.    Allergies:  Allergies as of 07/16/2019 - Reviewed 07/16/2019   Allergen Reaction Noted     No known drug allergies  04/10/2001       Current Medications:  Current Outpatient Medications   Medication Sig Dispense Refill     HEMP OIL OR EXTRACT OR OTHER CBD CANNABINOID, NOT MEDICAL CANNABIS, daily       levothyroxine (SYNTHROID/LEVOTHROID) 50 MCG tablet Take 1 tablet (50 mcg) by mouth daily 90 tablet 3     simvastatin (ZOCOR) 20 MG tablet TAKE 1 TABLET BY MOUTH ONCE DAILY AT BEDTIME 90 tablet 3     NAPROXEN PO Take 660 mg by mouth 2 times daily as needed for moderate pain       RANITIDINE HCL PO  "Take 50 mg by mouth          Physical Exam:  /68   Pulse 65   Temp 98.2  F (36.8  C) (Temporal)   Resp 18   Ht 1.651 m (5' 5\")   Wt 87.7 kg (193 lb 4.8 oz)   SpO2 98%   BMI 32.17 kg/m     Wt Readings from Last 12 Encounters:   07/16/19 87.7 kg (193 lb 4.8 oz)   03/05/19 87.1 kg (192 lb)   01/15/19 88.2 kg (194 lb 8 oz)   07/31/18 89.9 kg (198 lb 3.2 oz)   02/28/18 86.2 kg (190 lb 1.6 oz)   01/30/18 87.6 kg (193 lb 3.2 oz)   01/04/18 85.7 kg (189 lb)   01/05/18 85.7 kg (189 lb)   11/13/17 88 kg (194 lb)   10/23/17 88 kg (194 lb)   10/04/17 88 kg (194 lb)   10/01/17 88 kg (194 lb)     ECOG performance status: 0  GENERAL APPEARANCE: Healthy, alert and in no acute distress.  HEENT: Sclerae anicteric. PERRLA. Oropharynx without ulcers, lesions, or thrush.  NECK: Supple. No asymmetry or masses.  LYMPHATICS: No palpable cervical, supraclavicular, axillary, or inguinal lymphadenopathy.  RESP: Lungs clear to auscultation bilaterally without rales, rhonchi or wheezes.  CARDIOVASCULAR: Regular rate and rhythm. Normal S1, S2; no S3 or S4. No murmur, gallop, or rub.  ABDOMEN: Soft, nontender. Bowel sounds normal. No palpable organomegaly or masses.  MUSCULOSKELETAL: Extremities without gross deformities noted. No edema of bilateral lower extremities.  SKIN: No suspicious lesions or rashes.  NEURO: Alert and oriented x 3. Cranial nerves II-XII grossly intact.  PSYCHIATRIC: Mentation and affect appear normal.    Laboratory/Imaging Studies:  No visits with results within 2 Week(s) from this visit.   Latest known visit with results is:   Orders Only on 01/10/2019   Component Date Value Ref Range Status     Lactate Dehydrogenase 01/10/2019 180  81 - 234 U/L Final     Sodium 01/10/2019 141  133 - 144 mmol/L Final     Potassium 01/10/2019 4.3  3.4 - 5.3 mmol/L Final     Chloride 01/10/2019 108  94 - 109 mmol/L Final     Carbon Dioxide 01/10/2019 28  20 - 32 mmol/L Final     Anion Gap 01/10/2019 5  3 - 14 mmol/L Final     " Glucose 01/10/2019 94  70 - 99 mg/dL Final    Fasting specimen     Urea Nitrogen 01/10/2019 21  7 - 30 mg/dL Final     Creatinine 01/10/2019 1.03  0.52 - 1.04 mg/dL Final     GFR Estimate 01/10/2019 55* >60 mL/min/[1.73_m2] Final    Comment: Non  GFR Calc  Starting 12/18/2018, serum creatinine based estimated GFR (eGFR) will be   calculated using the Chronic Kidney Disease Epidemiology Collaboration   (CKD-EPI) equation.       GFR Estimate If Black 01/10/2019 64  >60 mL/min/[1.73_m2] Final    Comment:  GFR Calc  Starting 12/18/2018, serum creatinine based estimated GFR (eGFR) will be   calculated using the Chronic Kidney Disease Epidemiology Collaboration   (CKD-EPI) equation.       Calcium 01/10/2019 9.2  8.5 - 10.1 mg/dL Final     Bilirubin Total 01/10/2019 0.5  0.2 - 1.3 mg/dL Final     Albumin 01/10/2019 4.2  3.4 - 5.0 g/dL Final     Protein Total 01/10/2019 7.2  6.8 - 8.8 g/dL Final     Alkaline Phosphatase 01/10/2019 60  40 - 150 U/L Final     ALT 01/10/2019 37  0 - 50 U/L Final     AST 01/10/2019 29  0 - 45 U/L Final     WBC 01/10/2019 5.4  4.0 - 11.0 10e9/L Final     RBC Count 01/10/2019 4.87  3.8 - 5.2 10e12/L Final     Hemoglobin 01/10/2019 14.3  11.7 - 15.7 g/dL Final     Hematocrit 01/10/2019 43.7  35.0 - 47.0 % Final     MCV 01/10/2019 90  78 - 100 fl Final     MCH 01/10/2019 29.4  26.5 - 33.0 pg Final     MCHC 01/10/2019 32.7  31.5 - 36.5 g/dL Final     RDW 01/10/2019 14.8  10.0 - 15.0 % Final     Platelet Count 01/10/2019 252  150 - 450 10e9/L Final     Diff Method 01/10/2019 Automated Method   Final     % Neutrophils 01/10/2019 49.6  % Final     % Lymphocytes 01/10/2019 38.4  % Final     % Monocytes 01/10/2019 8.5  % Final     % Eosinophils 01/10/2019 2.6  % Final     % Basophils 01/10/2019 0.7  % Final     % Immature Granulocytes 01/10/2019 0.2  % Final     Nucleated RBCs 01/10/2019 0  0 /100 Final     Absolute Neutrophil 01/10/2019 2.7  1.6 - 8.3 10e9/L Final      Absolute Lymphocytes 01/10/2019 2.1  0.8 - 5.3 10e9/L Final     Absolute Monocytes 01/10/2019 0.5  0.0 - 1.3 10e9/L Final     Absolute Basophils 01/10/2019 0.0  0.0 - 0.2 10e9/L Final     Abs Immature Granulocytes 01/10/2019 0.0  0 - 0.4 10e9/L Final     Absolute Nucleated RBC 01/10/2019 0.0   Final     Cholesterol 01/10/2019 221* <200 mg/dL Final    Desirable:       <200 mg/dl     Triglycerides 01/10/2019 124  <150 mg/dL Final    Fasting specimen     HDL Cholesterol 01/10/2019 74  >49 mg/dL Final     LDL Cholesterol Calculated 01/10/2019 122* <100 mg/dL Final    Comment: Above desirable:  100-129 mg/dl  Borderline High:  130-159 mg/dL  High:             160-189 mg/dL  Very high:       >189 mg/dl       Non HDL Cholesterol 01/10/2019 147* <130 mg/dL Final    Comment: Above Desirable:  130-159 mg/dl  Borderline high:  160-189 mg/dl  High:             190-219 mg/dl  Very high:       >219 mg/dl       TSH 01/10/2019 2.57  0.40 - 4.00 mU/L Final        Assessment and plan:    (C43.30) Malignant melanoma of skin of face (H)  (primary encounter diagnosis)  I reviewed with patient most recent laboratory test.  There is no clinical evidence of disease recurrence.  I will see the patient in 1 year time or sooner if there are new developments or concerns.    (E78.5) Hyperlipidemia LDL goal <160  Currently on Zocor 20 mg orally daily.    (E03.2) Hypothyroidism due to medication  Patient currently on Synthroid 50 mcg orally daily.    The patient is ready to learn, no apparent learning barriers were identified.  Diagnosis and treatment plans were explained to the patient. The patient expressed understanding of the content. The patient asked appropriate questions. The patient questions were answered to her satisfaction.    Chart documentation with Dragon Voice recognition Software. Although reviewed after completion, some words and grammatical errors may remain.    Again, thank you for allowing me to participate in the care of your  patient.        Sincerely,        Dorothy Castellon MD

## 2019-07-16 NOTE — NURSING NOTE
"Oncology Rooming Note    July 16, 2019 9:55 AM   Elisabeth Voss is a 69 year old female who presents for:    Chief Complaint   Patient presents with     Oncology Clinic Visit     6 month follow up for Malignant melanoma of skin of face      Results     labs today     Initial Vitals: /68   Pulse 65   Temp 98.2  F (36.8  C) (Temporal)   Resp 18   Ht 1.651 m (5' 5\")   Wt 87.7 kg (193 lb 4.8 oz)   SpO2 98%   BMI 32.17 kg/m   Estimated body mass index is 32.17 kg/m  as calculated from the following:    Height as of this encounter: 1.651 m (5' 5\").    Weight as of this encounter: 87.7 kg (193 lb 4.8 oz). Body surface area is 2.01 meters squared.  Data Unavailable Comment: Data Unavailable   No LMP recorded. Patient has had a hysterectomy.  Allergies reviewed: Yes  Medications reviewed: Yes    Medications: Medication refills not needed today.  Pharmacy name entered into [facility]:    [facility] [address] [facility] [address] [place] [address] PHARMACY [facility] [address] [place] [address] DR  [facility] [address] [facility] [address] [facility]    Clinical concerns: Tired, headache. Concerns reviewed with Dr. Castellon     5 minutes for nursing intake (face to face time)     Mayra MANN CMA              "

## 2019-09-28 ENCOUNTER — HEALTH MAINTENANCE LETTER (OUTPATIENT)
Age: 69
End: 2019-09-28

## 2019-10-09 ENCOUNTER — TRANSFERRED RECORDS (OUTPATIENT)
Dept: HEALTH INFORMATION MANAGEMENT | Facility: CLINIC | Age: 69
End: 2019-10-09

## 2019-11-01 ENCOUNTER — OFFICE VISIT (OUTPATIENT)
Dept: FAMILY MEDICINE | Facility: OTHER | Age: 69
End: 2019-11-01
Payer: COMMERCIAL

## 2019-11-01 VITALS
HEART RATE: 86 BPM | DIASTOLIC BLOOD PRESSURE: 70 MMHG | WEIGHT: 192 LBS | BODY MASS INDEX: 31.95 KG/M2 | TEMPERATURE: 98.9 F | SYSTOLIC BLOOD PRESSURE: 116 MMHG | OXYGEN SATURATION: 97 % | RESPIRATION RATE: 16 BRPM

## 2019-11-01 DIAGNOSIS — K21.00 GASTROESOPHAGEAL REFLUX DISEASE WITH ESOPHAGITIS: Primary | ICD-10-CM

## 2019-11-01 DIAGNOSIS — R19.5 DARK STOOLS: ICD-10-CM

## 2019-11-01 LAB
BASOPHILS # BLD AUTO: 0 10E9/L (ref 0–0.2)
BASOPHILS NFR BLD AUTO: 0.3 %
DIFFERENTIAL METHOD BLD: NORMAL
EOSINOPHIL # BLD AUTO: 0.1 10E9/L (ref 0–0.7)
EOSINOPHIL NFR BLD AUTO: 2.1 %
ERYTHROCYTE [DISTWIDTH] IN BLOOD BY AUTOMATED COUNT: 14.9 % (ref 10–15)
HCT VFR BLD AUTO: 43.7 % (ref 35–47)
HGB BLD-MCNC: 14.1 G/DL (ref 11.7–15.7)
LYMPHOCYTES # BLD AUTO: 2 10E9/L (ref 0.8–5.3)
LYMPHOCYTES NFR BLD AUTO: 31.4 %
MCH RBC QN AUTO: 28.3 PG (ref 26.5–33)
MCHC RBC AUTO-ENTMCNC: 32.3 G/DL (ref 31.5–36.5)
MCV RBC AUTO: 88 FL (ref 78–100)
MONOCYTES # BLD AUTO: 0.5 10E9/L (ref 0–1.3)
MONOCYTES NFR BLD AUTO: 7.5 %
NEUTROPHILS # BLD AUTO: 3.7 10E9/L (ref 1.6–8.3)
NEUTROPHILS NFR BLD AUTO: 58.7 %
PLATELET # BLD AUTO: 248 10E9/L (ref 150–450)
RBC # BLD AUTO: 4.98 10E12/L (ref 3.8–5.2)
WBC # BLD AUTO: 6.3 10E9/L (ref 4–11)

## 2019-11-01 PROCEDURE — 99203 OFFICE O/P NEW LOW 30 MIN: CPT | Performed by: NURSE PRACTITIONER

## 2019-11-01 PROCEDURE — 85025 COMPLETE CBC W/AUTO DIFF WBC: CPT | Performed by: NURSE PRACTITIONER

## 2019-11-01 PROCEDURE — 36415 COLL VENOUS BLD VENIPUNCTURE: CPT | Performed by: NURSE PRACTITIONER

## 2019-11-01 RX ORDER — PANTOPRAZOLE SODIUM 40 MG/1
40 TABLET, DELAYED RELEASE ORAL DAILY
Qty: 60 TABLET | Refills: 0 | Status: SHIPPED | OUTPATIENT
Start: 2019-11-01 | End: 2020-01-06

## 2019-11-01 NOTE — RESULT ENCOUNTER NOTE
Please advise Elisabeth Voss,  1950, that her lab results were normal cbc no indication of anemia or infection.   [phone] (home)   Thank you  Zahra Lara CNP

## 2019-11-01 NOTE — PATIENT INSTRUCTIONS
Please take Protonix prior to first meal of the day take this for 6 weeks if symptoms not improving would recommend having and upper endoscopy completed.     Do not stop this medication abruptly as to prevent rebound acid production.    Thank you  Zahra Lara CNP    Patient Education     Tips to Control Acid Reflux    To control acid reflux, you ll need to make some basic diet and lifestyle changes. The simple steps outlined below may be all you ll need to ease discomfort.  Watch what you eat    Avoid fatty foods and spicy foods.    Eat fewer acidic foods, such as citrus and tomato-based foods. These can increase symptoms.    Limit drinking alcohol, caffeine, and fizzy beverages. All increase acid reflux.    Try limiting chocolate, peppermint, and spearmint. These can worsen acid reflux in some people.  Watch when you eat    Avoid lying down for 3 hours after eating.    Do not snack before going to bed.  Raise your head  Raising your head and upper body by 4 to 6 inches helps limit reflux when you re lying down. Put blocks under the head of your bed frame to raise it.  Other changes    Lose weight, if you need to    Don t exercise near bedtime    Avoid tight-fitting clothes    Limit aspirin and ibuprofen    Stop smoking   Date Last Reviewed: 7/1/2016 2000-2018 [employer]. [address] [place] [address], [place] [address]. All rights reserved. This information is not intended as a substitute for professional medical care. Always follow your healthcare professional's instructions.

## 2019-11-01 NOTE — PROGRESS NOTES
Subjective     Elisabeth Voss is a 69 year old female who presents to clinic today for the following health issues:    HPI   Acute Illness   Acute illness concerns: cold symptoms   Onset: about a month        epigastic pain 1.5 weeks with terrible heartburn    Fever: no    Chills/Sweats: no    Headache (location?): no    Sinus Pressure:no    Conjunctivitis:  no    Ear Pain: no    Rhinorrhea: no    Congestion: no    Sore Throat: no     Cough: YES-productive of clear sputum, productive of yellow sputum    Wheeze: YES- a little     Decreased Appetite: YES    Nausea: no     Vomiting: no     Diarrhea:  no but stools are dark     Dysuria/Freq.: no     Fatigue/Achiness: YES    Sick/Strep Exposure: no      Therapies Tried and outcome: none     States she has been feeling ill about a month with onset of cough that is productive. She states yellow clear sputum. Denies sinus drainage or sore throat. She feels cough is worse when lying down. She denies fever. She is not a smoke.     She discontinued taking Zantac that she was taking for acid reflux discontinued this about a month ago. She states she has history of family esophageal cancer. She personal history of thyroid and melanoma.   She states acid reflux is worsened with dairy and meat.     Patient Active Problem List   Diagnosis     Advanced directives, counseling/discussion     Hyperlipidemia LDL goal <160     Malignant melanoma of skin of face (H)     Hypothyroidism     Meningioma (H)     Chondromalacia of patellofemoral joint, left     Tear of medial meniscus of knee, left, initial encounter     Cataracts, bilateral     Glaucoma suspect     Epiretinal membrane (ERM) of right eye     Myopia, bilateral     Astigmatism, bilateral     Pseudophakia of both eyes     History of melanoma     Past Surgical History:   Procedure Laterality Date     ARTHROSCOPY KNEE WITH MEDIAL MENISCECTOMY Left 9/20/2016    Procedure: ARTHROSCOPY KNEE WITH MEDIAL MENISCECTOMY;  Surgeon:  Maximilian Ochoa, DO;  Location: [facility]     BIOPSY NODE SENTINEL N/A 8/26/2014    Procedure: BIOPSY NODE SENTINEL;  Surgeon: Eliza Foss MD;  [facility]: [facility]     BLEPHAROPLASTY BILATERAL Bilateral 1/11/2018    Procedure: BLEPHAROPLASTY BILATERAL;  Bilateral upper eyelid blepharoplasty;  Surgeon: Jame Painter MD;  Location: [facility] OR     C TOTAL ABDOM HYSTERECTOMY  11/16/1993    TAHRSO, Rasheed urethropexy     CATARACT IOL, RT/LT Bilateral OD 12/22/16-OS 12/8/16     COLONOSCOPY  3/22/2013    Procedure: COLONOSCOPY;  colonoscopy;  Surgeon: Mike Lr MD;  Location: [facility] GI     HYSTERECTOMY, PAP NO LONGER INDICATED       ORTHOPEDIC SURGERY      shoulder surgery     PHACOEMULSIFICATION WITH STANDARD INTRAOCULAR LENS IMPLANT Left 12/8/2016    Procedure: PHACOEMULSIFICATION WITH STANDARD INTRAOCULAR LENS IMPLANT;  Surgeon: Jame Painter MD;  Location: [facility]     PHACOEMULSIFICATION WITH STANDARD INTRAOCULAR LENS IMPLANT Right 12/22/2016    Procedure: PHACOEMULSIFICATION WITH STANDARD INTRAOCULAR LENS IMPLANT;  Surgeon: Jame Painter MD;  Location: [facility]     RECONSTRUCT NOSE STAGE ONE N/A 9/5/2014    Procedure: RECONSTRUCT NOSE STAGE ONE;  Surgeon: Levy Holbrook MD;  [facility]     REVISE SCAR FACE N/A 8/26/2014    Procedure: REVISE SCAR FACE;  Surgeon: Eliza Foss MD;  [facility]     THYROIDECTOMY Left 8/26/2014    Procedure: THYROIDECTOMY;  Surgeon: Eliza Foss MD;  [facility]       Social History     Tobacco Use     Smoking status: Never Smoker     Smokeless tobacco: Never Used   Substance Use Topics     Alcohol use: Yes     Alcohol/week: 0.0 standard drinks     Comment: occasional     Family History   Problem Relation Age of Onset     Cancer Mother 45        lung cancer     Cerebrovascular Disease Father         bulge in aorta     Heart Disease Maternal Grandfather      Heart Disease Paternal Grandfather      Cancer Sister          esophageal      Cancer Paternal Grandmother      Cancer Brother 65        Thyroid     Other Cancer Sister         lung         Current Outpatient Medications   Medication Sig Dispense Refill     levothyroxine (SYNTHROID/LEVOTHROID) 50 MCG tablet Take 1 tablet (50 mcg) by mouth daily 90 tablet 3     NAPROXEN PO Take 660 mg by mouth 2 times daily as needed for moderate pain       pantoprazole (PROTONIX) 40 MG EC tablet Take 1 tablet (40 mg) by mouth daily 60 tablet 0     HEMP OIL OR EXTRACT OR OTHER CBD CANNABINOID, NOT MEDICAL CANNABIS, daily       RANITIDINE HCL PO Take 50 mg by mouth       simvastatin (ZOCOR) 20 MG tablet TAKE 1 TABLET BY MOUTH ONCE DAILY AT BEDTIME (Patient not taking: Reported on 11/1/2019) 90 tablet 3     Allergies   Allergen Reactions     No Known Drug Allergies      Recent Labs   Lab Test 07/16/19  0844 01/10/19  0749 07/24/18  0745 02/28/18  0742  04/28/17  0745   LDL  --  122*  --  103*  --  99   HDL  --  74  --  73  --  81   TRIG  --  124  --  95  --  92   ALT 26 37 24  --    < > 38   CR 0.95 1.03 1.03  --    < >  --    GFRESTIMATED 61 55* 53*  --    < >  --    GFRESTBLACK 70 64 64  --    < >  --    POTASSIUM 4.1 4.3 4.3  --    < >  --    TSH  --  2.57  --  1.85  --   --     < > = values in this interval not displayed.      BP Readings from Last 3 Encounters:   11/01/19 116/70   07/16/19 130/68   03/05/19 122/78    Wt Readings from Last 3 Encounters:   11/01/19 87.1 kg (192 lb)   07/16/19 87.7 kg (193 lb 4.8 oz)   03/05/19 87.1 kg (192 lb)                    Reviewed and updated as needed this visit by Provider         Review of Systems   ROS COMP: Constitutional, HEENT, cardiovascular, pulmonary, GI, , musculoskeletal, neuro, skin, endocrine and psych systems are negative, except as otherwise noted.      Objective    There were no vitals taken for this visit.  There is no height or weight on file to calculate BMI.  Physical Exam   GENERAL: healthy, alert and no distress  EYES: Eyes  "grossly normal to inspection, PERRL and conjunctivae and sclerae normal  HENT: ear canals and TM's normal, nose and mouth without ulcers or lesions  NECK: no adenopathy, no asymmetry, masses, or scars and thyroid normal to palpation  RESP: lungs clear to auscultation - no rales, rhonchi or wheezes  CV: regular rate and rhythm, normal S1 S2, no S3 or S4, no murmur, click or rub, no peripheral edema and peripheral pulses strong  ABDOMEN: tenderness epigastric, no organomegaly or masses, bowel sounds normal, no palpable or pulsatile masses, no bruits heard and no palpable renal abnormalities   MS: no gross musculoskeletal defects noted, no edema  SKIN: no suspicious lesions or rashes  BACK: no CVA tenderness, no paralumbar tenderness         Assessment & Plan     1. Gastroesophageal reflux disease with esophagitis  Discussed lifestyle changes and handout given will use PPI for 4-6 week is symptoms improving will continue if symptoms not improved recommend egd for further evaluation.   - pantoprazole (PROTONIX) 40 MG EC tablet; Take 1 tablet (40 mg) by mouth daily  Dispense: 60 tablet; Refill: 0  - CBC with platelets and differential    2. Dark stools  Will check for anemia   - CBC with platelets and differential     BMI:   Estimated body mass index is 31.95 kg/m  as calculated from the following:    Height as of 7/16/19: 1.651 m (5' 5\").    Weight as of this encounter: 87.1 kg (192 lb).   Weight management plan: Discussed healthy diet and exercise guidelines        Patient Instructions     Please take Protonix prior to first meal of the day take this for 6 weeks if symptoms not improving would recommend having and upper endoscopy completed.     Do not stop this medication abruptly as to prevent rebound acid production.    Thank you  Zahra Lara [facility]    Patient Education     Tips to Control Acid Reflux    To control acid reflux, you ll need to make some basic diet and lifestyle changes. The simple steps outlined below " may be all you ll need to ease discomfort.  Watch what you eat    Avoid fatty foods and spicy foods.    Eat fewer acidic foods, such as citrus and tomato-based foods. These can increase symptoms.    Limit drinking alcohol, caffeine, and fizzy beverages. All increase acid reflux.    Try limiting chocolate, peppermint, and spearmint. These can worsen acid reflux in some people.  Watch when you eat    Avoid lying down for 3 hours after eating.    Do not snack before going to bed.  Raise your head  Raising your head and upper body by 4 to 6 inches helps limit reflux when you re lying down. Put blocks under the head of your bed frame to raise it.  Other changes    Lose weight, if you need to    Don t exercise near bedtime    Avoid tight-fitting clothes    Limit aspirin and ibuprofen    Stop smoking   Date Last Reviewed: 7/1/2016 2000-2018 [employer]. [address] [place] [address], [place] [address]. All rights reserved. This information is not intended as a substitute for professional medical care. Always follow your healthcare professional's instructions.               No follow-ups on file.    CORAL De Anda [facility]

## 2019-11-11 NOTE — PROGRESS NOTES
"Subjective     Elisabeth Voss is a 69 year old female who presents to clinic today for the following health issues:    HPI   Concern - Varicose Vein changes   Onset: Sunday     Description:   Pt has had these varicose veins in the calf of the right leg for years without much pain or bother. Sunday they became itchy, painful and hot to the touch.   H/O varicose vein right posterior LE usually not painful and is now warm to touch. She has been elevating and wearing compression sock no change in symptoms. Started 3 days ago.     Current Outpatient Medications   Medication Sig Dispense Refill     levothyroxine (SYNTHROID/LEVOTHROID) 50 MCG tablet Take 1 tablet (50 mcg) by mouth daily 90 tablet 3     NAPROXEN PO Take 660 mg by mouth 2 times daily as needed for moderate pain       pantoprazole (PROTONIX) 40 MG EC tablet Take 1 tablet (40 mg) by mouth daily 60 tablet 0     simvastatin (ZOCOR) 20 MG tablet TAKE 1 TABLET BY MOUTH ONCE DAILY AT BEDTIME 90 tablet 3     HEMP OIL OR EXTRACT OR OTHER CBD CANNABINOID, NOT MEDICAL CANNABIS, daily       RANITIDINE HCL PO Take 50 mg by mouth       Allergies   Allergen Reactions     No Known Drug Allergies      BP Readings from Last 3 Encounters:   11/12/19 118/70   11/01/19 116/70   07/16/19 130/68    Wt Readings from Last 3 Encounters:   11/12/19 87.5 kg (193 lb)   11/01/19 87.1 kg (192 lb)   07/16/19 87.7 kg (193 lb 4.8 oz)        Reviewed and updated as needed this visit by Provider    Review of Systems   ROS COMP: Constitutional, HEENT, cardiovascular, pulmonary, GI, , musculoskeletal, neuro, skin, endocrine and psych systems are negative, except as otherwise noted.      Objective    /70   Pulse 70   Temp 99  F (37.2  C) (Temporal)   Resp 16   Ht 1.651 m (5' 5\")   Wt 87.5 kg (193 lb)   SpO2 97%   BMI 32.12 kg/m    Body mass index is 32.12 kg/m .  Physical Exam   GENERAL: healthy, alert and no distress  NECK: no adenopathy, no asymmetry, masses, or scars and " "thyroid normal to palpation  RESP: lungs clear to auscultation - no rales, rhonchi or wheezes  CV: regular rate and rhythm, normal S1 S2, no S3 or S4, no murmur, click or rub, no peripheral edema and peripheral pulses strong  ABDOMEN: soft, nontender, no hepatosplenomegaly, no masses and bowel sounds normal  MS: varicose veins right posterior lower extremity proximal to ankle warm, tender, erythema, firm.     Assessment & Plan     1. Varicose veins of leg with pain, right  Concern for DVT however area is superficial will schedule for us today to rule out clot. Continue to elevate. Will call with results. Home care instructions were reviewed with the patient. The risks, benefits and treatment options of prescribed medications or other treatments have been discussed with the patient. The patient verbalized their understanding and should call or follow up if no improvement or if they develop further problems.    - US Lower Extremity Venous Duplex Right; Future     BMI:   Estimated body mass index is 32.12 kg/m  as calculated from the following:    Height as of this encounter: 1.651 m (5' 5\").    Weight as of this encounter: 87.5 kg (193 lb).   Weight management plan: Patient was referred to their PCP to discuss a diet and exercise plan.        [facility]    "

## 2019-11-12 ENCOUNTER — OFFICE VISIT (OUTPATIENT)
Dept: FAMILY MEDICINE | Facility: OTHER | Age: 69
End: 2019-11-12
Payer: COMMERCIAL

## 2019-11-12 ENCOUNTER — HOSPITAL ENCOUNTER (OUTPATIENT)
Dept: ULTRASOUND IMAGING | Facility: CLINIC | Age: 69
Discharge: HOME | End: 2019-11-12
Attending: NURSE PRACTITIONER | Admitting: NURSE PRACTITIONER
Payer: COMMERCIAL

## 2019-11-12 VITALS
WEIGHT: 193 LBS | RESPIRATION RATE: 16 BRPM | HEIGHT: 65 IN | SYSTOLIC BLOOD PRESSURE: 118 MMHG | OXYGEN SATURATION: 97 % | DIASTOLIC BLOOD PRESSURE: 70 MMHG | BODY MASS INDEX: 32.15 KG/M2 | HEART RATE: 70 BPM | TEMPERATURE: 99 F

## 2019-11-12 DIAGNOSIS — I83.811 VARICOSE VEINS OF LEG WITH PAIN, RIGHT: Primary | ICD-10-CM

## 2019-11-12 DIAGNOSIS — I83.811 VARICOSE VEINS OF LEG WITH PAIN, RIGHT: ICD-10-CM

## 2019-11-12 PROCEDURE — 93971 EXTREMITY STUDY: CPT | Mod: RT

## 2019-11-12 PROCEDURE — 99213 OFFICE O/P EST LOW 20 MIN: CPT | Performed by: NURSE PRACTITIONER

## 2019-11-12 ASSESSMENT — MIFFLIN-ST. JEOR: SCORE: 1396.32

## 2019-11-13 NOTE — RESULT ENCOUNTER NOTE
I called and left message for Elisabeth on her VM to continue conservative treatment she will return to clinic if symptoms do not improve recommend referral to  Dr. Nelson for varicose veins if continues to be symptomatic.    Thank you  Zahra Lara CNP

## 2019-11-18 ENCOUNTER — OFFICE VISIT (OUTPATIENT)
Dept: SURGERY | Facility: CLINIC | Age: 69
End: 2019-11-18
Payer: COMMERCIAL

## 2019-11-18 VITALS
WEIGHT: 195 LBS | BODY MASS INDEX: 32.45 KG/M2 | DIASTOLIC BLOOD PRESSURE: 60 MMHG | SYSTOLIC BLOOD PRESSURE: 130 MMHG | OXYGEN SATURATION: 100 % | HEART RATE: 93 BPM

## 2019-11-18 DIAGNOSIS — I83.92 ASYMPTOMATIC VARICOSE VEINS OF LEFT LOWER EXTREMITY: ICD-10-CM

## 2019-11-18 DIAGNOSIS — I80.01 SUPERFICIAL PHLEBITIS AND THROMBOPHLEBITIS OF RIGHT LOWER EXTREMITY: Primary | ICD-10-CM

## 2019-11-18 PROCEDURE — 99203 OFFICE O/P NEW LOW 30 MIN: CPT | Performed by: SPECIALIST

## 2019-11-18 NOTE — PROGRESS NOTES
Consult requested by Dr. Gallagher    Reason for consultation - right leg varicose veins.    HPI:   Patient is a 69-year-old white female with a 50-year history of bilateral lower extremity varicose veins.  She reports them as being essentially asymptomatic up until about a week ago when she developed pain and redness in the vein in her right calf.  He does not recall any trauma to the area.  Denies any prior edema, DVT, superficial phlebitis or nonhealing wounds.  She reports her veins have been essentially been asymptomatic.  She recently started wearing compression socks.  She had an ultrasound that revealed a superficial phlebitis for which she is now referred.    Past Medical History:   Diagnosis Date     Cancer (H)     melanoma of cheek     History of blood transfusion     with hysterectomy many years ago     Lyme disease      Thyroid disease     partial thyroidectomy 8/2014     Past Surgical History:   Procedure Laterality Date     ARTHROSCOPY KNEE WITH MEDIAL MENISCECTOMY Left 9/20/2016    Procedure: ARTHROSCOPY KNEE WITH MEDIAL MENISCECTOMY;  Surgeon: Maximilian Ochoa DO;  [facility]: [facility]     BIOPSY NODE SENTINEL N/A 8/26/2014    Procedure: BIOPSY NODE SENTINEL;  Surgeon: Eliza Foss MD;  [facility]     BLEPHAROPLASTY BILATERAL Bilateral 1/11/2018    Procedure: BLEPHAROPLASTY BILATERAL;  Bilateral upper eyelid blepharoplasty;  Surgeon: Jame Painter MD;  Location: [facility]     C TOTAL ABDOM HYSTERECTOMY  11/16/1993    TAHRWili TAVAREZ urethropexy     CATARACT IOL, RT/LT Bilateral OD 12/22/16-OS 12/8/16     COLONOSCOPY  3/22/2013    Procedure: COLONOSCOPY;  colonoscopy;  Surgeon: Mike Lr MD;  Location: [facility]     HYSTERECTOMY, PAP NO LONGER INDICATED       ORTHOPEDIC SURGERY      shoulder surgery     PHACOEMULSIFICATION WITH STANDARD INTRAOCULAR LENS IMPLANT Left 12/8/2016    Procedure: PHACOEMULSIFICATION WITH STANDARD INTRAOCULAR LENS IMPLANT;  Surgeon: Madina  Jame Marvin MD;  Location: [facility]     PHACOEMULSIFICATION WITH STANDARD INTRAOCULAR LENS IMPLANT Right 12/22/2016    Procedure: PHACOEMULSIFICATION WITH STANDARD INTRAOCULAR LENS IMPLANT;  Surgeon: Jame Painter MD;  [facility]     RECONSTRUCT NOSE STAGE ONE N/A 9/5/2014    Procedure: RECONSTRUCT NOSE STAGE ONE;  Surgeon: Levy Holbrook MD;  [facility]     REVISE SCAR FACE N/A 8/26/2014    Procedure: REVISE SCAR FACE;  Surgeon: Eliza Foss MD;  [facility]     THYROIDECTOMY Left 8/26/2014    Procedure: THYROIDECTOMY;  Surgeon: Eliza Foss MD;  [facility]     Current Outpatient Medications   Medication     HEMP OIL OR EXTRACT OR OTHER CBD CANNABINOID, NOT MEDICAL CANNABIS,     levothyroxine (SYNTHROID/LEVOTHROID) 50 MCG tablet     NAPROXEN PO     pantoprazole (PROTONIX) 40 MG EC tablet     RANITIDINE HCL PO     simvastatin (ZOCOR) 20 MG tablet     No current facility-administered medications for this visit.         Allergies   Allergen Reactions     No Known Drug Allergies      Social History     Socioeconomic History     Marital status:      Spouse name: Not on file     Number of children: Not on file     Years of education: Not on file     Highest education level: Not on file   Occupational History     Not on file   Social Needs     Financial resource strain: Not on file     Food insecurity:     Worry: Not on file     Inability: Not on file     Transportation needs:     Medical: Not on file     Non-medical: Not on file   Tobacco Use     Smoking status: Never Smoker     Smokeless tobacco: Never Used   Substance and Sexual Activity     Alcohol use: Yes     Alcohol/week: 0.0 standard drinks     Comment: occasional     Drug use: No     Sexual activity: Yes     Partners: Male     Birth control/protection: Surgical   Lifestyle     Physical activity:     Days per week: Not on file     Minutes per session: Not on file     Stress: Not on file   Relationships      Social connections:     Talks on phone: Not on file     Gets together: Not on file     Attends Mor[employer] service: Not on file     Active member of club or organization: Not on file     Attends meetings of clubs or organizations: Not on file     Relationship status: Not on file     Intimate partner violence:     Fear of current or ex partner: Not on file     Emotionally abused: Not on file     Physically abused: Not on file     Forced sexual activity: Not on file   Other Topics Concern     Parent/sibling w/ CABG, MI or angioplasty before 65F 55M? Not Asked   Social History Narrative     Not on file     Family History   Problem Relation Age of Onset     Cancer Mother 45        lung cancer     Cerebrovascular Disease Father         bulge in aorta     Heart Disease Maternal Grandfather      Heart Disease Paternal Grandfather      Cancer Sister         esophageal      Cancer Paternal Grandmother      Cancer Brother 65        Thyroid     Other Cancer Sister         lung      ROS: 10 point ROS neg other than the symptoms noted above in the HPI.    PE:  B/P: 130/60, T: Data Unavailable, P: 93, R: Data Unavailable  General: well developed, well nourished WF who appears her stated age  HEENT: NC/AT, EOMI, (-)icterus, (-)injection  Neck: Supple, No JVD  Chest: CTA  Heart: S1, S2, (-)m/r/g  Abd: Soft, non tender, non distended, non tender, no masses  Ext; Warm, no edema, (+)DP/PT pulses.  Bilateral varicose veins R>>L.  Thrombosed varicosity RLE.  Tender.  Psych: AAOx3  Neuro: No focal deficits      US -   US LOWER EXTREMITY VENOUS DUPLEX RIGHT   11/12/2019 2:10 PM      HISTORY: Right lower extremity posterior, varicose veins of leg with  pain, right.     COMPARISON: Venous Doppler ultrasound of the left lower extremity  dated 6/7/2016.     FINDINGS: Gray-scale, color and Doppler spectral analysis ultrasound  was performed of the right lower extremity. Compression and  augmentation imaging was performed.     The deep venous  system of the right lower extremity is fully  compressible. Spectral Doppler demonstrates normal phasicity and  excellent augmentation with calf compression. Visualized greater  saphenous and deep calf veins are patent.     At the site of the patient's area of pain in the anterior mid lower  leg, there is a completely occluded thrombosed varicose vein. Other  varicose veins are also noted in the lower leg and these demonstrate  significant reversal (more than four seconds long) of blood flow with  Valsalva.                                                                      IMPRESSION:  1. No evidence for deep venous thrombosis within the right lower  extremity.   2. Superficial thrombophlebitis of a varicose vein in the right lower  leg corresponds with the area of symptoms.  3. Incompetent veins with significant reversal of blood flow (with  Valsalva) noted in the varicose veins.     Findings of superficial thrombophlebitis and lack of deep venous  thrombosis were called to Zahra Lara's nurse on 11/12/2019 at 2:35  PM by the ultrasound technologist.     NOE WYLIE MD    Impression/plan:  This is a 69-year-old lady presenting with a many year history of bilateral lower extremity asymptomatic varicose veins.  She recently developed a superficial phlebitis of her right lower extremity.  She is a CEAP 2 bilaterally.  The plan at this time is to have her apply warm or cold soaks and start a nonsteroidal.  She will be reimaged this week to ensure the clot has not progressed to the deep system.  I did explain to her that these usually self-limiting and resolve within 6 to 8 weeks.  I will call her with the ultrasound result once it is completed.  She will follow-up with me in 6 to 8 weeks should it be necessary.  She also knows she can follow-up with me should her veins become symptomatic.    Martin Nelson MD, FACS

## 2019-11-18 NOTE — LETTER
11/18/2019         RE: Elisabeth Voss  [address] [facility] [address]        Dear Colleague,    Thank you for referring your patient, Elisabeth Voss, to the [facility]. Please see a copy of my visit note below.    Consult requested by Dr. Gallagher    Reason for consultation - right leg varicose veins.    HPI:   Patient is a 69-year-old white female with a 50-year history of bilateral lower extremity varicose veins.  She reports them as being essentially asymptomatic up until about a week ago when she developed pain and redness in the vein in her right calf.  He does not recall any trauma to the area.  Denies any prior edema, DVT, superficial phlebitis or nonhealing wounds.  She reports her veins have been essentially been asymptomatic.  She recently started wearing compression socks.  She had an ultrasound that revealed a superficial phlebitis for which she is now referred.    Past Medical History:   Diagnosis Date     Cancer (H)     melanoma of cheek     History of blood transfusion     with hysterectomy many years ago     Lyme disease      Thyroid disease     partial thyroidectomy 8/2014     Past Surgical History:   Procedure Laterality Date     ARTHROSCOPY KNEE WITH MEDIAL MENISCECTOMY Left 9/20/2016    Procedure: ARTHROSCOPY KNEE WITH MEDIAL MENISCECTOMY;  Surgeon: Maximilian Ochoa DO;  Location:  OR     BIOPSY NODE SENTINEL N/A 8/26/2014    Procedure: BIOPSY NODE SENTINEL;  Surgeon: Eliza Foss MD;  [facility]     BLEPHAROPLASTY BILATERAL Bilateral 1/11/2018    Procedure: BLEPHAROPLASTY BILATERAL;  Bilateral upper eyelid blepharoplasty;  Surgeon: Jame Painter MD;  [facility]     C TOTAL ABDOM HYSTERECTOMY  11/16/1993    TAHRWili TAVAREZ urethropexy     CATARACT IOL, RT/LT Bilateral OD 12/22/16-OS 12/8/16     COLONOSCOPY  3/22/2013    Procedure: COLONOSCOPY;  colonoscopy;  Surgeon: Mike Lr MD;  Location:  GI      HYSTERECTOMY, PAP NO LONGER INDICATED       ORTHOPEDIC SURGERY      shoulder surgery     PHACOEMULSIFICATION WITH STANDARD INTRAOCULAR LENS IMPLANT Left 12/8/2016    Procedure: PHACOEMULSIFICATION WITH STANDARD INTRAOCULAR LENS IMPLANT;  Surgeon: Jame Painter MD;  [facility]     PHACOEMULSIFICATION WITH STANDARD INTRAOCULAR LENS IMPLANT Right 12/22/2016    Procedure: PHACOEMULSIFICATION WITH STANDARD INTRAOCULAR LENS IMPLANT;  Surgeon: Jame Painter MD;  [facility]     RECONSTRUCT NOSE STAGE ONE N/A 9/5/2014    Procedure: RECONSTRUCT NOSE STAGE ONE;  Surgeon: Levy Holbrook MD;  [facility]     REVISE SCAR FACE N/A 8/26/2014    Procedure: REVISE SCAR FACE;  Surgeon: Eliza Foss MD;  [facility]     THYROIDECTOMY Left 8/26/2014    Procedure: THYROIDECTOMY;  Surgeon: Eliza Foss MD;  [facility]     Current Outpatient Medications   Medication     HEMP OIL OR EXTRACT OR OTHER CBD CANNABINOID, NOT MEDICAL CANNABIS,     levothyroxine (SYNTHROID/LEVOTHROID) 50 MCG tablet     NAPROXEN PO     pantoprazole (PROTONIX) 40 MG EC tablet     RANITIDINE HCL PO     simvastatin (ZOCOR) 20 MG tablet     No current facility-administered medications for this visit.         Allergies   Allergen Reactions     No Known Drug Allergies      Social History     Socioeconomic History     Marital status:      Spouse name: Not on file     Number of children: Not on file     Years of education: Not on file     Highest education level: Not on file   Occupational History     Not on file   Social Needs     Financial resource strain: Not on file     Food insecurity:     Worry: Not on file     Inability: Not on file     Transportation needs:     Medical: Not on file     Non-medical: Not on file   Tobacco Use     Smoking status: Never Smoker     Smokeless tobacco: Never Used   Substance and Sexual Activity     Alcohol use: Yes     Alcohol/week: 0.0 standard drinks     Comment:  occasional     Drug use: No     Sexual activity: Yes     Partners: Male     Birth control/protection: Surgical   Lifestyle     Physical activity:     Days per week: Not on file     Minutes per session: Not on file     Stress: Not on file   Relationships     Social connections:     Talks on phone: Not on file     Gets together: Not on file     Attends Voodoo service: Not on file     Active member of club or organization: Not on file     Attends meetings of clubs or organizations: Not on file     Relationship status: Not on file     Intimate partner violence:     Fear of current or ex partner: Not on file     Emotionally abused: Not on file     Physically abused: Not on file     Forced sexual activity: Not on file   Other Topics Concern     Parent/sibling w/ CABG, MI or angioplasty before 65F 55M? Not Asked   Social History Narrative     Not on file     Family History   Problem Relation Age of Onset     Cancer Mother 45        lung cancer     Cerebrovascular Disease Father         bulge in aorta     Heart Disease Maternal Grandfather      Heart Disease Paternal Grandfather      Cancer Sister         esophageal      Cancer Paternal Grandmother      Cancer Brother 65        Thyroid     Other Cancer Sister         lung      ROS: 10 point ROS neg other than the symptoms noted above in the HPI.    PE:  B/P: 130/60, T: Data Unavailable, P: 93, R: Data Unavailable  General: well developed, well nourished WF who appears her stated age  HEENT: NC/AT, EOMI, (-)icterus, (-)injection  Neck: Supple, No JVD  Chest: CTA  Heart: S1, S2, (-)m/r/g  Abd: Soft, non tender, non distended, non tender, no masses  Ext; Warm, no edema, (+)DP/PT pulses.  Bilateral varicose veins R>>L.  Thrombosed varicosity RLE.  Tender.  Psych: AAOx3  Neuro: No focal deficits      US -   US LOWER EXTREMITY VENOUS DUPLEX RIGHT   11/12/2019 2:10 PM      HISTORY: Right lower extremity posterior, varicose veins of leg with  pain, right.     COMPARISON: Venous  Doppler ultrasound of the left lower extremity  dated 6/7/2016.     FINDINGS: Gray-scale, color and Doppler spectral analysis ultrasound  was performed of the right lower extremity. Compression and  augmentation imaging was performed.     The deep venous system of the right lower extremity is fully  compressible. Spectral Doppler demonstrates normal phasicity and  excellent augmentation with calf compression. Visualized greater  saphenous and deep calf veins are patent.     At the site of the patient's area of pain in the anterior mid lower  leg, there is a completely occluded thrombosed varicose vein. Other  varicose veins are also noted in the lower leg and these demonstrate  significant reversal (more than four seconds long) of blood flow with  Valsalva.                                                                      IMPRESSION:  1. No evidence for deep venous thrombosis within the right lower  extremity.   2. Superficial thrombophlebitis of a varicose vein in the right lower  leg corresponds with the area of symptoms.  3. Incompetent veins with significant reversal of blood flow (with  Valsalva) noted in the varicose veins.     Findings of superficial thrombophlebitis and lack of deep venous  thrombosis were called to Zahra Lara's nurse on 11/12/2019 at 2:35  PM by the ultrasound technologist.     NOE WYLIE MD    Impression/plan:  This is a 69-year-old lady presenting with a many year history of bilateral lower extremity asymptomatic varicose veins.  She recently developed a superficial phlebitis of her right lower extremity.  She is a CEAP 2 bilaterally.  The plan at this time is to have her apply warm or cold soaks and start a nonsteroidal.  She will be reimaged this week to ensure the clot has not progressed to the deep system.  I did explain to her that these usually self-limiting and resolve within 6 to 8 weeks.  I will call her with the ultrasound result once it is completed.  She will  follow-up with me in 6 to 8 weeks should it be necessary.  She also knows she can follow-up with me should her veins become symptomatic.    Martin Nelson MD, FACS    Again, thank you for allowing me to participate in the care of your patient.        Sincerely,        Martin Nelson MD

## 2019-11-19 ENCOUNTER — HOSPITAL ENCOUNTER (OUTPATIENT)
Dept: ULTRASOUND IMAGING | Facility: CLINIC | Age: 69
Discharge: HOME | End: 2019-11-19
Attending: SPECIALIST | Admitting: SPECIALIST
Payer: COMMERCIAL

## 2019-11-19 DIAGNOSIS — I80.01 SUPERFICIAL PHLEBITIS AND THROMBOPHLEBITIS OF RIGHT LOWER EXTREMITY: ICD-10-CM

## 2019-11-19 PROCEDURE — 93971 EXTREMITY STUDY: CPT | Mod: RT

## 2019-11-21 ENCOUNTER — TELEPHONE (OUTPATIENT)
Dept: SURGERY | Facility: CLINIC | Age: 69
End: 2019-11-21

## 2019-11-21 NOTE — TELEPHONE ENCOUNTER
Dr. Nelson to call patient with US results...............Gabrielle Torres CMA  ([facility])

## 2019-12-17 ENCOUNTER — TRANSFERRED RECORDS (OUTPATIENT)
Dept: HEALTH INFORMATION MANAGEMENT | Facility: CLINIC | Age: 69
End: 2019-12-17

## 2019-12-17 NOTE — PATIENT INSTRUCTIONS
Follow-up in 3 weeks to repeat weightbearing radiographs.   LVM informing Pt to call back for x-ray results.

## 2020-01-05 DIAGNOSIS — K21.00 GASTROESOPHAGEAL REFLUX DISEASE WITH ESOPHAGITIS: ICD-10-CM

## 2020-01-06 RX ORDER — PANTOPRAZOLE SODIUM 40 MG/1
TABLET, DELAYED RELEASE ORAL
Qty: 90 TABLET | Refills: 3 | Status: SHIPPED | OUTPATIENT
Start: 2020-01-06 | End: 2021-01-04

## 2020-03-12 DIAGNOSIS — E03.8 OTHER SPECIFIED HYPOTHYROIDISM: ICD-10-CM

## 2020-03-12 DIAGNOSIS — E78.5 HYPERLIPIDEMIA LDL GOAL <160: ICD-10-CM

## 2020-03-16 DIAGNOSIS — E78.5 HYPERLIPIDEMIA LDL GOAL <160: ICD-10-CM

## 2020-03-16 DIAGNOSIS — E03.8 OTHER SPECIFIED HYPOTHYROIDISM: ICD-10-CM

## 2020-03-16 RX ORDER — SIMVASTATIN 20 MG
TABLET ORAL
Qty: 90 TABLET | Refills: 0 | Status: SHIPPED | OUTPATIENT
Start: 2020-03-16 | End: 2020-06-10

## 2020-03-16 RX ORDER — LEVOTHYROXINE SODIUM 50 UG/1
TABLET ORAL
Qty: 90 TABLET | Refills: 0 | Status: SHIPPED | OUTPATIENT
Start: 2020-03-16 | End: 2020-06-10

## 2020-03-16 NOTE — TELEPHONE ENCOUNTER
"simvastatin  Last Written Prescription Date:  3/5/2019  Last Fill Quantity: 90,  # refills: 3   Last office visit: 3/5/2019 with prescribing provider:      Future Office Visit:      Requested Prescriptions   Pending Prescriptions Disp Refills     simvastatin (ZOCOR) 20 MG tablet [Pharmacy Med Name: Simvastatin 20 MG Oral Tablet] 90 tablet 0     Sig: TAKE 1 TABLET BY MOUTH ONCE DAILY AT BEDTIME       Statins Protocol Failed - 3/12/2020  8:22 PM        Failed - LDL on file in past 12 months     Recent Labs   Lab Test 01/10/19  0749   *             Failed - Recent (12 mo) or future (30 days) visit within the authorizing provider's specialty     Patient has had an office visit with the authorizing provider or a provider within the authorizing providers department within the previous 12 mos or has a future within next 30 days. See \"Patient Info\" tab in inbasket, or \"Choose Columns\" in Meds & Orders section of the refill encounter.              Passed - No abnormal creatine kinase in past 12 months     No lab results found.             Passed - Medication is active on med list        Passed - Patient is age 18 or older        Passed - No active pregnancy on record        Passed - No positive pregnancy test in past 12 months          Levothyroxine  Last Written Prescription Date:  3/5/2019  Last Fill Quantity: 90,  # refills: 3   Last office visit: 3/5/2019 with prescribing provider:      Future Office Visit:         levothyroxine (SYNTHROID/LEVOTHROID) 50 MCG tablet [Pharmacy Med Name: Levothyroxine Sodium 50 MCG Oral Tablet] 90 tablet 0     Sig: Take 1 tablet by mouth once daily       Thyroid Protocol Failed - 3/12/2020  8:22 PM        Failed - Recent (12 mo) or future (30 days) visit within the authorizing provider's specialty     Patient has had an office visit with the authorizing provider or a provider within the authorizing providers department within the previous 12 mos or has a future within next 30 days. " "See \"Patient Info\" tab in inbasket, or \"Choose Columns\" in Meds & Orders section of the refill encounter.              Failed - Normal TSH on file in past 12 months     Recent Labs   Lab Test 01/10/19  0749   TSH 2.57              Passed - Patient is 12 years or older        Passed - Medication is active on med list        Passed - No active pregnancy on record     If patient is pregnant or has had a positive pregnancy test, please check TSH.          Passed - No positive pregnancy test in past 12 months     If patient is pregnant or has had a positive pregnancy test, please check TSH.               Jane Gaona RN on 3/16/2020 at 11:12 AM    "

## 2020-03-18 RX ORDER — LEVOTHYROXINE SODIUM 50 UG/1
TABLET ORAL
Qty: 90 TABLET | Refills: 0 | OUTPATIENT
Start: 2020-03-18

## 2020-03-18 RX ORDER — SIMVASTATIN 20 MG
TABLET ORAL
Qty: 90 TABLET | Refills: 0 | OUTPATIENT
Start: 2020-03-18

## 2020-03-18 NOTE — TELEPHONE ENCOUNTER
Prescription was sent 3/16/20 for #90 with 0 refills.  Pharmacy notified via E-Prescribe refusal.     Rita Barker RN on 3/18/2020 at 9:22 AM

## 2020-03-18 NOTE — TELEPHONE ENCOUNTER
"Requested Prescriptions   Pending Prescriptions Disp Refills     simvastatin (ZOCOR) 20 MG tablet [Pharmacy Med Name: Simvastatin 20 MG Oral Tablet] 90 tablet 0     Sig: TAKE 1 TABLET BY MOUTH ONCE DAILY AT BEDTIME   Last Written Prescription Date:  3/16/20  Last Fill Quantity: 90,  # refills: 0   Last office visit: 3/5/2019 with prescribing provider:  Elliott   Future Office Visit:        Statins Protocol Failed - 3/16/2020  5:08 PM        Failed - LDL on file in past 12 months     Recent Labs   Lab Test 01/10/19  0749   *           Failed - Recent (12 mo) or future (30 days) visit within the authorizing provider's specialty     Patient has had an office visit with the authorizing provider or a provider within the authorizing providers department within the previous 12 mos or has a future within next 30 days. See \"Patient Info\" tab in inbasket, or \"Choose Columns\" in Meds & Orders section of the refill encounter.              Passed - No abnormal creatine kinase in past 12 months     No lab results found.             Passed - Medication is active on med list        Passed - Patient is age 18 or older        Passed - No active pregnancy on record        Passed - No positive pregnancy test in past 12 months           levothyroxine (SYNTHROID/LEVOTHROID) 50 MCG tablet [Pharmacy Med Name: Levothyroxine Sodium 50 MCG Oral Tablet] 90 tablet 0     Sig: Take 1 tablet by mouth once daily   Last Written Prescription Date:  3/16/20  Last Fill Quantity: 90,  # refills: 0   Last office visit: 3/5/2019 with prescribing provider:  Elliott   Future Office Visit:        Thyroid Protocol Failed - 3/16/2020  5:08 PM        Failed - Recent (12 mo) or future (30 days) visit within the authorizing provider's specialty     Patient has had an office visit with the authorizing provider or a provider within the authorizing providers department within the previous 12 mos or has a future within next 30 days. See \"Patient Info\" tab in " "inbasket, or \"Choose Columns\" in Meds & Orders section of the refill encounter.              Failed - Normal TSH on file in past 12 months     Recent Labs   Lab Test 01/10/19  0749   TSH 2.57            Passed - Patient is 12 years or older        Passed - Medication is active on med list        Passed - No active pregnancy on record     If patient is pregnant or has had a positive pregnancy test, please check TSH.          Passed - No positive pregnancy test in past 12 months     If patient is pregnant or has had a positive pregnancy test, please check TSH.               "

## 2020-06-05 DIAGNOSIS — E03.8 OTHER SPECIFIED HYPOTHYROIDISM: ICD-10-CM

## 2020-06-05 DIAGNOSIS — E78.5 HYPERLIPIDEMIA LDL GOAL <160: ICD-10-CM

## 2020-06-10 RX ORDER — LEVOTHYROXINE SODIUM 50 UG/1
TABLET ORAL
Qty: 30 TABLET | Refills: 0 | Status: SHIPPED | OUTPATIENT
Start: 2020-06-10 | End: 2020-06-12

## 2020-06-10 RX ORDER — SIMVASTATIN 20 MG
TABLET ORAL
Qty: 30 TABLET | Refills: 0 | Status: SHIPPED | OUTPATIENT
Start: 2020-06-10 | End: 2020-06-12

## 2020-06-10 NOTE — TELEPHONE ENCOUNTER
"Requested Prescriptions   Pending Prescriptions Disp Refills     simvastatin (ZOCOR) 20 MG tablet [Pharmacy Med Name: Simvastatin 20 MG Oral Tablet] 90 tablet 0     Sig: TAKE 1 TABLET BY MOUTH ONCE DAILY AT BEDTIME   Last Written Prescription Date:  3/16/2020  Last Fill Quantity: 90,  # refills: 0   Last office visit: 3/5/2019 with prescribing provider:  Future Office Visit:   Next 5 appointments (look out 90 days)    Jul 23, 2020  8:00 AM CDT  Return Visit with Dorothy Castellon MD  [facility] ([address] [id number] [phone]           Statins Protocol Failed - 6/5/2020  5:27 PM        Failed - LDL on file in past 12 months     Recent Labs   Lab Test 01/10/19  0749   *           Failed - Recent (12 mo) or future (30 days) visit within the authorizing provider's specialty     Patient has had an office visit with the authorizing provider or a provider within the authorizing providers department within the previous 12 mos or has a future within next 30 days. See \"Patient Info\" tab in inbasket, or \"Choose Columns\" in Meds & Orders section of the refill encounter.            Passed - No abnormal creatine kinase in past 12 months     No lab results found.             Passed - Medication is active on med list        Passed - Patient is age 18 or older        Passed - No active pregnancy on record        Passed - No positive pregnancy test in past 12 months           levothyroxine (SYNTHROID/LEVOTHROID) 50 MCG tablet [Pharmacy Med Name: Levothyroxine Sodium 50 MCG Oral Tablet] 90 tablet 0     Sig: Take 1 tablet by mouth once daily   Last Written Prescription Date:  3/16/2020  Last Fill Quantity: 90,  # refills: 0   Last office visit: 3/5/2019 with prescribing provider:     Future Office Visit:   Next 5 appointments (look out 90 days)    Jul 23, 2020  8:00 AM CDT  Return Visit with Dorothy Castellon MD  [facility] ([facility]" "[address] [id number] [phone]             Thyroid Protocol Failed - 6/5/2020  5:27 PM        Failed - Recent (12 mo) or future (30 days) visit within the authorizing provider's specialty     Patient has had an office visit with the authorizing provider or a provider within the authorizing providers department within the previous 12 mos or has a future within next 30 days. See \"Patient Info\" tab in inbasket, or \"Choose Columns\" in Meds & Orders section of the refill encounter.            Failed - Normal TSH on file in past 12 months     Recent Labs   Lab Test 01/10/19  0749   TSH 2.57            Passed - Patient is 12 years or older        Passed - Medication is active on med list        Passed - No active pregnancy on record     If patient is pregnant or has had a positive pregnancy test, please check TSH.          Passed - No positive pregnancy test in past 12 months     If patient is pregnant or has had a positive pregnancy test, please check TSH.           Routing refill request to provider for review/approval  Susana Jeffrey RN      "

## 2020-06-10 NOTE — TELEPHONE ENCOUNTER
Called Patient, Relayed Message. Appointment scheduled for Friday with Elliott. Has enough  medications to last until then.

## 2020-06-10 NOTE — TELEPHONE ENCOUNTER
simvastatin (ZOCOR) 20 MG tablet [Pharmacy Med Name: Simvastatin 20 MG Oral Tablet] 90 tablet 0    Sig: TAKE 1 TABLET BY MOUTH ONCE DAILY AT BEDTIME   Routing refill request to provider for review/approval because:  Labs not current:  LDL  Patient needs to be seen because it has been more than 1 year since last office visit.  T'd up 1 month for provider review.      levothyroxine (SYNTHROID/LEVOTHROID) 50 MCG tablet [Pharmacy Med Name: Levothyroxine Sodium 50 MCG Oral Tablet] 90 tablet 0    Sig: Take 1 tablet by mouth once daily   Routing refill request to provider for review/approval because:  Labs not current:  TSH  Patient needs to be seen because it has been more than 1 year since last office visit.  T'd up 1 month for provider review.      Office visit due  Susana Jeffrey RN

## 2020-06-12 ENCOUNTER — VIRTUAL VISIT (OUTPATIENT)
Dept: INTERNAL MEDICINE | Facility: CLINIC | Age: 70
End: 2020-06-12
Payer: COMMERCIAL

## 2020-06-12 VITALS
HEART RATE: 67 BPM | SYSTOLIC BLOOD PRESSURE: 121 MMHG | TEMPERATURE: 96.2 F | BODY MASS INDEX: 31.95 KG/M2 | DIASTOLIC BLOOD PRESSURE: 82 MMHG | WEIGHT: 192 LBS

## 2020-06-12 DIAGNOSIS — E78.5 HYPERLIPIDEMIA LDL GOAL <160: ICD-10-CM

## 2020-06-12 DIAGNOSIS — E03.8 OTHER SPECIFIED HYPOTHYROIDISM: ICD-10-CM

## 2020-06-12 PROCEDURE — 99214 OFFICE O/P EST MOD 30 MIN: CPT | Mod: 95 | Performed by: INTERNAL MEDICINE

## 2020-06-12 RX ORDER — MULTIVITAMIN
2 TABLET ORAL DAILY
COMMUNITY
End: 2021-04-13

## 2020-06-12 RX ORDER — ANTIARTHRITIC COMBINATION NO.2 900 MG
TABLET ORAL DAILY
COMMUNITY
End: 2021-04-13

## 2020-06-12 RX ORDER — LEVOTHYROXINE SODIUM 50 UG/1
50 TABLET ORAL DAILY
Qty: 90 TABLET | Refills: 3 | Status: SHIPPED | OUTPATIENT
Start: 2020-06-12 | End: 2021-07-05

## 2020-06-12 RX ORDER — SIMVASTATIN 20 MG
TABLET ORAL
Qty: 90 TABLET | Refills: 3 | Status: SHIPPED | OUTPATIENT
Start: 2020-06-12 | End: 2021-07-05

## 2020-06-12 RX ORDER — MULTIVIT WITH MINERALS/LUTEIN
250 TABLET ORAL DAILY
COMMUNITY
End: 2021-04-13

## 2020-06-12 NOTE — PROGRESS NOTES
"Elisabeth Voss is a 69 year old female who is being evaluated via a billable telephone visit.      The patient has been notified of following:     \"This telephone visit will be conducted via a call between you and your physician/provider. We have found that certain health care needs can be provided without the need for a physical exam.  This service lets us provide the care you need with a short phone conversation.  If a prescription is necessary we can send it directly to your pharmacy.  If lab work is needed we can place an order for that and you can then stop by our lab to have the test done at a later time.    Telephone visits are billed at different rates depending on your insurance coverage. During this emergency period, for some insurers they may be billed the same as an in-person visit.  Please reach out to your insurance provider with any questions.    If during the course of the call the physician/provider feels a telephone visit is not appropriate, you will not be charged for this service.\"    Patient has given verbal consent for Telephone visit?  Yes    What phone number would you like to be contacted at? [phone]    How would you like to obtain your AVS? MyChart    Subjective     Elisabeth Voss is a 69 year old female who presents via phone visit today for the following health issues:    HPI  Chief Complaint   Patient presents with     Telephone     medication recheck     She is doing ok, staying safe.      No chest pains, no sob.     Due for labs now but will wait.                       Patient Active Problem List   Diagnosis     Advanced directives, counseling/discussion     Hyperlipidemia LDL goal <160     Malignant melanoma of skin of face (H)     Hypothyroidism     Meningioma (H)     Chondromalacia of patellofemoral joint, left     Tear of medial meniscus of knee, left, initial encounter     Cataracts, bilateral     Glaucoma suspect     Epiretinal membrane (ERM) of right eye     " Myopia, bilateral     Astigmatism, bilateral     Pseudophakia of both eyes     History of melanoma     Gastroesophageal reflux disease with esophagitis     Varicose veins of leg with pain, right     Past Surgical History:   Procedure Laterality Date     ARTHROSCOPY KNEE WITH MEDIAL MENISCECTOMY Left 9/20/2016    Procedure: ARTHROSCOPY KNEE WITH MEDIAL MENISCECTOMY;  Surgeon: Maximilian Ochoa DO;  Location: [facility]     BIOPSY NODE SENTINEL N/A 8/26/2014    Procedure: BIOPSY NODE SENTINEL;  Surgeon: Eliza Foss MD;  [facility]: [facility]     BLEPHAROPLASTY BILATERAL Bilateral 1/11/2018    Procedure: BLEPHAROPLASTY BILATERAL;  Bilateral upper eyelid blepharoplasty;  Surgeon: Jmae Painter MD;  Location: [facility] OR     C TOTAL ABDOM HYSTERECTOMY  11/16/1993    TAHRSO, Rasheed urethropexy     CATARACT IOL, RT/LT Bilateral OD 12/22/16-OS 12/8/16     COLONOSCOPY  3/22/2013    Procedure: COLONOSCOPY;  colonoscopy;  Surgeon: Mike Lr MD;  Location: [facility] GI     HYSTERECTOMY, PAP NO LONGER INDICATED       ORTHOPEDIC SURGERY      shoulder surgery     PHACOEMULSIFICATION WITH STANDARD INTRAOCULAR LENS IMPLANT Left 12/8/2016    Procedure: PHACOEMULSIFICATION WITH STANDARD INTRAOCULAR LENS IMPLANT;  Surgeon: Jame Painter MD;  Location: [facility]     PHACOEMULSIFICATION WITH STANDARD INTRAOCULAR LENS IMPLANT Right 12/22/2016    Procedure: PHACOEMULSIFICATION WITH STANDARD INTRAOCULAR LENS IMPLANT;  Surgeon: Jame Painter MD;  Location: [facility]     RECONSTRUCT NOSE STAGE ONE N/A 9/5/2014    Procedure: RECONSTRUCT NOSE STAGE ONE;  Surgeon: Levy Holbrook MD;  [facility]     REVISE SCAR FACE N/A 8/26/2014    Procedure: REVISE SCAR FACE;  Surgeon: Eliza Foss MD;  [facility]     THYROIDECTOMY Left 8/26/2014    Procedure: THYROIDECTOMY;  Surgeon: Eliza Foss MD;  Location: [facility]       Social History     Tobacco Use     Smoking status: Never Smoker     Smokeless  tobacco: Never Used   Substance Use Topics     Alcohol use: Yes     Alcohol/week: 0.0 standard drinks     Comment: occasional     Family History   Problem Relation Age of Onset     Cancer Mother 45        lung cancer     Cerebrovascular Disease Father         bulge in aorta     Heart Disease Maternal Grandfather      Heart Disease Paternal Grandfather      Cancer Sister         esophageal      Cancer Paternal Grandmother      Cancer Brother 65        Thyroid     Other Cancer Sister         lung         Current Outpatient Medications   Medication Sig Dispense Refill     Biotin 5000 MCG TABS Take by mouth daily       levothyroxine (SYNTHROID/LEVOTHROID) 50 MCG tablet Take 1 tablet by mouth once daily 30 tablet 0     Multiple vitamin TABS Take 2 tablets by mouth daily       NAPROXEN PO Take 660 mg by mouth 2 times daily as needed for moderate pain       pantoprazole (PROTONIX) 40 MG EC tablet TAKE 1 TABLET BY MOUTH ONCE DAILY 90 tablet 3     simvastatin (ZOCOR) 20 MG tablet TAKE 1 TABLET BY MOUTH ONCE DAILY AT BEDTIME 30 tablet 0     vitamin C (ASCORBIC ACID) 250 MG tablet Take 250 mg by mouth daily       Allergies   Allergen Reactions     No Known Drug Allergies        Reviewed and updated as needed this visit by Provider         Review of Systems   Constitutional, HEENT, cardiovascular, pulmonary, gi and gu systems are negative, except as otherwise noted.       Objective   Reported vitals:  BP (!) 121/92   Pulse 67   Temp 96.2  F (35.7  C)   Wt 87.1 kg (192 lb)   BMI 31.95 kg/m     healthy, alert and no distress  PSYCH: Alert and oriented times 3; coherent speech, normal   rate and volume, able to articulate logical thoughts, able   to abstract reason, no tangential thoughts, no hallucinations   or delusions  Her affect is normal  RESP: No cough, no audible wheezing, able to talk in full sentences  Remainder of exam unable to be completed due to telephone visits    Diagnostic Test Results:  Labs reviewed in  Epic        Assessment/Plan:  1. Other specified hypothyroidism  Patient is doing well she thinks her thyroid is appropriate.  She is not had a TSH for a year and a half but she does not want to come in with the exposures at this time with the coronavirus.  We will do her labs in the fall at a Medicare wellness check.  - levothyroxine (SYNTHROID/LEVOTHROID) 50 MCG tablet; Take 1 tablet (50 mcg) by mouth daily  Dispense: 90 tablet; Refill: 3    2. Hyperlipidemia LDL goal <160  Tolerating the simvastatin at 20 mg fine.  We will continue this and refill for the next year, will do labs in the fall when she is in for a full physical.  - simvastatin (ZOCOR) 20 MG tablet; One tablet daily  Dispense: 90 tablet; Refill: 3    No follow-ups on file.      Phone call duration:  8 minutes    Yon Gallagher MD

## 2020-07-20 ENCOUNTER — OFFICE VISIT (OUTPATIENT)
Dept: SURGERY | Facility: CLINIC | Age: 70
End: 2020-07-20
Payer: COMMERCIAL

## 2020-07-20 VITALS
HEART RATE: 82 BPM | WEIGHT: 194.2 LBS | DIASTOLIC BLOOD PRESSURE: 70 MMHG | BODY MASS INDEX: 32.32 KG/M2 | SYSTOLIC BLOOD PRESSURE: 122 MMHG

## 2020-07-20 DIAGNOSIS — I83.811 VARICOSE VEINS OF LEG WITH PAIN, RIGHT: Primary | ICD-10-CM

## 2020-07-20 DIAGNOSIS — I83.891 VARICOSE VEINS OF LEG WITH EDEMA, RIGHT: ICD-10-CM

## 2020-07-20 PROCEDURE — 99213 OFFICE O/P EST LOW 20 MIN: CPT | Performed by: SPECIALIST

## 2020-07-20 NOTE — LETTER
7/20/2020         RE: Elisabeth Voss  [address]  [place] [address]        Dear Colleague,    Thank you for referring your patient, Elisabeth Voss, to the [facility]. Please see a copy of my visit note below.    F/U for varicose veins    Subjective:   Patient is a 70-year-old white female with a 50-year history of bilateral lower extremity varicose veins.  Last seen by me in November for superficial phlebitis and although symptoms have resolved.  She reports them as being essentially asymptomatic up until about 2 weeks ago when she developed pain, pinching and pulling with some occasional swelling in her right leg in the area the varicose veins.  She states it does resolve with elevation.  She has not been wearing compression socks.  She does not recall any trauma to the area.  Denies any prior edema, DVT, superficial phlebitis or nonhealing wounds.  She reports her veins in her left leg have been essentially been asymptomatic.  She now presents to me for evaluation of her new right leg symptoms.      Objective:  B/P: 122/70, T: Data Unavailable, P: 82, R: Data Unavailable  Ext; Warm, no edema, (+)DP/PT pulses.  Bilateral varicose veins R>>L.  +1 edema right calf  Tender medial veins.  No phlebitis    Assessment/plan:  This is a 70-year-old lady presenting with a many year history of bilateral lower extremity varicose veins.  Her right leg is recent become symptomatic with pain, pulling and edema worse at the end of the day.  She is a CEAP 3 on the right and CEAP 2 the left.  After discussion with the patient the plan at this time is obtain a right leg ultrasound for reflux and restart her in compression socks.  Should she remain symptomatic despite compression therapy should be a candidate for treatment as determined by the ultrasound findings.  She will follow-up with me after the ultrasound.      Martin Nelson MD, FACS    Request for vein solutions faxed to [facility].  Vein solutions packet with brochure/pen given to patient.    Again, thank you for allowing me to participate in the care of your patient.        Sincerely,        Martin Nelson MD

## 2020-07-20 NOTE — PROGRESS NOTES
F/U for varicose veins    Subjective:   Patient is a 70-year-old white female with a 50-year history of bilateral lower extremity varicose veins.  Last seen by me in November for superficial phlebitis and although symptoms have resolved.  She reports them as being essentially asymptomatic up until about 2 weeks ago when she developed pain, pinching and pulling with some occasional swelling in her right leg in the area the varicose veins.  She states it does resolve with elevation.  She has not been wearing compression socks.  She does not recall any trauma to the area.  Denies any prior edema, DVT, superficial phlebitis or nonhealing wounds.  She reports her veins in her left leg have been essentially been asymptomatic.  She now presents to me for evaluation of her new right leg symptoms.      Objective:  B/P: 122/70, T: Data Unavailable, P: 82, R: Data Unavailable  Ext; Warm, no edema, (+)DP/PT pulses.  Bilateral varicose veins R>>L.  +1 edema right calf  Tender medial veins.  No phlebitis    Assessment/plan:  This is a 70-year-old lady presenting with a many year history of bilateral lower extremity varicose veins.  Her right leg is recent become symptomatic with pain, pulling and edema worse at the end of the day.  She is a CEAP 3 on the right and CEAP 2 the left.  After discussion with the patient the plan at this time is obtain a right leg ultrasound for reflux and restart her in compression socks.  Should she remain symptomatic despite compression therapy should be a candidate for treatment as determined by the ultrasound findings.  She will follow-up with me after the ultrasound.      Martin Nelson MD, FACS

## 2020-07-20 NOTE — PROGRESS NOTES
Request for vein solutions faxed to [facility].  [facility] packet with brochure/pen given to patient.

## 2020-07-21 ENCOUNTER — TELEPHONE (OUTPATIENT)
Dept: VASCULAR SURGERY | Facility: CLINIC | Age: 70
End: 2020-07-21

## 2020-07-21 NOTE — TELEPHONE ENCOUNTER
CALIM to schedule a right ultrasound per order from Dr. Nelson.    Marley Burt, Surgery Scheduler  [facility]

## 2020-07-23 ENCOUNTER — ONCOLOGY VISIT (OUTPATIENT)
Dept: ONCOLOGY | Facility: CLINIC | Age: 70
End: 2020-07-23
Payer: COMMERCIAL

## 2020-07-23 VITALS
HEIGHT: 65 IN | TEMPERATURE: 97.6 F | DIASTOLIC BLOOD PRESSURE: 66 MMHG | WEIGHT: 194.3 LBS | SYSTOLIC BLOOD PRESSURE: 110 MMHG | BODY MASS INDEX: 32.37 KG/M2 | HEART RATE: 94 BPM

## 2020-07-23 DIAGNOSIS — E78.5 HYPERLIPIDEMIA LDL GOAL <160: Primary | ICD-10-CM

## 2020-07-23 DIAGNOSIS — K21.00 GASTROESOPHAGEAL REFLUX DISEASE WITH ESOPHAGITIS: ICD-10-CM

## 2020-07-23 DIAGNOSIS — E03.2 HYPOTHYROIDISM DUE TO MEDICATION: ICD-10-CM

## 2020-07-23 DIAGNOSIS — C43.30 MALIGNANT MELANOMA OF SKIN OF FACE (H): ICD-10-CM

## 2020-07-23 LAB
ALBUMIN SERPL-MCNC: 4.2 G/DL (ref 3.4–5)
ALP SERPL-CCNC: 69 U/L (ref 40–150)
ALT SERPL W P-5'-P-CCNC: 24 U/L (ref 0–50)
ANION GAP SERPL CALCULATED.3IONS-SCNC: 4 MMOL/L (ref 3–14)
AST SERPL W P-5'-P-CCNC: 20 U/L (ref 0–45)
BASOPHILS # BLD AUTO: 0 10E9/L (ref 0–0.2)
BASOPHILS NFR BLD AUTO: 0.7 %
BILIRUB SERPL-MCNC: 0.5 MG/DL (ref 0.2–1.3)
BUN SERPL-MCNC: 17 MG/DL (ref 7–30)
CALCIUM SERPL-MCNC: 9.4 MG/DL (ref 8.5–10.1)
CHLORIDE SERPL-SCNC: 108 MMOL/L (ref 94–109)
CO2 SERPL-SCNC: 28 MMOL/L (ref 20–32)
CREAT SERPL-MCNC: 1.02 MG/DL (ref 0.52–1.04)
DIFFERENTIAL METHOD BLD: NORMAL
EOSINOPHIL NFR BLD AUTO: 2.2 %
ERYTHROCYTE [DISTWIDTH] IN BLOOD BY AUTOMATED COUNT: 13.6 % (ref 10–15)
GFR SERPL CREATININE-BSD FRML MDRD: 55 ML/MIN/{1.73_M2}
GLUCOSE SERPL-MCNC: 89 MG/DL (ref 70–99)
HCT VFR BLD AUTO: 43.5 % (ref 35–47)
HGB BLD-MCNC: 14.3 G/DL (ref 11.7–15.7)
IMM GRANULOCYTES # BLD: 0 10E9/L (ref 0–0.4)
IMM GRANULOCYTES NFR BLD: 0.2 %
LDH SERPL L TO P-CCNC: 195 U/L (ref 81–234)
LYMPHOCYTES # BLD AUTO: 2 10E9/L (ref 0.8–5.3)
LYMPHOCYTES NFR BLD AUTO: 32.9 %
MCH RBC QN AUTO: 29.6 PG (ref 26.5–33)
MCHC RBC AUTO-ENTMCNC: 32.9 G/DL (ref 31.5–36.5)
MCV RBC AUTO: 90 FL (ref 78–100)
MONOCYTES # BLD AUTO: 0.4 10E9/L (ref 0–1.3)
MONOCYTES NFR BLD AUTO: 6.9 %
NEUTROPHILS # BLD AUTO: 3.4 10E9/L (ref 1.6–8.3)
NEUTROPHILS NFR BLD AUTO: 57.1 %
NRBC # BLD AUTO: 0 10*3/UL
NRBC BLD AUTO-RTO: 0 /100
PLATELET # BLD AUTO: 255 10E9/L (ref 150–450)
POTASSIUM SERPL-SCNC: 4.3 MMOL/L (ref 3.4–5.3)
PROT SERPL-MCNC: 7.1 G/DL (ref 6.8–8.8)
RBC # BLD AUTO: 4.83 10E12/L (ref 3.8–5.2)
SODIUM SERPL-SCNC: 140 MMOL/L (ref 133–144)
WBC # BLD AUTO: 5.9 10E9/L (ref 4–11)

## 2020-07-23 PROCEDURE — 36415 COLL VENOUS BLD VENIPUNCTURE: CPT | Performed by: INTERNAL MEDICINE

## 2020-07-23 PROCEDURE — 83615 LACTATE (LD) (LDH) ENZYME: CPT | Performed by: INTERNAL MEDICINE

## 2020-07-23 PROCEDURE — 99214 OFFICE O/P EST MOD 30 MIN: CPT | Performed by: INTERNAL MEDICINE

## 2020-07-23 PROCEDURE — 80053 COMPREHEN METABOLIC PANEL: CPT | Performed by: INTERNAL MEDICINE

## 2020-07-23 PROCEDURE — 85025 COMPLETE CBC W/AUTO DIFF WBC: CPT | Performed by: INTERNAL MEDICINE

## 2020-07-23 ASSESSMENT — MIFFLIN-ST. JEOR: SCORE: 1397.22

## 2020-07-23 ASSESSMENT — PAIN SCALES - GENERAL: PAINLEVEL: NO PAIN (0)

## 2020-07-23 NOTE — PATIENT INSTRUCTIONS
Follow-up in 1 year with laboratory tests.    Please follow up with Dr. Castellon in 1 year.  Please schedule labs 1-5 days prior to follow up appointment.    Lab Date/Time:    Office visit follow up with Dr. Castellon Date/Time:     If you have any questions or concerns please feel free to call.    If you need to reschedule please call:  Clinic or Lab Appointment - [phone]  Infusion - [phone]  Imaging - [phone]    Aleksander Schuler, RN, BSN, OCN  [facility]   Oncology/Hematology Care Coordinator RN  [facility]  [phone]

## 2020-07-23 NOTE — PROGRESS NOTES
DISCHARGE PLAN:  Next appointments: See patient instruction section  Departure Mode: Ambulatory  Accompanied by: self  2 minutes for nursing discharge (face to face time)     Elisabeth Voss is here today for Oncology follow up.  Writing nurse seen patient after Medical Oncology appointment to address questions/concerns/coordinate care. Patient to follow up in 1 year with labs prior.  Appointments scheduled and AVS mailed per patient request.  See patient instructions and Oncologist's Progress note for further details. Questions and concerns addressed to patient's satisfaction. Patient verbalized and demonstrated understanding of plan.  Contact information provided and patient is encouraged to call with any that arise in the interim of care.    Aleksander Schuler, RN, BSN, OCN   Oncology Care Coordinator RN  [facility]  [phone]  7/23/2020, 11:40 AM

## 2020-07-23 NOTE — PROGRESS NOTES
Hematology/ Oncology Follow-up Visit:  Jul 23, 2020    Reason for Visit:   Chief Complaint   Patient presents with     Oncology Clinic Visit     1 yr f/u -Malignant melanoma of skin of face     Results     Labs       Oncologic History:  Malignant melanoma of skin of face (H)  Elisabeth Voss is known malignant melanoma. she initially presented to her primary care provider on 06/26/2014. During that visit, she complained of a left lower jaw hyperpigmented mole that seems to be changing in color and size over the last 6 months. The mole was smaller in size but appeared to go slowly in time and became more hyperpigmented. There is no associated pain or other enlarged local lymphadenopathy. As a result of the patient's concern, she had excision of this lesion on the left lower chin on 06/20/2014, and the pathology revealed invasive nodular malignant melanoma, and the tumor size was 4 mm, and its thickness was 1.1 mm, and Robe level III. There was no ulceration. The margins were negative, but there was in situ melanoma about 0.46 mm from the peripheral margin and then invasive melanoma was present 0.66 mm from the nearest peripheral margin. The deep margin was about 2.5 mm from the tumor. There was no lymphovascular invasion, and the pathologic staging was T1a. She was subsequently seen by Dr. Eliza Foss of surgical oncology in consultation on 07/10/2014, and she is expected to have a followup PET scan. Her staging PET scan done on 07/11/2014 showed hypermetabolic left thyroid nodule measuring 1.5 x 1.2 cm, with SUV of 14.5. There was another peripherally calcified 1.1 cm right thyroid nodule that demonstrated no metabolic activity. No other lymph nodes or other hypermetabolic lesion. The surgical area appears to be well. There was no convincing evidence of malignancy anywhere else. CT of the maxillofacial area also showed no evidence of any subcutaneous mass or adenopathy in the surrounding area. The patient  "continues to feel well and is symptomatic. She is being followed by dermatology and oncology.      Interval History:  Patient is here today for follow-up visit.  She has been feeling well without any recent complaints of bone aches or pains.  Denies any shortness of breath or cough or wheezing.  She denies any fever or chills.  She denies any new skin rash.    Review Of Systems:  Constitutional: Negative for fever, chills, and night sweats.  Skin: negative.  Eyes: negative.  Ears/Nose/Throat: negative.  Respiratory: No shortness of breath, dyspnea on exertion, cough, or hemoptysis.  Cardiovascular: negative.  Gastrointestinal: negative.  Genitourinary: negative.  Musculoskeletal: negative.  Neurologic: negative.  Psychiatric: negative.  Hematologic/Lymphatic/Immunologic: negative.  Endocrine: negative.    All other ROS negative unless mentioned in interval history.    Past medical, social, surgical, and family histories reviewed.    Allergies:  Allergies as of 07/23/2020 - Reviewed 07/23/2020   Allergen Reaction Noted     No known drug allergies  04/10/2001       Current Medications:  Current Outpatient Medications   Medication Sig Dispense Refill     Biotin 5000 MCG TABS Take by mouth daily       levothyroxine (SYNTHROID/LEVOTHROID) 50 MCG tablet Take 1 tablet (50 mcg) by mouth daily 90 tablet 3     Multiple vitamin TABS Take 2 tablets by mouth daily       NAPROXEN PO Take 660 mg by mouth 2 times daily as needed for moderate pain       pantoprazole (PROTONIX) 40 MG EC tablet TAKE 1 TABLET BY MOUTH ONCE DAILY 90 tablet 3     simvastatin (ZOCOR) 20 MG tablet One tablet daily 90 tablet 3     vitamin C (ASCORBIC ACID) 250 MG tablet Take 250 mg by mouth daily          Physical Exam:  /66 (BP Location: Right arm, Patient Position: Sitting, Cuff Size: Adult Regular)   Pulse 94   Temp 97.6  F (36.4  C) (Temporal)   Ht 1.651 m (5' 5\")   Wt 88.1 kg (194 lb 4.8 oz)   LMP  (LMP Unknown)   Breastfeeding No   BMI " 32.33 kg/m    Wt Readings from Last 12 Encounters:   07/23/20 88.1 kg (194 lb 4.8 oz)   07/20/20 88.1 kg (194 lb 3.2 oz)   06/12/20 87.1 kg (192 lb)   11/18/19 88.5 kg (195 lb)   11/12/19 87.5 kg (193 lb)   11/01/19 87.1 kg (192 lb)   07/16/19 87.7 kg (193 lb 4.8 oz)   03/05/19 87.1 kg (192 lb)   01/15/19 88.2 kg (194 lb 8 oz)   07/31/18 89.9 kg (198 lb 3.2 oz)   02/28/18 86.2 kg (190 lb 1.6 oz)   01/30/18 87.6 kg (193 lb 3.2 oz)     ECOG performance status: 0  GENERAL APPEARANCE: Healthy, alert and in no acute distress.  HEENT: Sclerae anicteric. PERRLA. Oropharynx without ulcers, lesions, or thrush.  NECK: Supple. No asymmetry or masses.  LYMPHATICS: No palpable cervical, supraclavicular, axillary, or inguinal lymphadenopathy.  RESP: Lungs clear to auscultation bilaterally without rales, rhonchi or wheezes.  CARDIOVASCULAR: Regular rate and rhythm. Normal S1, S2; no S3 or S4. No murmur, gallop, or rub.  ABDOMEN: Soft, nontender. Bowel sounds normal. No palpable organomegaly or masses.  MUSCULOSKELETAL: Extremities without gross deformities noted. No edema of bilateral lower extremities.  SKIN: No suspicious lesions or rashes.  NEURO: Alert and oriented x 3. Cranial nerves II-XII grossly intact.  PSYCHIATRIC: Mentation and affect appear normal.    Laboratory/Imaging Studies:  No visits with results within 2 Week(s) from this visit.   Latest known visit with results is:   Office Visit on 11/01/2019   Component Date Value Ref Range Status     WBC 11/01/2019 6.3  4.0 - 11.0 10e9/L Final     RBC Count 11/01/2019 4.98  3.8 - 5.2 10e12/L Final     Hemoglobin 11/01/2019 14.1  11.7 - 15.7 g/dL Final     Hematocrit 11/01/2019 43.7  35.0 - 47.0 % Final     MCV 11/01/2019 88  78 - 100 fl Final     MCH 11/01/2019 28.3  26.5 - 33.0 pg Final     MCHC 11/01/2019 32.3  31.5 - 36.5 g/dL Final     RDW 11/01/2019 14.9  10.0 - 15.0 % Final     Platelet Count 11/01/2019 248  150 - 450 10e9/L Final     % Neutrophils 11/01/2019 58.7  %  Final     % Lymphocytes 11/01/2019 31.4  % Final     % Monocytes 11/01/2019 7.5  % Final     % Eosinophils 11/01/2019 2.1  % Final     % Basophils 11/01/2019 0.3  % Final     Absolute Neutrophil 11/01/2019 3.7  1.6 - 8.3 10e9/L Final     Absolute Lymphocytes 11/01/2019 2.0  0.8 - 5.3 10e9/L Final     Absolute Monocytes 11/01/2019 0.5  0.0 - 1.3 10e9/L Final     Absolute Eosinophils 11/01/2019 0.1  0.0 - 0.7 10e9/L Final     Absolute Basophils 11/01/2019 0.0  0.0 - 0.2 10e9/L Final     Diff Method 11/01/2019 Automated Method   Final          Assessment and plan:    (C43.30) Malignant melanoma of skin of face (H)  I reviewed with patient today most recent laboratory tests.  There is no clinical evidence of recurrence of melanoma.  The patient will continue to see dermatology for surveillance.  I will see her again in 1 year time or sooner if there are new developments or concerns.    (E78.5) Hyperlipidemia LDL goal <160   Patient currently on simvastatin 20 mg orally daily.    (E03.2) Hypothyroidism due to medication  Patient currently on Synthroid 50 mcg orally daily.    (K21.0) Gastroesophageal reflux disease with esophagitis  Patient currently on pantoprazole 40 mg orally daily.    The patient is ready to learn, no apparent learning barriers were identified.  Diagnosis and treatment plans were explained to the patient. The patient expressed understanding of the content. The patient asked appropriate questions. The patient questions were answered to her satisfaction.    Chart documentation with Dragon Voice recognition Software. Although reviewed after completion, some words and grammatical errors may remain.

## 2020-07-23 NOTE — LETTER
"    7/23/2020         RE: Elisabeth Voss  [address] [facility] [address]        Dear Colleague,    Thank you for referring your patient, Elisabeth Voss, to the [facility]. Please see a copy of my visit note below.    Oncology Rooming Note    July 23, 2020 8:05 AM   Elisabeth Voss is a 70 year old female who presents for:    Chief Complaint   Patient presents with     Oncology Clinic Visit     1 yr f/u -Malignant melanoma of skin of face     Results     Labs     Initial Vitals: /66 (BP Location: Right arm, Patient Position: Sitting, Cuff Size: Adult Regular)   Pulse 94   Temp 97.6  F (36.4  C) (Temporal)   Ht 1.651 m (5' 5\")   Wt 88.1 kg (194 lb 4.8 oz)   LMP  (LMP Unknown)   Breastfeeding No   BMI 32.33 kg/m   Estimated body mass index is 32.33 kg/m  as calculated from the following:    Height as of this encounter: 1.651 m (5' 5\").    Weight as of this encounter: 88.1 kg (194 lb 4.8 oz). Body surface area is 2.01 meters squared.  No Pain (0) Comment: Data Unavailable   No LMP recorded (lmp unknown). Patient has had a hysterectomy.  Allergies reviewed: Yes  Medications reviewed: Yes    Medications: Medication refills not needed today.  Pharmacy name entered into EPIC:    [facility] [place], [place] - [address] [facility] [address] [place] [address] DR  [facility] [address] [place] [address]    Clinical concerns: No new concerns. Concerns reviewed with Dr. Ravinder Nielsen, LUIS on 7/23/2020 at 8:05 AM              Hematology/ Oncology Follow-up Visit:  Jul 23, 2020    Reason for Visit:   Chief Complaint   Patient presents with     Oncology Clinic Visit     1 yr f/u -Malignant melanoma of skin of face     Results     Labs       Oncologic History:  Malignant melanoma of skin of face (H)  Elisabeth Voss is known malignant melanoma. she initially presented to her primary care provider on 06/26/2014. " During that visit, she complained of a left lower jaw hyperpigmented mole that seems to be changing in color and size over the last 6 months. The mole was smaller in size but appeared to go slowly in time and became more hyperpigmented. There is no associated pain or other enlarged local lymphadenopathy. As a result of the patient's concern, she had excision of this lesion on the left lower chin on 06/20/2014, and the pathology revealed invasive nodular malignant melanoma, and the tumor size was 4 mm, and its thickness was 1.1 mm, and Robe level III. There was no ulceration. The margins were negative, but there was in situ melanoma about 0.46 mm from the peripheral margin and then invasive melanoma was present 0.66 mm from the nearest peripheral margin. The deep margin was about 2.5 mm from the tumor. There was no lymphovascular invasion, and the pathologic staging was T1a. She was subsequently seen by Dr. Eliza Foss of surgical oncology in consultation on 07/10/2014, and she is expected to have a followup PET scan. Her staging PET scan done on 07/11/2014 showed hypermetabolic left thyroid nodule measuring 1.5 x 1.2 cm, with SUV of 14.5. There was another peripherally calcified 1.1 cm right thyroid nodule that demonstrated no metabolic activity. No other lymph nodes or other hypermetabolic lesion. The surgical area appears to be well. There was no convincing evidence of malignancy anywhere else. CT of the maxillofacial area also showed no evidence of any subcutaneous mass or adenopathy in the surrounding area. The patient continues to feel well and is symptomatic. She is being followed by dermatology and oncology.      Interval History:  Patient is here today for follow-up visit.  She has been feeling well without any recent complaints of bone aches or pains.  Denies any shortness of breath or cough or wheezing.  She denies any fever or chills.  She denies any new skin rash.    Review Of  "Systems:  Constitutional: Negative for fever, chills, and night sweats.  Skin: negative.  Eyes: negative.  Ears/Nose/Throat: negative.  Respiratory: No shortness of breath, dyspnea on exertion, cough, or hemoptysis.  Cardiovascular: negative.  Gastrointestinal: negative.  Genitourinary: negative.  Musculoskeletal: negative.  Neurologic: negative.  Psychiatric: negative.  Hematologic/Lymphatic/Immunologic: negative.  Endocrine: negative.    All other ROS negative unless mentioned in interval history.    Past medical, social, surgical, and family histories reviewed.    Allergies:  Allergies as of 07/23/2020 - Reviewed 07/23/2020   Allergen Reaction Noted     No known drug allergies  04/10/2001       Current Medications:  Current Outpatient Medications   Medication Sig Dispense Refill     Biotin 5000 MCG TABS Take by mouth daily       levothyroxine (SYNTHROID/LEVOTHROID) 50 MCG tablet Take 1 tablet (50 mcg) by mouth daily 90 tablet 3     Multiple vitamin TABS Take 2 tablets by mouth daily       NAPROXEN PO Take 660 mg by mouth 2 times daily as needed for moderate pain       pantoprazole (PROTONIX) 40 MG EC tablet TAKE 1 TABLET BY MOUTH ONCE DAILY 90 tablet 3     simvastatin (ZOCOR) 20 MG tablet One tablet daily 90 tablet 3     vitamin C (ASCORBIC ACID) 250 MG tablet Take 250 mg by mouth daily          Physical Exam:  /66 (BP Location: Right arm, Patient Position: Sitting, Cuff Size: Adult Regular)   Pulse 94   Temp 97.6  F (36.4  C) (Temporal)   Ht 1.651 m (5' 5\")   Wt 88.1 kg (194 lb 4.8 oz)   LMP  (LMP Unknown)   Breastfeeding No   BMI 32.33 kg/m    Wt Readings from Last 12 Encounters:   07/23/20 88.1 kg (194 lb 4.8 oz)   07/20/20 88.1 kg (194 lb 3.2 oz)   06/12/20 87.1 kg (192 lb)   11/18/19 88.5 kg (195 lb)   11/12/19 87.5 kg (193 lb)   11/01/19 87.1 kg (192 lb)   07/16/19 87.7 kg (193 lb 4.8 oz)   03/05/19 87.1 kg (192 lb)   01/15/19 88.2 kg (194 lb 8 oz)   07/31/18 89.9 kg (198 lb 3.2 oz)   02/28/18 " 86.2 kg (190 lb 1.6 oz)   01/30/18 87.6 kg (193 lb 3.2 oz)     ECOG performance status: 0  GENERAL APPEARANCE: Healthy, alert and in no acute distress.  HEENT: Sclerae anicteric. PERRLA. Oropharynx without ulcers, lesions, or thrush.  NECK: Supple. No asymmetry or masses.  LYMPHATICS: No palpable cervical, supraclavicular, axillary, or inguinal lymphadenopathy.  RESP: Lungs clear to auscultation bilaterally without rales, rhonchi or wheezes.  CARDIOVASCULAR: Regular rate and rhythm. Normal S1, S2; no S3 or S4. No murmur, gallop, or rub.  ABDOMEN: Soft, nontender. Bowel sounds normal. No palpable organomegaly or masses.  MUSCULOSKELETAL: Extremities without gross deformities noted. No edema of bilateral lower extremities.  SKIN: No suspicious lesions or rashes.  NEURO: Alert and oriented x 3. Cranial nerves II-XII grossly intact.  PSYCHIATRIC: Mentation and affect appear normal.    Laboratory/Imaging Studies:  No visits with results within 2 Week(s) from this visit.   Latest known visit with results is:   Office Visit on 11/01/2019   Component Date Value Ref Range Status     WBC 11/01/2019 6.3  4.0 - 11.0 10e9/L Final     RBC Count 11/01/2019 4.98  3.8 - 5.2 10e12/L Final     Hemoglobin 11/01/2019 14.1  11.7 - 15.7 g/dL Final     Hematocrit 11/01/2019 43.7  35.0 - 47.0 % Final     MCV 11/01/2019 88  78 - 100 fl Final     MCH 11/01/2019 28.3  26.5 - 33.0 pg Final     MCHC 11/01/2019 32.3  31.5 - 36.5 g/dL Final     RDW 11/01/2019 14.9  10.0 - 15.0 % Final     Platelet Count 11/01/2019 248  150 - 450 10e9/L Final     % Neutrophils 11/01/2019 58.7  % Final     % Lymphocytes 11/01/2019 31.4  % Final     % Monocytes 11/01/2019 7.5  % Final     % Eosinophils 11/01/2019 2.1  % Final     % Basophils 11/01/2019 0.3  % Final     Absolute Neutrophil 11/01/2019 3.7  1.6 - 8.3 10e9/L Final     Absolute Lymphocytes 11/01/2019 2.0  0.8 - 5.3 10e9/L Final     Absolute Monocytes 11/01/2019 0.5  0.0 - 1.3 10e9/L Final     Absolute  Eosinophils 11/01/2019 0.1  0.0 - 0.7 10e9/L Final     Absolute Basophils 11/01/2019 0.0  0.0 - 0.2 10e9/L Final     Diff Method 11/01/2019 Automated Method   Final          Assessment and plan:    (C43.30) Malignant melanoma of skin of face (H)  I reviewed with patient today most recent laboratory tests.  There is no clinical evidence of recurrence of melanoma.  The patient will continue to see dermatology for surveillance.  I will see her again in 1 year time or sooner if there are new developments or concerns.    (E78.5) Hyperlipidemia LDL goal <160   Patient currently on simvastatin 20 mg orally daily.    (E03.2) Hypothyroidism due to medication  Patient currently on Synthroid 50 mcg orally daily.    (K21.0) Gastroesophageal reflux disease with esophagitis  Patient currently on pantoprazole 40 mg orally daily.    The patient is ready to learn, no apparent learning barriers were identified.  Diagnosis and treatment plans were explained to the patient. The patient expressed understanding of the content. The patient asked appropriate questions. The patient questions were answered to her satisfaction.    Chart documentation with Dragon Voice recognition Software. Although reviewed after completion, some words and grammatical errors may remain.    DISCHARGE PLAN:  Next appointments: See patient instruction section  Departure Mode: Ambulatory  Accompanied by: self  2 minutes for nursing discharge (face to face time)     Elisabeth Voss is here today for Oncology follow up.  Writing nurse seen patient after Medical Oncology appointment to address questions/concerns/coordinate care. Patient to follow up in 1 year with labs prior.  Appointments scheduled and AVS mailed per patient request.  See patient instructions and Oncologist's Progress note for further details. Questions and concerns addressed to patient's satisfaction. Patient verbalized and demonstrated understanding of plan.  Contact information provided and  patient is encouraged to call with any that arise in the interim of care.    Aleksander Schuler RN, BSN, OCN   Oncology Care Coordinator RN  [facility]  [phone]  7/23/2020, 11:40 AM          Again, thank you for allowing me to participate in the care of your patient.        Sincerely,        Dorothy Castellon MD

## 2020-07-23 NOTE — PROGRESS NOTES
"Oncology Rooming Note    July 23, 2020 8:05 AM   Elisabeth Voss is a 70 year old female who presents for:    Chief Complaint   Patient presents with     Oncology Clinic Visit     1 yr f/u -Malignant melanoma of skin of face     Results     Labs     Initial Vitals: /66 (BP Location: Right arm, Patient Position: Sitting, Cuff Size: Adult Regular)   Pulse 94   Temp 97.6  F (36.4  C) (Temporal)   Ht 1.651 m (5' 5\")   Wt 88.1 kg (194 lb 4.8 oz)   LMP  (LMP Unknown)   Breastfeeding No   BMI 32.33 kg/m   Estimated body mass index is 32.33 kg/m  as calculated from the following:    Height as of this encounter: 1.651 m (5' 5\").    Weight as of this encounter: 88.1 kg (194 lb 4.8 oz). Body surface area is 2.01 meters squared.  No Pain (0) Comment: Data Unavailable   No LMP recorded (lmp unknown). Patient has had a hysterectomy.  Allergies reviewed: Yes  Medications reviewed: Yes    Medications: Medication refills not needed today.  Pharmacy name entered into [facility]:    [facility] - [place], [place] - [address] [place] [address] PHARMACY [place], [place] - [address] [facility] [address]  [facility] - [facility] [address] [facility]    Clinical concerns: No new concerns. Concerns reviewed with Dr. Ravinder Nielsen CMA on 7/23/2020 at 8:05 AM            "

## 2020-08-10 DIAGNOSIS — I83.811 VARICOSE VEINS OF LEG WITH PAIN, RIGHT: Primary | ICD-10-CM

## 2020-08-11 ENCOUNTER — ANCILLARY PROCEDURE (OUTPATIENT)
Dept: ULTRASOUND IMAGING | Facility: CLINIC | Age: 70
End: 2020-08-11
Payer: COMMERCIAL

## 2020-08-11 ENCOUNTER — OFFICE VISIT (OUTPATIENT)
Dept: VASCULAR SURGERY | Facility: CLINIC | Age: 70
End: 2020-08-11
Payer: COMMERCIAL

## 2020-08-11 DIAGNOSIS — I83.811 VARICOSE VEINS OF LEG WITH PAIN, RIGHT: ICD-10-CM

## 2020-08-11 DIAGNOSIS — I83.811 VARICOSE VEINS OF LEG WITH PAIN, RIGHT: Primary | ICD-10-CM

## 2020-08-11 PROCEDURE — 99207 ZZC NO CHARGE NURSE ONLY: CPT

## 2020-08-11 PROCEDURE — 93971 EXTREMITY STUDY: CPT | Mod: RT | Performed by: SPECIALIST

## 2020-08-18 ENCOUNTER — OFFICE VISIT (OUTPATIENT)
Dept: VASCULAR SURGERY | Facility: CLINIC | Age: 70
End: 2020-08-18
Payer: COMMERCIAL

## 2020-08-18 DIAGNOSIS — I83.891 VARICOSE VEINS OF RIGHT LEG WITH EDEMA: ICD-10-CM

## 2020-08-18 DIAGNOSIS — I80.9 SUPERFICIAL PHLEBITIS: ICD-10-CM

## 2020-08-18 DIAGNOSIS — I83.811 VARICOSE VEINS OF LEG WITH PAIN, RIGHT: Primary | ICD-10-CM

## 2020-08-18 PROCEDURE — 99213 OFFICE O/P EST LOW 20 MIN: CPT | Performed by: SPECIALIST

## 2020-08-18 NOTE — LETTER
8/18/2020         RE: Elisabeth Voss  [address]   [place] [address]        Dear Colleague,    Thank you for referring your patient, Elisabeth Voss, to the [facility]. Please see a copy of my visit note below.    F/U for varicose veins    Subjective:   Patient is a 70-year-old white female with a 50-year history of bilateral lower extremity varicose veins.  Last seen by me in November for superficial phlebitis and although symptoms have resolved.  She reports them as being essentially asymptomatic up until about 6 weeks ago when she developed pain, pinching and pulling with some occasional swelling in her right leg in the area the varicose veins.  She states it does resolve with elevation.  .  She does not recall any trauma to the area.  Denies any prior edema, DVT, superficial phlebitis or nonhealing wounds.  She reports her veins in her left leg have been essentially been asymptomatic.      She also now reports a painful varicose cluster on her right medial calf.  She has been wearing compression with limited relief.  She had an ultrasound for which she now follows up.    Objective:  B/P: 122/70, T: Data Unavailable, P: 82, R: Data Unavailable  Ext; Warm, no edema, (+)DP/PT pulses.  Bilateral varicose veins R>>L.  +1 edema right calf  Tender medial veins with thrombus.        Assessment/plan:  This is a 70-year-old lady presenting with a many year history of bilateral lower extremity varicose veins.  Her right leg is recent become symptomatic with pain, pulling and edema worse at the end of the day.  She is a CEAP 3 on the right and CEAP 2 the left.  She also now has a new phlebitis that was not present on her previous exam.  Her ultrasound revealed right GSV reflux and  reflux.  Should she be symptomatic despite compression therapy she would be a candidate for a right GSV radiofrequency ablation with  ablation and medically  necessary stab phlebectomy.    Plan at this time is to continue her NSAIDs for her superficial phlebitis which is slowly improving.  She will continue her compression socks and follow-up with me in 4 to 6 weeks.  Should she remain symptomatic despite compression therapy then consideration be given to treatment at that time.  She knows to be seen sooner should problems develop.    Martin Nelson MD, FACS    Again, thank you for allowing me to participate in the care of your patient.        Sincerely,        Martin Nelson MD

## 2020-08-18 NOTE — PROGRESS NOTES
F/U for varicose veins    Subjective:   Patient is a 70-year-old white female with a 50-year history of bilateral lower extremity varicose veins.  Last seen by me in November for superficial phlebitis and although symptoms have resolved.  She reports them as being essentially asymptomatic up until about 6 weeks ago when she developed pain, pinching and pulling with some occasional swelling in her right leg in the area the varicose veins.  She states it does resolve with elevation.  .  She does not recall any trauma to the area.  Denies any prior edema, DVT, superficial phlebitis or nonhealing wounds.  She reports her veins in her left leg have been essentially been asymptomatic.      She also now reports a painful varicose cluster on her right medial calf.  She has been wearing compression with limited relief.  She had an ultrasound for which she now follows up.    Objective:  B/P: 122/70, T: Data Unavailable, P: 82, R: Data Unavailable  Ext; Warm, no edema, (+)DP/PT pulses.  Bilateral varicose veins R>>L.  +1 edema right calf  Tender medial veins with thrombus.        Assessment/plan:  This is a 70-year-old lady presenting with a many year history of bilateral lower extremity varicose veins.  Her right leg is recent become symptomatic with pain, pulling and edema worse at the end of the day.  She is a CEAP 3 on the right and CEAP 2 the left.  She also now has a new phlebitis that was not present on her previous exam.  Her ultrasound revealed right GSV reflux and  reflux.  Should she be symptomatic despite compression therapy she would be a candidate for a right GSV radiofrequency ablation with  ablation and medically necessary stab phlebectomy.    Plan at this time is to continue her NSAIDs for her superficial phlebitis which is slowly improving.  She will continue her compression socks and follow-up with me in 4 to 6 weeks.  Should she remain symptomatic despite compression therapy then  consideration be given to treatment at that time.  She knows to be seen sooner should problems develop.    Martin Nelson MD, FACS

## 2020-09-24 ENCOUNTER — OFFICE VISIT (OUTPATIENT)
Dept: SURGERY | Facility: CLINIC | Age: 70
End: 2020-09-24
Payer: COMMERCIAL

## 2020-09-24 VITALS
WEIGHT: 193.8 LBS | BODY MASS INDEX: 32.25 KG/M2 | TEMPERATURE: 97.8 F | DIASTOLIC BLOOD PRESSURE: 63 MMHG | SYSTOLIC BLOOD PRESSURE: 104 MMHG

## 2020-09-24 DIAGNOSIS — I80.01 SUPERFICIAL PHLEBITIS AND THROMBOPHLEBITIS OF RIGHT LOWER EXTREMITY: Primary | ICD-10-CM

## 2020-09-24 PROCEDURE — 99213 OFFICE O/P EST LOW 20 MIN: CPT | Performed by: SPECIALIST

## 2020-09-24 NOTE — PROGRESS NOTES
F/U for varicose veins    Subjective:   Patient is a 70-year-old white female with a 50-year history of bilateral lower extremity varicose veins.  Last seen by me in November for superficial phlebitis and although symptoms have resolved.  She reports them as being essentially asymptomatic up until about 6 weeks ago when she developed pain, pinching and pulling with some occasional swelling in her right leg in the area the varicose veins.  She states it does resolve with elevation.  .  She does not recall any trauma to the area.  Denies any prior edema, DVT, superficial phlebitis or nonhealing wounds.  She reports her veins in her left leg have been essentially been asymptomatic.      She was seen about a month ago for superficial phlebitis.  Her leg is doing better but she still has some discomfort at the end of the day when she takes her compression sock off.  Reports is being much better and only minimal.      Objective:  B/P: 104/63, T: 97.8, P: Data Unavailable, R: Data Unavailable  Ext; Warm, no edema, (+)DP/PT pulses.  Bilateral varicose veins R>>L.  Thrombosed medial veins nontender.      Assessment/plan:  This is a 70-year-old lady presenting with a many year history of bilateral lower extremity varicose veins.  Her right leg is recent become symptomatic with pain, pulling and edema worse at the end of the day.  She is a CEAP 3 on the right and CEAP 2 the left.  She also now has a new phlebitis that was not present on her previous exam.  Her ultrasound revealed right GSV reflux and  reflux.  Should she be symptomatic despite compression therapy she would be a candidate for a right GSV radiofrequency ablation with  ablation and medically necessary stab phlebectomy.    Superficial phlebitis is improved with NSAIDs and local care.  She is asymptomatic when she wears compression socks.  The plan at this time is to continue her in compression socks and take NSAIDs as necessary.  She will  follow-up with me as necessary.      Martin Nelson MD, FACS

## 2020-09-24 NOTE — LETTER
9/24/2020         RE: Elisabeth Voss  [address]   [place] [address]        Dear Colleague,    Thank you for referring your patient, Elisabeth Voss, to the [facility]. Please see a copy of my visit note below.    F/U for varicose veins    Subjective:   Patient is a 70-year-old white female with a 50-year history of bilateral lower extremity varicose veins.  Last seen by me in November for superficial phlebitis and although symptoms have resolved.  She reports them as being essentially asymptomatic up until about 6 weeks ago when she developed pain, pinching and pulling with some occasional swelling in her right leg in the area the varicose veins.  She states it does resolve with elevation.  .  She does not recall any trauma to the area.  Denies any prior edema, DVT, superficial phlebitis or nonhealing wounds.  She reports her veins in her left leg have been essentially been asymptomatic.      She was seen about a month ago for superficial phlebitis.  Her leg is doing better but she still has some discomfort at the end of the day when she takes her compression sock off.  Reports is being much better and only minimal.      Objective:  B/P: 104/63, T: 97.8, P: Data Unavailable, R: Data Unavailable  Ext; Warm, no edema, (+)DP/PT pulses.  Bilateral varicose veins R>>L.  Thrombosed medial veins nontender.      Assessment/plan:  This is a 70-year-old lady presenting with a many year history of bilateral lower extremity varicose veins.  Her right leg is recent become symptomatic with pain, pulling and edema worse at the end of the day.  She is a CEAP 3 on the right and CEAP 2 the left.  She also now has a new phlebitis that was not present on her previous exam.  Her ultrasound revealed right GSV reflux and  reflux.  Should she be symptomatic despite compression therapy she would be a candidate for a right GSV radiofrequency ablation with  ablation and medically  necessary stab phlebectomy.    Superficial phlebitis is improved with NSAIDs and local care.  She is asymptomatic when she wears compression socks.  The plan at this time is to continue her in compression socks and take NSAIDs as necessary.  She will follow-up with me as necessary.      Martin Nelson MD, FACS    Again, thank you for allowing me to participate in the care of your patient.        Sincerely,        Martin Nelson MD

## 2020-10-02 ENCOUNTER — MYC MEDICAL ADVICE (OUTPATIENT)
Dept: INTERNAL MEDICINE | Facility: CLINIC | Age: 70
End: 2020-10-02

## 2021-01-09 ENCOUNTER — HEALTH MAINTENANCE LETTER (OUTPATIENT)
Age: 71
End: 2021-01-09

## 2021-03-17 ENCOUNTER — IMMUNIZATION (OUTPATIENT)
Dept: PEDIATRICS | Facility: CLINIC | Age: 71
End: 2021-03-17
Payer: COMMERCIAL

## 2021-03-17 PROCEDURE — 0001A PR COVID VAC PFIZER DIL RECON 30 MCG/0.3 ML IM: CPT

## 2021-03-17 PROCEDURE — 91300 PR COVID VAC PFIZER DIL RECON 30 MCG/0.3 ML IM: CPT

## 2021-04-03 ENCOUNTER — MYC MEDICAL ADVICE (OUTPATIENT)
Dept: INTERNAL MEDICINE | Facility: CLINIC | Age: 71
End: 2021-04-03

## 2021-04-08 DIAGNOSIS — K21.00 GASTROESOPHAGEAL REFLUX DISEASE WITH ESOPHAGITIS: ICD-10-CM

## 2021-04-12 RX ORDER — PANTOPRAZOLE SODIUM 40 MG/1
TABLET, DELAYED RELEASE ORAL
Qty: 90 TABLET | Refills: 0 | OUTPATIENT
Start: 2021-04-12

## 2021-04-12 NOTE — TELEPHONE ENCOUNTER
RN called and scheduled pt for a virtual appt tomorrow at 3:45 PM. Needs Follow-up on her Thyroid, GERD and Hyperlipidemia LOV: 6/12/20 with Dr. Gallagher ( virtual)   PROTONIX  Last filled 1/4/21 with instruction needs karen.   ESTEFANIA Glass

## 2021-04-13 ENCOUNTER — VIRTUAL VISIT (OUTPATIENT)
Dept: INTERNAL MEDICINE | Facility: CLINIC | Age: 71
End: 2021-04-13
Payer: COMMERCIAL

## 2021-04-13 VITALS
WEIGHT: 191 LBS | BODY MASS INDEX: 31.78 KG/M2 | DIASTOLIC BLOOD PRESSURE: 70 MMHG | HEART RATE: 83 BPM | SYSTOLIC BLOOD PRESSURE: 98 MMHG

## 2021-04-13 DIAGNOSIS — K21.00 GASTROESOPHAGEAL REFLUX DISEASE WITH ESOPHAGITIS, UNSPECIFIED WHETHER HEMORRHAGE: ICD-10-CM

## 2021-04-13 DIAGNOSIS — E78.5 HYPERLIPIDEMIA LDL GOAL <160: ICD-10-CM

## 2021-04-13 DIAGNOSIS — E03.8 OTHER SPECIFIED HYPOTHYROIDISM: Primary | ICD-10-CM

## 2021-04-13 DIAGNOSIS — Z12.31 VISIT FOR SCREENING MAMMOGRAM: ICD-10-CM

## 2021-04-13 PROCEDURE — 99214 OFFICE O/P EST MOD 30 MIN: CPT | Mod: 95 | Performed by: INTERNAL MEDICINE

## 2021-04-13 RX ORDER — PANTOPRAZOLE SODIUM 40 MG/1
40 TABLET, DELAYED RELEASE ORAL DAILY
Qty: 90 TABLET | Refills: 3 | Status: SHIPPED | OUTPATIENT
Start: 2021-04-13 | End: 2022-04-05

## 2021-04-13 NOTE — PROGRESS NOTES
"Rory is a 70 year old who is being evaluated via a billable telephone visit.      What phone number would you like to be contacted at? [phone]  How would you like to obtain your AVS? MyChart    Assessment & Plan     Gastroesophageal reflux disease with esophagitis, unspecified whether hemorrhage  Reflux disease is stable, needs a refill of her Protonix which is done.  - pantoprazole (PROTONIX) 40 MG EC tablet; Take 1 tablet (40 mg) by mouth daily    Other specified hypothyroidism  Hypothyroidism is stable she will do a TSH when she can come in and do fasting labs in the future.  - **TSH with free T4 reflex FUTURE anytime; Future    Hyperlipidemia LDL goal <160  Hyperlipidemia treated with a statin, needs fasting lipids and comprehensive panel.  - Lipid panel reflex to direct LDL Fasting; Future    Visit for screening mammogram  Recommended she get a mammogram as its been 2 years.  - *MA Screening Digital Bilateral; Future             BMI:   Estimated body mass index is 31.78 kg/m  as calculated from the following:    Height as of 20: 1.651 m (5' 5\").    Weight as of this encounter: 86.6 kg (191 lb).           No follow-ups on file.    Yon Gallagher MD  [facility]    Subjective   Rory is a 70 year old who presents for the following health issues     HPI     Chief Complaint   Patient presents with     Telephone     recheck medications     Daughter is sick with covid, son in law and grandkids are sick.  Father in law  from Covid.      She had a delay in her vaccines, but will get second shot on Thursday.     Taking her medications feeling ok, thyroid feels ok,     Hurt her arm last May, sore in the muscle hurt when doing the dock last May.     Past Medical History:   Diagnosis Date     Cancer (H)     melanoma of cheek     History of blood transfusion     with hysterectomy many years ago     Lyme disease      Thyroid disease     partial thyroidectomy 2014     Current Outpatient " Medications   Medication     levothyroxine (SYNTHROID/LEVOTHROID) 50 MCG tablet     NAPROXEN PO     pantoprazole (PROTONIX) 40 MG EC tablet     simvastatin (ZOCOR) 20 MG tablet     No current facility-administered medications for this visit.      Social History     Tobacco Use     Smoking status: Never Smoker     Smokeless tobacco: Never Used   Substance Use Topics     Alcohol use: Yes     Alcohol/week: 0.0 standard drinks     Comment: occasional     Drug use: No   .     Review of Systems         Objective           Vitals:  No vitals were obtained today due to virtual visit.    Physical Exam   healthy, alert and no distress  PSYCH: Alert and oriented times 3; coherent speech, normal   rate and volume, able to articulate logical thoughts, able   to abstract reason, no tangential thoughts, no hallucinations   or delusions  Her affect is normal  RESP: No cough, no audible wheezing, able to talk in full sentences  Remainder of exam unable to be completed due to telephone visits                Phone call duration: 10 minutes

## 2021-04-15 ENCOUNTER — IMMUNIZATION (OUTPATIENT)
Dept: PEDIATRICS | Facility: CLINIC | Age: 71
End: 2021-04-15
Attending: INTERNAL MEDICINE
Payer: COMMERCIAL

## 2021-04-15 PROCEDURE — 91300 PR COVID VAC PFIZER DIL RECON 30 MCG/0.3 ML IM: CPT

## 2021-04-15 PROCEDURE — 0002A PR COVID VAC PFIZER DIL RECON 30 MCG/0.3 ML IM: CPT

## 2021-04-16 DIAGNOSIS — Z12.31 VISIT FOR SCREENING MAMMOGRAM: ICD-10-CM

## 2021-04-16 DIAGNOSIS — E03.8 OTHER SPECIFIED HYPOTHYROIDISM: ICD-10-CM

## 2021-04-16 DIAGNOSIS — E78.5 HYPERLIPIDEMIA LDL GOAL <160: ICD-10-CM

## 2021-04-16 LAB
CHOLEST SERPL-MCNC: 224 MG/DL
HDLC SERPL-MCNC: 71 MG/DL
LDLC SERPL CALC-MCNC: 132 MG/DL
NONHDLC SERPL-MCNC: 153 MG/DL
TRIGL SERPL-MCNC: 105 MG/DL
TSH SERPL DL<=0.005 MIU/L-ACNC: 2.2 MU/L (ref 0.4–4)

## 2021-04-16 PROCEDURE — 36415 COLL VENOUS BLD VENIPUNCTURE: CPT | Performed by: INTERNAL MEDICINE

## 2021-04-16 PROCEDURE — 84443 ASSAY THYROID STIM HORMONE: CPT | Performed by: INTERNAL MEDICINE

## 2021-04-16 PROCEDURE — 80061 LIPID PANEL: CPT | Performed by: INTERNAL MEDICINE

## 2021-05-04 ENCOUNTER — TRANSFERRED RECORDS (OUTPATIENT)
Dept: HEALTH INFORMATION MANAGEMENT | Facility: CLINIC | Age: 71
End: 2021-05-04

## 2021-05-08 ENCOUNTER — HEALTH MAINTENANCE LETTER (OUTPATIENT)
Age: 71
End: 2021-05-08

## 2021-05-10 ENCOUNTER — HOSPITAL ENCOUNTER (OUTPATIENT)
Dept: MAMMOGRAPHY | Facility: CLINIC | Age: 71
Discharge: HOME | End: 2021-05-10
Attending: INTERNAL MEDICINE | Admitting: INTERNAL MEDICINE
Payer: COMMERCIAL

## 2021-05-10 DIAGNOSIS — Z12.31 VISIT FOR SCREENING MAMMOGRAM: ICD-10-CM

## 2021-05-10 PROCEDURE — 77063 BREAST TOMOSYNTHESIS BI: CPT

## 2021-05-12 ENCOUNTER — HOSPITAL ENCOUNTER (OUTPATIENT)
Dept: ULTRASOUND IMAGING | Facility: CLINIC | Age: 71
Discharge: HOME | End: 2021-05-12
Attending: INTERNAL MEDICINE | Admitting: INTERNAL MEDICINE
Payer: COMMERCIAL

## 2021-05-12 DIAGNOSIS — R92.8 ABNORMAL MAMMOGRAM: ICD-10-CM

## 2021-05-12 PROCEDURE — 76642 ULTRASOUND BREAST LIMITED: CPT | Mod: LT

## 2021-05-17 ENCOUNTER — OFFICE VISIT (OUTPATIENT)
Dept: ORTHOPEDICS | Facility: CLINIC | Age: 71
End: 2021-05-17
Payer: COMMERCIAL

## 2021-05-17 ENCOUNTER — ANCILLARY PROCEDURE (OUTPATIENT)
Dept: GENERAL RADIOLOGY | Facility: CLINIC | Age: 71
End: 2021-05-17
Attending: ORTHOPAEDIC SURGERY
Payer: COMMERCIAL

## 2021-05-17 VITALS
BODY MASS INDEX: 33.49 KG/M2 | HEIGHT: 64 IN | SYSTOLIC BLOOD PRESSURE: 120 MMHG | DIASTOLIC BLOOD PRESSURE: 82 MMHG | WEIGHT: 196.2 LBS

## 2021-05-17 DIAGNOSIS — M25.511 RIGHT SHOULDER PAIN: ICD-10-CM

## 2021-05-17 DIAGNOSIS — M75.31 CALCIFIC TENDINITIS OF RIGHT SHOULDER: Primary | ICD-10-CM

## 2021-05-17 DIAGNOSIS — M54.12 CERVICAL RADICULOPATHY: ICD-10-CM

## 2021-05-17 DIAGNOSIS — M75.51 ACUTE BURSITIS OF RIGHT SHOULDER: ICD-10-CM

## 2021-05-17 DIAGNOSIS — M75.41 IMPINGEMENT SYNDROME OF SHOULDER REGION, RIGHT: ICD-10-CM

## 2021-05-17 PROCEDURE — 73030 X-RAY EXAM OF SHOULDER: CPT | Mod: RT | Performed by: RADIOLOGY

## 2021-05-17 PROCEDURE — 99204 OFFICE O/P NEW MOD 45 MIN: CPT | Mod: 25 | Performed by: ORTHOPAEDIC SURGERY

## 2021-05-17 PROCEDURE — 20610 DRAIN/INJ JOINT/BURSA W/O US: CPT | Mod: RT | Performed by: ORTHOPAEDIC SURGERY

## 2021-05-17 RX ORDER — DICLOFENAC SODIUM 75 MG/1
75 TABLET, DELAYED RELEASE ORAL 2 TIMES DAILY
Qty: 30 TABLET | Refills: 0 | Status: SHIPPED | OUTPATIENT
Start: 2021-05-17 | End: 2021-08-12

## 2021-05-17 RX ORDER — TRIAMCINOLONE ACETONIDE 40 MG/ML
40 INJECTION, SUSPENSION INTRA-ARTICULAR; INTRAMUSCULAR ONCE
Status: COMPLETED | OUTPATIENT
Start: 2021-05-17 | End: 2021-05-17

## 2021-05-17 RX ADMIN — TRIAMCINOLONE ACETONIDE 40 MG: 40 INJECTION, SUSPENSION INTRA-ARTICULAR; INTRAMUSCULAR at 08:29

## 2021-05-17 ASSESSMENT — MIFFLIN-ST. JEOR: SCORE: 1385.99

## 2021-05-17 ASSESSMENT — PAIN SCALES - GENERAL: PAINLEVEL: MODERATE PAIN (5)

## 2021-05-17 NOTE — PROGRESS NOTES
Clinic Administered Medication Documentation      Injection Documentation     Injection was administered by provider (please see MAR for given by information). Please see MAR and medication order for additional information.     Site: Joint injection   Medication Used: Kenalog 40mg   Expiration Date:  10/2022      The following medication was given by CORAL Rodrigez, CNP, DNP:     MEDICATION: Kenalog 40mg/1ml  ROUTE: Joint Injection  SITE: RIGHT SHOULDER  DOSE: 1 mL  LOT #: [id number]  : [employer]   EXPIRATION DATE:  10/2022  NDC: [address]-1

## 2021-05-17 NOTE — LETTER
5/17/2021         RE: Elisabeth Voss  [address] [facility] [address]        Dear Colleague,    Thank you for referring your patient, Elisabeth Voss, to the [facility]. Please see a copy of my visit note below.    ORTHOPEDIC CONSULT      Chief Complaint: Elisabeth Voss is a 70 year old female who is being seen for   Chief Complaint   Patient presents with     Shoulder Pain     right shoulder pain     Consult       History of Present Illness:   Previously seen for other issues.  Has been about 3 years since last seen. First itme for her shoulder. Reports about 1 year history of right shoulder pain following putting her dock at her cabin. She does not report a specific incident during placing her dock but noticed her shoulder sore after.  Slowly progressive shoulder pain over the last year. Started as posterior/lateral shoulder pain that has progressed to now including the biceps, trap, and into the neck.  Starting two weeks ago also started to have issues with numbness/tingling radiating down the entire arm into the hand.  This is new. Intermittent.  Denies any dropping of objects, loss of b/b or difficulty walking.    Has pain at rest, with activity, especially with attempting any behind the back or lifting.  Has been struggling with crocheting as well.   Retired.   No previous right shoulder issues. Did have a left shoulder rotator cuff repair about 11 years ago. She states this feels different.     Denies previous neck issues.     Patient's past medical, surgical, social and family histories reviewed.     Past Medical History:   Diagnosis Date     Cancer (H)     melanoma of cheek     History of blood transfusion     with hysterectomy many years ago     Lyme disease      Thyroid disease     partial thyroidectomy 8/2014       Past Surgical History:   Procedure Laterality Date     ARTHROSCOPY KNEE WITH MEDIAL MENISCECTOMY Left 9/20/2016    Procedure: ARTHROSCOPY  KNEE WITH MEDIAL MENISCECTOMY;  Surgeon: Maximilian Ochoa DO;  Location: [facility]     BIOPSY NODE SENTINEL N/A 8/26/2014    Procedure: BIOPSY NODE SENTINEL;  Surgeon: Eliza Foss MD;  [facility]: [facility]     BLEPHAROPLASTY BILATERAL Bilateral 1/11/2018    Procedure: BLEPHAROPLASTY BILATERAL;  Bilateral upper eyelid blepharoplasty;  Surgeon: Jame Painter MD;  Location: [facility] OR     C TOTAL ABDOM HYSTERECTOMY  11/16/1993    TAHRSO, Rasheed urethropexy     CATARACT IOL, RT/LT Bilateral OD 12/22/16-OS 12/8/16     COLONOSCOPY  3/22/2013    Procedure: COLONOSCOPY;  colonoscopy;  Surgeon: Mike Lr MD;  Location: [facility]     HYSTERECTOMY, PAP NO LONGER INDICATED       ORTHOPEDIC SURGERY      shoulder surgery     PHACOEMULSIFICATION WITH STANDARD INTRAOCULAR LENS IMPLANT Left 12/8/2016    Procedure: PHACOEMULSIFICATION WITH STANDARD INTRAOCULAR LENS IMPLANT;  Surgeon: Jame Painter MD;  Location: [facility]     PHACOEMULSIFICATION WITH STANDARD INTRAOCULAR LENS IMPLANT Right 12/22/2016    Procedure: PHACOEMULSIFICATION WITH STANDARD INTRAOCULAR LENS IMPLANT;  Surgeon: Jame Painter MD;  Location: [facility]     RECONSTRUCT NOSE STAGE ONE N/A 9/5/2014    Procedure: RECONSTRUCT NOSE STAGE ONE;  Surgeon: Levy Holbrook MD;  [facility]     REVISE SCAR FACE N/A 8/26/2014    Procedure: REVISE SCAR FACE;  Surgeon: Eliza Foss MD;  [facility]     THYROIDECTOMY Left 8/26/2014    Procedure: THYROIDECTOMY;  Surgeon: Eliza Foss MD;  Location: [facility]       Medications:  levothyroxine (SYNTHROID/LEVOTHROID) 50 MCG tablet, Take 1 tablet (50 mcg) by mouth daily  NAPROXEN PO, Take 660 mg by mouth 2 times daily as needed for moderate pain  pantoprazole (PROTONIX) 40 MG EC tablet, Take 1 tablet (40 mg) by mouth daily  simvastatin (ZOCOR) 20 MG tablet, One tablet daily    No current facility-administered medications on file prior to visit.       Allergies   Allergen  "Reactions     No Known Drug Allergies        Social History     Occupational History     Not on file   Tobacco Use     Smoking status: Never Smoker     Smokeless tobacco: Never Used   Substance and Sexual Activity     Alcohol use: Yes     Alcohol/week: 0.0 standard drinks     Comment: occasional     Drug use: No     Sexual activity: Yes     Partners: Male     Birth control/protection: Surgical       Family History   Problem Relation Age of Onset     Cancer Mother 45        lung cancer     Cerebrovascular Disease Father         bulge in aorta     Heart Disease Maternal Grandfather      Heart Disease Paternal Grandfather      Cancer Sister         esophageal      Cancer Paternal Grandmother      Cancer Brother 65        Thyroid     Other Cancer Sister         lung       REVIEW OF SYSTEMS  10 point review systems performed otherwise negative as noted as per history of present illness.    Physical Exam:  Vitals: /82   Ht 1.619 m (5' 3.75\")   Wt 89 kg (196 lb 3.2 oz)   LMP  (LMP Unknown)   BMI 33.94 kg/m    BMI= Body mass index is 33.94 kg/m .  Constitutional: healthy, alert and no acute distress   Psychiatric: mentation appears normal and affect normal/bright  NEURO: no focal deficits  RESP: Normal with easy respirations and no use of accessory muscles to breathe, no audible wheezing or retractions  CV: RUE:  no edema         Regular rate and rhythm by palpation  SKIN: No erythema, rashes, excoriation, or breakdown. No evidence of infection.   JOINT/EXTREMITIES:right shoulder: Just some minimal restrictions to terminal forward elevation but otherwise range of motion appears to be within normal limits.  No focal rotator cuff weakness.  No biceps findings.  Positive Max.  Negative crossover.  No gross atrophy.  No palpable masses. Radial pulse 2+. Negative fu's. No barbara peripheral weakness. Deltoid 5/5.      GAIT: not tested     Diagnostic Modalities:  right shoulder X-ray: No fractures or " dislocations.  Glenohumeral joint is well-preserved.  AC joint shows irregularity and narrowing.  Axillary view anteriorly shows some calcification.  Difficult to visualize another imaging.  Independent visualization of the images was performed.      Impression: right shoulder impingement subacromial, calcific tendinitis, bursitis  Questionable new onset cervical radiculopathy    Plan:  All of the above pertinent physical exam and imaging modalities findings was reviewed with Elisabeth and her .                                          CONSERVATIVE CARE:  I recommend conservative care for the patient to include NSAIDs, focused self directed physical therapy, formal physical therapy, steroid injections, activity modifications, rest. Today I provided or dispensed physical therapy.    Did discuss the numbness down the right arm, new onset of only 2 weeks and could be partially related to position of her shoulder and the increasing pain and symptoms.  Recommended the steroid injection to the shoulder and physical therapy to neck and shoulder, if not getting better would consider MRI and MDP. She has no red flag symptoms regarding this and discuss symptoms to get seen right away (loss of b/b, dropping objects, difficulty walking etc).                                            INJECTION PROCEDURE:  The patient was counseled about an  injection, including discussion of risks (including infection), contents of the injection, rationale for performing the injection, and expected benefits of the injection. The skin was prepped with alcohol and betadine and then utilizing sterile technique an injection of the right shoulder subacromial space from the posterolateral approach  was performed. The injection consisted 1ml of Kenalog (40mg per 1 ml) mixed with 3ml of 0.5% Marcaine. The patient tolerated the injection well, and there were no complications. The injection site was covered with a Band-Aid. The injection was  performed by CORAL Coates CNP, DNP        Return to clinic 4-6 , week(s), PRN, or sooner as needed for changes.  Re-x-ray on return: No    Scribed by:  CORAL Coates CNP  9:02 AM  5/17/2021  The information in this document, created by a scribe for me, accurately reflects the services I personally performed and the decisions made by me. I have reviewed and approved this document for accuracy    Maximilian Ochoa DO       Clinic Administered Medication Documentation      Injection Documentation     Injection was administered by provider (please see MAR for given by information). Please see MAR and medication order for additional information.     Site: Joint injection   Medication Used: Kenalog 40mg   Expiration Date:  10/2022      The following medication was given by CORAL Rodrigez CNP, DNP:     MEDICATION: Kenalog 40mg/1ml  ROUTE: Joint Injection  SITE: RIGHT SHOULDER  DOSE: 1 mL  LOT #: [id number]  : [employer]   EXPIRATION DATE:  10/2022  NDC: [address]-1                      Again, thank you for allowing me to participate in the care of your patient.        Sincerely,        Maximilian Ochoa DO     No significant past surgical history    No significant past surgical history    No significant past surgical history

## 2021-05-17 NOTE — PROGRESS NOTES
ORTHOPEDIC CONSULT      Chief Complaint: Elisabeth Voss is a 70 year old female who is being seen for   Chief Complaint   Patient presents with     Shoulder Pain     right shoulder pain     Consult       History of Present Illness:   Previously seen for other issues.  Has been about 3 years since last seen. First itme for her shoulder. Reports about 1 year history of right shoulder pain following putting her dock at her cabin. She does not report a specific incident during placing her dock but noticed her shoulder sore after.  Slowly progressive shoulder pain over the last year. Started as posterior/lateral shoulder pain that has progressed to now including the biceps, trap, and into the neck.  Starting two weeks ago also started to have issues with numbness/tingling radiating down the entire arm into the hand.  This is new. Intermittent.  Denies any dropping of objects, loss of b/b or difficulty walking.    Has pain at rest, with activity, especially with attempting any behind the back or lifting.  Has been struggling with crocheting as well.   Retired.   No previous right shoulder issues. Did have a left shoulder rotator cuff repair about 11 years ago. She states this feels different.     Denies previous neck issues.     Patient's past medical, surgical, social and family histories reviewed.     Past Medical History:   Diagnosis Date     Cancer (H)     melanoma of cheek     History of blood transfusion     with hysterectomy many years ago     Lyme disease      Thyroid disease     partial thyroidectomy 8/2014       Past Surgical History:   Procedure Laterality Date     ARTHROSCOPY KNEE WITH MEDIAL MENISCECTOMY Left 9/20/2016    Procedure: ARTHROSCOPY KNEE WITH MEDIAL MENISCECTOMY;  Surgeon: Maximilian Ochoa DO;  Location: [facility]     BIOPSY NODE SENTINEL N/A 8/26/2014    Procedure: BIOPSY NODE SENTINEL;  Surgeon: Eliza Foss MD;  Location: [facility]     BLEPHAROPLASTY BILATERAL Bilateral 1/11/2018     Procedure: BLEPHAROPLASTY BILATERAL;  Bilateral upper eyelid blepharoplasty;  Surgeon: Jame Painter MD;  Location: [facility]     C TOTAL ABDOM HYSTERECTOMY  11/16/1993    TAHRSO, Rasheed urethropexy     CATARACT IOL, RT/LT Bilateral OD 12/22/16-OS 12/8/16     COLONOSCOPY  3/22/2013    Procedure: COLONOSCOPY;  colonoscopy;  Surgeon: Mike Lr MD;  Location: [facility]     HYSTERECTOMY, PAP NO LONGER INDICATED       ORTHOPEDIC SURGERY      shoulder surgery     PHACOEMULSIFICATION WITH STANDARD INTRAOCULAR LENS IMPLANT Left 12/8/2016    Procedure: PHACOEMULSIFICATION WITH STANDARD INTRAOCULAR LENS IMPLANT;  Surgeon: Jame Painter MD;  Location: [facility]     PHACOEMULSIFICATION WITH STANDARD INTRAOCULAR LENS IMPLANT Right 12/22/2016    Procedure: PHACOEMULSIFICATION WITH STANDARD INTRAOCULAR LENS IMPLANT;  Surgeon: Jame Painter MD;  Location: [facility]     RECONSTRUCT NOSE STAGE ONE N/A 9/5/2014    Procedure: RECONSTRUCT NOSE STAGE ONE;  Surgeon: Levy Holbrook MD;  Location: [facility]     REVISE SCAR FACE N/A 8/26/2014    Procedure: REVISE SCAR FACE;  Surgeon: Eliza Foss MD;  Location: [facility]     THYROIDECTOMY Left 8/26/2014    Procedure: THYROIDECTOMY;  Surgeon: Eliza Foss MD;  Location: [facility]       Medications:  levothyroxine (SYNTHROID/LEVOTHROID) 50 MCG tablet, Take 1 tablet (50 mcg) by mouth daily  NAPROXEN PO, Take 660 mg by mouth 2 times daily as needed for moderate pain  pantoprazole (PROTONIX) 40 MG EC tablet, Take 1 tablet (40 mg) by mouth daily  simvastatin (ZOCOR) 20 MG tablet, One tablet daily    No current facility-administered medications on file prior to visit.       Allergies   Allergen Reactions     No Known Drug Allergies        Social History     Occupational History     Not on file   Tobacco Use     Smoking status: Never Smoker     Smokeless tobacco: Never Used   Substance and Sexual Activity     Alcohol use: Yes     Alcohol/week: 0.0 standard  "drinks     Comment: occasional     Drug use: No     Sexual activity: Yes     Partners: Male     Birth control/protection: Surgical       Family History   Problem Relation Age of Onset     Cancer Mother 45        lung cancer     Cerebrovascular Disease Father         bulge in aorta     Heart Disease Maternal Grandfather      Heart Disease Paternal Grandfather      Cancer Sister         esophageal      Cancer Paternal Grandmother      Cancer Brother 65        Thyroid     Other Cancer Sister         lung       REVIEW OF SYSTEMS  10 point review systems performed otherwise negative as noted as per history of present illness.    Physical Exam:  Vitals: /82   Ht 1.619 m (5' 3.75\")   Wt 89 kg (196 lb 3.2 oz)   LMP  (LMP Unknown)   BMI 33.94 kg/m    BMI= Body mass index is 33.94 kg/m .  Constitutional: healthy, alert and no acute distress   Psychiatric: mentation appears normal and affect normal/bright  NEURO: no focal deficits  RESP: Normal with easy respirations and no use of accessory muscles to breathe, no audible wheezing or retractions  CV: RUE:  no edema         Regular rate and rhythm by palpation  SKIN: No erythema, rashes, excoriation, or breakdown. No evidence of infection.   JOINT/EXTREMITIES:right shoulder: Just some minimal restrictions to terminal forward elevation but otherwise range of motion appears to be within normal limits.  No focal rotator cuff weakness.  No biceps findings.  Positive Max.  Negative crossover.  No gross atrophy.  No palpable masses. Radial pulse 2+. Negative fu's. No barbara peripheral weakness. Deltoid 5/5.      GAIT: not tested     Diagnostic Modalities:  right shoulder X-ray: No fractures or dislocations.  Glenohumeral joint is well-preserved.  AC joint shows irregularity and narrowing.  Axillary view anteriorly shows some calcification.  Difficult to visualize another imaging.  Independent visualization of the images was performed.      Impression: right shoulder " impingement subacromial, calcific tendinitis, bursitis  Questionable new onset cervical radiculopathy    Plan:  All of the above pertinent physical exam and imaging modalities findings was reviewed with Elisabeth and her .                                          CONSERVATIVE CARE:  I recommend conservative care for the patient to include NSAIDs, focused self directed physical therapy, formal physical therapy, steroid injections, activity modifications, rest. Today I provided or dispensed physical therapy.    Did discuss the numbness down the right arm, new onset of only 2 weeks and could be partially related to position of her shoulder and the increasing pain and symptoms.  Recommended the steroid injection to the shoulder and physical therapy to neck and shoulder, if not getting better would consider MRI and MDP. She has no red flag symptoms regarding this and discuss symptoms to get seen right away (loss of b/b, dropping objects, difficulty walking etc).                                            INJECTION PROCEDURE:  The patient was counseled about an  injection, including discussion of risks (including infection), contents of the injection, rationale for performing the injection, and expected benefits of the injection. The skin was prepped with alcohol and betadine and then utilizing sterile technique an injection of the right shoulder subacromial space from the posterolateral approach  was performed. The injection consisted 1ml of Kenalog (40mg per 1 ml) mixed with 3ml of 0.5% Marcaine. The patient tolerated the injection well, and there were no complications. The injection site was covered with a Band-Aid. The injection was performed by CORAL Coates, STEPHANY, CHUY        Return to clinic 4-6 , week(s), PRN, or sooner as needed for changes.  Re-x-ray on return: No    Scribed by:  CORAL Coates CNP  9:02 AM  5/17/2021  The information in this document, created by a scribe for me, accurately reflects  the services I personally performed and the decisions made by me. I have reviewed and approved this document for accuracy    Maximilian Ochoa, DO

## 2021-07-05 DIAGNOSIS — E03.8 OTHER SPECIFIED HYPOTHYROIDISM: ICD-10-CM

## 2021-07-05 DIAGNOSIS — E78.5 HYPERLIPIDEMIA LDL GOAL <160: ICD-10-CM

## 2021-07-05 RX ORDER — SIMVASTATIN 20 MG
TABLET ORAL
Qty: 90 TABLET | Refills: 0 | Status: SHIPPED | OUTPATIENT
Start: 2021-07-05 | End: 2021-10-06

## 2021-07-05 RX ORDER — LEVOTHYROXINE SODIUM 50 UG/1
TABLET ORAL
Qty: 90 TABLET | Refills: 0 | Status: SHIPPED | OUTPATIENT
Start: 2021-07-05 | End: 2021-10-06

## 2021-07-16 ENCOUNTER — LAB (OUTPATIENT)
Dept: LAB | Facility: CLINIC | Age: 71
End: 2021-07-16
Payer: COMMERCIAL

## 2021-07-16 DIAGNOSIS — C43.30 MALIGNANT MELANOMA OF SKIN OF FACE (H): ICD-10-CM

## 2021-07-16 LAB
ALBUMIN SERPL-MCNC: 4 G/DL (ref 3.4–5)
ALP SERPL-CCNC: 67 U/L (ref 40–150)
ALT SERPL W P-5'-P-CCNC: 27 U/L (ref 0–50)
ANION GAP SERPL CALCULATED.3IONS-SCNC: 3 MMOL/L (ref 3–14)
AST SERPL W P-5'-P-CCNC: 24 U/L (ref 0–45)
BASOPHILS # BLD AUTO: 0 10E3/UL (ref 0–0.2)
BASOPHILS NFR BLD AUTO: 1 %
BILIRUB SERPL-MCNC: 0.6 MG/DL (ref 0.2–1.3)
BUN SERPL-MCNC: 14 MG/DL (ref 7–30)
CALCIUM SERPL-MCNC: 9.2 MG/DL (ref 8.5–10.1)
CHLORIDE BLD-SCNC: 111 MMOL/L (ref 94–109)
CO2 SERPL-SCNC: 28 MMOL/L (ref 20–32)
CREAT SERPL-MCNC: 1.01 MG/DL (ref 0.52–1.04)
EOSINOPHIL # BLD AUTO: 0.2 10E3/UL (ref 0–0.7)
EOSINOPHIL NFR BLD AUTO: 3 %
ERYTHROCYTE [DISTWIDTH] IN BLOOD BY AUTOMATED COUNT: 14.2 % (ref 10–15)
GFR SERPL CREATININE-BSD FRML MDRD: 56 ML/MIN/1.73M2
GLUCOSE BLD-MCNC: 97 MG/DL (ref 70–99)
HCT VFR BLD AUTO: 40.7 % (ref 35–47)
HGB BLD-MCNC: 13.3 G/DL (ref 11.7–15.7)
IMM GRANULOCYTES # BLD: 0 10E3/UL
IMM GRANULOCYTES NFR BLD: 0 %
LDH SERPL L TO P-CCNC: 213 U/L (ref 81–234)
LYMPHOCYTES # BLD AUTO: 1.7 10E3/UL (ref 0.8–5.3)
LYMPHOCYTES NFR BLD AUTO: 33 %
MCH RBC QN AUTO: 29.5 PG (ref 26.5–33)
MCHC RBC AUTO-ENTMCNC: 32.7 G/DL (ref 31.5–36.5)
MCV RBC AUTO: 90 FL (ref 78–100)
MONOCYTES # BLD AUTO: 0.4 10E3/UL (ref 0–1.3)
MONOCYTES NFR BLD AUTO: 8 %
NEUTROPHILS # BLD AUTO: 2.8 10E3/UL (ref 1.6–8.3)
NEUTROPHILS NFR BLD AUTO: 55 %
NRBC # BLD AUTO: 0 10E3/UL
NRBC BLD AUTO-RTO: 0 /100
PLATELET # BLD AUTO: 264 10E3/UL (ref 150–450)
POTASSIUM BLD-SCNC: 4.2 MMOL/L (ref 3.4–5.3)
PROT SERPL-MCNC: 7.1 G/DL (ref 6.8–8.8)
RBC # BLD AUTO: 4.51 10E6/UL (ref 3.8–5.2)
SODIUM SERPL-SCNC: 142 MMOL/L (ref 133–144)
WBC # BLD AUTO: 5.2 10E3/UL (ref 4–11)

## 2021-07-16 PROCEDURE — 85025 COMPLETE CBC W/AUTO DIFF WBC: CPT | Performed by: INTERNAL MEDICINE

## 2021-07-16 PROCEDURE — 80053 COMPREHEN METABOLIC PANEL: CPT | Performed by: INTERNAL MEDICINE

## 2021-07-16 PROCEDURE — 83615 LACTATE (LD) (LDH) ENZYME: CPT | Performed by: INTERNAL MEDICINE

## 2021-07-16 PROCEDURE — 36415 COLL VENOUS BLD VENIPUNCTURE: CPT

## 2021-08-12 ENCOUNTER — ONCOLOGY VISIT (OUTPATIENT)
Dept: ONCOLOGY | Facility: CLINIC | Age: 71
End: 2021-08-12
Payer: COMMERCIAL

## 2021-08-12 VITALS
OXYGEN SATURATION: 97 % | SYSTOLIC BLOOD PRESSURE: 122 MMHG | TEMPERATURE: 98.6 F | HEART RATE: 93 BPM | BODY MASS INDEX: 34.08 KG/M2 | WEIGHT: 199.6 LBS | DIASTOLIC BLOOD PRESSURE: 66 MMHG | HEIGHT: 64 IN

## 2021-08-12 DIAGNOSIS — E03.2 HYPOTHYROIDISM DUE TO MEDICATION: ICD-10-CM

## 2021-08-12 DIAGNOSIS — E78.5 HYPERLIPIDEMIA LDL GOAL <160: ICD-10-CM

## 2021-08-12 DIAGNOSIS — C43.30 MALIGNANT MELANOMA OF SKIN OF FACE (H): Primary | ICD-10-CM

## 2021-08-12 PROCEDURE — 99214 OFFICE O/P EST MOD 30 MIN: CPT | Performed by: INTERNAL MEDICINE

## 2021-08-12 RX ORDER — ELECTROLYTES/DEXTROSE
SOLUTION, ORAL ORAL
COMMUNITY
End: 2021-11-17

## 2021-08-12 ASSESSMENT — PAIN SCALES - GENERAL: PAINLEVEL: NO PAIN (0)

## 2021-08-12 ASSESSMENT — MIFFLIN-ST. JEOR: SCORE: 1396.41

## 2021-08-12 NOTE — PROGRESS NOTES
Hematology/ Oncology Follow-up Visit:  Aug 12, 2021    Reason for Visit:   Chief Complaint   Patient presents with     Oncology Clinic Visit     Malignant melanoma of skin of face      Results     labs 07/16/21       Oncologic History:  Malignant melanoma of skin of face (H)  Elisabeth Voss is known malignant melanoma. she initially presented to her primary care provider on 06/26/2014. During that visit, she complained of a left lower jaw hyperpigmented mole that seems to be changing in color and size over the last 6 months. The mole was smaller in size but appeared to go slowly in time and became more hyperpigmented. There is no associated pain or other enlarged local lymphadenopathy. As a result of the patient's concern, she had excision of this lesion on the left lower chin on 06/20/2014, and the pathology revealed invasive nodular malignant melanoma, and the tumor size was 4 mm, and its thickness was 1.1 mm, and Robe level III. There was no ulceration. The margins were negative, but there was in situ melanoma about 0.46 mm from the peripheral margin and then invasive melanoma was present 0.66 mm from the nearest peripheral margin. The deep margin was about 2.5 mm from the tumor. There was no lymphovascular invasion, and the pathologic staging was T1a. She was subsequently seen by Dr. Eliza Foss of surgical oncology in consultation on 07/10/2014, and she is expected to have a followup PET scan. Her staging PET scan done on 07/11/2014 showed hypermetabolic left thyroid nodule measuring 1.5 x 1.2 cm, with SUV of 14.5. There was another peripherally calcified 1.1 cm right thyroid nodule that demonstrated no metabolic activity. No other lymph nodes or other hypermetabolic lesion. The surgical area appears to be well. There was no convincing evidence of malignancy anywhere else. CT of the maxillofacial area also showed no evidence of any subcutaneous mass or adenopathy in the surrounding area. The patient  "continues to feel well and is symptomatic. She is being followed by dermatology and oncology.      Interval History:  Patient is here today for follow-up visit.  She has been feeling well without recent complaints of weight loss or night sweats or fever or chills patient denies any nausea vomiting or diarrhea.  She denies any recent bone aches or pains.  She continues to follow with dermatology every 6 months.    Review Of Systems:  Constitutional: Negative for fever, chills, and night sweats.  Skin: negative.  Eyes: negative.  Ears/Nose/Throat: negative.  Respiratory: No shortness of breath, dyspnea on exertion, cough, or hemoptysis.  Cardiovascular: negative.  Gastrointestinal: negative.  Genitourinary: negative.  Musculoskeletal: negative.  Neurologic: negative.  Psychiatric: negative.  Hematologic/Lymphatic/Immunologic: negative.  Endocrine: negative.    All other ROS negative unless mentioned in interval history.    Past medical, social, surgical, and family histories reviewed.    Allergies:  Allergies as of 08/12/2021 - Reviewed 08/12/2021   Allergen Reaction Noted     No known drug allergies  04/10/2001       Current Medications:  Current Outpatient Medications   Medication Sig Dispense Refill     Ascorbic Acid (VITAMIN C PO)        Cyanocobalamin (VITAMIN B-12 CR PO)        EUTHYROX 50 MCG tablet Take 1 tablet by mouth once daily 90 tablet 0     Multiple Vitamin (MULTIVITAMIN ADULT) TABS        pantoprazole (PROTONIX) 40 MG EC tablet Take 1 tablet (40 mg) by mouth daily 90 tablet 3     simvastatin (ZOCOR) 20 MG tablet Take 1 tablet by mouth once daily 90 tablet 0     NAPROXEN PO Take 660 mg by mouth 2 times daily as needed for moderate pain (Patient not taking: Reported on 8/12/2021)          Physical Exam:  /66   Pulse 93   Temp 98.6  F (37  C) (Temporal)   Ht 1.619 m (5' 3.75\")   Wt 90.5 kg (199 lb 9.6 oz)   LMP  (LMP Unknown)   SpO2 97%   BMI 34.53 kg/m    Wt Readings from Last 12 Encounters: "   08/12/21 90.5 kg (199 lb 9.6 oz)   05/17/21 89 kg (196 lb 3.2 oz)   04/13/21 86.6 kg (191 lb)   09/24/20 87.9 kg (193 lb 12.8 oz)   07/23/20 88.1 kg (194 lb 4.8 oz)   07/20/20 88.1 kg (194 lb 3.2 oz)   06/12/20 87.1 kg (192 lb)   11/18/19 88.5 kg (195 lb)   11/12/19 87.5 kg (193 lb)   11/01/19 87.1 kg (192 lb)   07/16/19 87.7 kg (193 lb 4.8 oz)   03/05/19 87.1 kg (192 lb)     GENERAL APPEARANCE: Healthy, alert and in no acute distress.  HEENT: Sclerae anicteric. PERRLA. Oropharynx without ulcers, lesions, or thrush.  NECK: Supple. No asymmetry or masses.  LYMPHATICS: No palpable cervical, supraclavicular, axillary, or inguinal lymphadenopathy.  RESP: Lungs clear to auscultation bilaterally without rales, rhonchi or wheezes.  CARDIOVASCULAR: Regular rate and rhythm. Normal S1, S2; no S3 or S4. No murmur, gallop, or rub.  ABDOMEN: Soft, nontender. Bowel sounds normal. No palpable organomegaly or masses.  MUSCULOSKELETAL: Extremities without gross deformities noted. No edema of bilateral lower extremities.  SKIN: No suspicious lesions or rashes.  NEURO: Alert and oriented x 3. Cranial nerves II-XII grossly intact.  PSYCHIATRIC: Mentation and affect appear normal.    Laboratory/Imaging Studies:  No visits with results within 2 Week(s) from this visit.   Latest known visit with results is:   Lab on 07/16/2021   Component Date Value Ref Range Status     Lactate Dehydrogenase 07/16/2021 213  81 - 234 U/L Final     Sodium 07/16/2021 142  133 - 144 mmol/L Final     Potassium 07/16/2021 4.2  3.4 - 5.3 mmol/L Final     Chloride 07/16/2021 111* 94 - 109 mmol/L Final     Carbon Dioxide (CO2) 07/16/2021 28  20 - 32 mmol/L Final     Anion Gap 07/16/2021 3  3 - 14 mmol/L Final     Urea Nitrogen 07/16/2021 14  7 - 30 mg/dL Final     Creatinine 07/16/2021 1.01  0.52 - 1.04 mg/dL Final     Calcium 07/16/2021 9.2  8.5 - 10.1 mg/dL Final     Glucose 07/16/2021 97  70 - 99 mg/dL Final     Alkaline Phosphatase 07/16/2021 67  40 - 150  U/L Final     AST 07/16/2021 24  0 - 45 U/L Final     ALT 07/16/2021 27  0 - 50 U/L Final     Protein Total 07/16/2021 7.1  6.8 - 8.8 g/dL Final     Albumin 07/16/2021 4.0  3.4 - 5.0 g/dL Final     Bilirubin Total 07/16/2021 0.6  0.2 - 1.3 mg/dL Final     GFR Estimate 07/16/2021 56* >60 mL/min/1.73m2 Final    As of July 11, 2021, eGFR is calculated by the CKD-EPI creatinine equation, without race adjustment. eGFR can be influenced by muscle mass, exercise, and diet. The reported eGFR is an estimation only and is only applicable if the renal function is stable.     WBC Count 07/16/2021 5.2  4.0 - 11.0 10e3/uL Final     RBC Count 07/16/2021 4.51  3.80 - 5.20 10e6/uL Final     Hemoglobin 07/16/2021 13.3  11.7 - 15.7 g/dL Final     Hematocrit 07/16/2021 40.7  35.0 - 47.0 % Final     MCV 07/16/2021 90  78 - 100 fL Final     MCH 07/16/2021 29.5  26.5 - 33.0 pg Final     MCHC 07/16/2021 32.7  31.5 - 36.5 g/dL Final     RDW 07/16/2021 14.2  10.0 - 15.0 % Final     Platelet Count 07/16/2021 264  150 - 450 10e3/uL Final     % Neutrophils 07/16/2021 55  % Final     % Lymphocytes 07/16/2021 33  % Final     % Monocytes 07/16/2021 8  % Final     % Eosinophils 07/16/2021 3  % Final     % Basophils 07/16/2021 1  % Final     % Immature Granulocytes 07/16/2021 0  % Final     NRBCs per 100 WBC 07/16/2021 0  <1 /100 Final     Absolute Neutrophils 07/16/2021 2.8  1.6 - 8.3 10e3/uL Final     Absolute Lymphocytes 07/16/2021 1.7  0.8 - 5.3 10e3/uL Final     Absolute Monocytes 07/16/2021 0.4  0.0 - 1.3 10e3/uL Final     Absolute Eosinophils 07/16/2021 0.2  0.0 - 0.7 10e3/uL Final     Absolute Basophils 07/16/2021 0.0  0.0 - 0.2 10e3/uL Final     Absolute Immature Granulocytes 07/16/2021 0.0  <=0.0 10e3/uL Final     Absolute NRBCs 07/16/2021 0.0  10e3/uL Final            Assessment and plan:    (C43.30) Malignant melanoma of skin of face (H)  (primary encounter diagnosis)  I reviewed with the patient today most recent laboratory test.  There  is no evidence of malignant melanoma on physical examination or laboratory tests.  We will see the patient again in 1 year time or sooner if there are new developments or concerns.    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    (E78.5) Hyperlipidemia LDL goal <160  Patient currently on simvastatin 20 mg orally daily.      The patient is ready to learn, no apparent learning barriers were identified.  Diagnosis and treatment plans were explained to the patient. The patient expressed understanding of the content. The patient asked appropriate questions. The patient questions were answered to her satisfaction.    Chart documentation with Dragon Voice recognition Software. Although reviewed after completion, some words and grammatical errors may remain.

## 2021-08-12 NOTE — LETTER
"    8/12/2021         RE: Elisabeth Voss  [address] [facility] [address]        Dear Colleague,    Thank you for referring your patient, Elisabeth Voss, to the [facility]. Please see a copy of my visit note below.    Oncology Rooming Note    August 12, 2021 7:35 AM   Elisabeth Voss is a 71 year old female who presents for:    Chief Complaint   Patient presents with     Oncology Clinic Visit     Malignant melanoma of skin of face      Results     labs 07/16/21     Initial Vitals: /66   Pulse 93   Temp 98.6  F (37  C) (Temporal)   Ht 1.619 m (5' 3.75\")   Wt 90.5 kg (199 lb 9.6 oz)   LMP  (LMP Unknown)   SpO2 97%   BMI 34.53 kg/m   Estimated body mass index is 34.53 kg/m  as calculated from the following:    Height as of this encounter: 1.619 m (5' 3.75\").    Weight as of this encounter: 90.5 kg (199 lb 9.6 oz). Body surface area is 2.02 meters squared.  No Pain (0) Comment: Data Unavailable   No LMP recorded (lmp unknown). Patient has had a hysterectomy.  Allergies reviewed: Yes  Medications reviewed: Yes    Medications: Medication refills not needed today.  Pharmacy name entered into [facility]:    [facility] [place], [place] - [address] [facility] [place], [place] - [address]  [facility] [address] [place] [address] [address] [place] [address]    Clinical concerns: Spot on left arm by elbow. Concerns reviewed with Dr. Ravinder Mortensen, [facility]                Hematology/ Oncology Follow-up Visit:  Aug 12, 2021    Reason for Visit:   Chief Complaint   Patient presents with     Oncology Clinic Visit     Malignant melanoma of skin of face      Results     labs 07/16/21       Oncologic History:  Malignant melanoma of skin of face (H)  Elisabeth Voss is known malignant melanoma. she initially presented to her primary care provider on 06/26/2014. During that visit, she complained of a left lower jaw hyperpigmented mole that " seems to be changing in color and size over the last 6 months. The mole was smaller in size but appeared to go slowly in time and became more hyperpigmented. There is no associated pain or other enlarged local lymphadenopathy. As a result of the patient's concern, she had excision of this lesion on the left lower chin on 06/20/2014, and the pathology revealed invasive nodular malignant melanoma, and the tumor size was 4 mm, and its thickness was 1.1 mm, and Robe level III. There was no ulceration. The margins were negative, but there was in situ melanoma about 0.46 mm from the peripheral margin and then invasive melanoma was present 0.66 mm from the nearest peripheral margin. The deep margin was about 2.5 mm from the tumor. There was no lymphovascular invasion, and the pathologic staging was T1a. She was subsequently seen by Dr. Eliza Foss of surgical oncology in consultation on 07/10/2014, and she is expected to have a followup PET scan. Her staging PET scan done on 07/11/2014 showed hypermetabolic left thyroid nodule measuring 1.5 x 1.2 cm, with SUV of 14.5. There was another peripherally calcified 1.1 cm right thyroid nodule that demonstrated no metabolic activity. No other lymph nodes or other hypermetabolic lesion. The surgical area appears to be well. There was no convincing evidence of malignancy anywhere else. CT of the maxillofacial area also showed no evidence of any subcutaneous mass or adenopathy in the surrounding area. The patient continues to feel well and is symptomatic. She is being followed by dermatology and oncology.      Interval History:  Patient is here today for follow-up visit.  She has been feeling well without recent complaints of weight loss or night sweats or fever or chills patient denies any nausea vomiting or diarrhea.  She denies any recent bone aches or pains.  She continues to follow with dermatology every 6 months.    Review Of Systems:  Constitutional: Negative for fever,  "chills, and night sweats.  Skin: negative.  Eyes: negative.  Ears/Nose/Throat: negative.  Respiratory: No shortness of breath, dyspnea on exertion, cough, or hemoptysis.  Cardiovascular: negative.  Gastrointestinal: negative.  Genitourinary: negative.  Musculoskeletal: negative.  Neurologic: negative.  Psychiatric: negative.  Hematologic/Lymphatic/Immunologic: negative.  Endocrine: negative.    All other ROS negative unless mentioned in interval history.    Past medical, social, surgical, and family histories reviewed.    Allergies:  Allergies as of 08/12/2021 - Reviewed 08/12/2021   Allergen Reaction Noted     No known drug allergies  04/10/2001       Current Medications:  Current Outpatient Medications   Medication Sig Dispense Refill     Ascorbic Acid (VITAMIN C PO)        Cyanocobalamin (VITAMIN B-12 CR PO)        EUTHYROX 50 MCG tablet Take 1 tablet by mouth once daily 90 tablet 0     Multiple Vitamin (MULTIVITAMIN ADULT) TABS        pantoprazole (PROTONIX) 40 MG EC tablet Take 1 tablet (40 mg) by mouth daily 90 tablet 3     simvastatin (ZOCOR) 20 MG tablet Take 1 tablet by mouth once daily 90 tablet 0     NAPROXEN PO Take 660 mg by mouth 2 times daily as needed for moderate pain (Patient not taking: Reported on 8/12/2021)          Physical Exam:  /66   Pulse 93   Temp 98.6  F (37  C) (Temporal)   Ht 1.619 m (5' 3.75\")   Wt 90.5 kg (199 lb 9.6 oz)   LMP  (LMP Unknown)   SpO2 97%   BMI 34.53 kg/m    Wt Readings from Last 12 Encounters:   08/12/21 90.5 kg (199 lb 9.6 oz)   05/17/21 89 kg (196 lb 3.2 oz)   04/13/21 86.6 kg (191 lb)   09/24/20 87.9 kg (193 lb 12.8 oz)   07/23/20 88.1 kg (194 lb 4.8 oz)   07/20/20 88.1 kg (194 lb 3.2 oz)   06/12/20 87.1 kg (192 lb)   11/18/19 88.5 kg (195 lb)   11/12/19 87.5 kg (193 lb)   11/01/19 87.1 kg (192 lb)   07/16/19 87.7 kg (193 lb 4.8 oz)   03/05/19 87.1 kg (192 lb)     GENERAL APPEARANCE: Healthy, alert and in no acute distress.  HEENT: Sclerae anicteric. " PERRLA. Oropharynx without ulcers, lesions, or thrush.  NECK: Supple. No asymmetry or masses.  LYMPHATICS: No palpable cervical, supraclavicular, axillary, or inguinal lymphadenopathy.  RESP: Lungs clear to auscultation bilaterally without rales, rhonchi or wheezes.  CARDIOVASCULAR: Regular rate and rhythm. Normal S1, S2; no S3 or S4. No murmur, gallop, or rub.  ABDOMEN: Soft, nontender. Bowel sounds normal. No palpable organomegaly or masses.  MUSCULOSKELETAL: Extremities without gross deformities noted. No edema of bilateral lower extremities.  SKIN: No suspicious lesions or rashes.  NEURO: Alert and oriented x 3. Cranial nerves II-XII grossly intact.  PSYCHIATRIC: Mentation and affect appear normal.    Laboratory/Imaging Studies:  No visits with results within 2 Week(s) from this visit.   Latest known visit with results is:   Lab on 07/16/2021   Component Date Value Ref Range Status     Lactate Dehydrogenase 07/16/2021 213  81 - 234 U/L Final     Sodium 07/16/2021 142  133 - 144 mmol/L Final     Potassium 07/16/2021 4.2  3.4 - 5.3 mmol/L Final     Chloride 07/16/2021 111* 94 - 109 mmol/L Final     Carbon Dioxide (CO2) 07/16/2021 28  20 - 32 mmol/L Final     Anion Gap 07/16/2021 3  3 - 14 mmol/L Final     Urea Nitrogen 07/16/2021 14  7 - 30 mg/dL Final     Creatinine 07/16/2021 1.01  0.52 - 1.04 mg/dL Final     Calcium 07/16/2021 9.2  8.5 - 10.1 mg/dL Final     Glucose 07/16/2021 97  70 - 99 mg/dL Final     Alkaline Phosphatase 07/16/2021 67  40 - 150 U/L Final     AST 07/16/2021 24  0 - 45 U/L Final     ALT 07/16/2021 27  0 - 50 U/L Final     Protein Total 07/16/2021 7.1  6.8 - 8.8 g/dL Final     Albumin 07/16/2021 4.0  3.4 - 5.0 g/dL Final     Bilirubin Total 07/16/2021 0.6  0.2 - 1.3 mg/dL Final     GFR Estimate 07/16/2021 56* >60 mL/min/1.73m2 Final    As of July 11, 2021, eGFR is calculated by the CKD-EPI creatinine equation, without race adjustment. eGFR can be influenced by muscle mass, exercise, and diet.  The reported eGFR is an estimation only and is only applicable if the renal function is stable.     WBC Count 07/16/2021 5.2  4.0 - 11.0 10e3/uL Final     RBC Count 07/16/2021 4.51  3.80 - 5.20 10e6/uL Final     Hemoglobin 07/16/2021 13.3  11.7 - 15.7 g/dL Final     Hematocrit 07/16/2021 40.7  35.0 - 47.0 % Final     MCV 07/16/2021 90  78 - 100 fL Final     MCH 07/16/2021 29.5  26.5 - 33.0 pg Final     MCHC 07/16/2021 32.7  31.5 - 36.5 g/dL Final     RDW 07/16/2021 14.2  10.0 - 15.0 % Final     Platelet Count 07/16/2021 264  150 - 450 10e3/uL Final     % Neutrophils 07/16/2021 55  % Final     % Lymphocytes 07/16/2021 33  % Final     % Monocytes 07/16/2021 8  % Final     % Eosinophils 07/16/2021 3  % Final     % Basophils 07/16/2021 1  % Final     % Immature Granulocytes 07/16/2021 0  % Final     NRBCs per 100 WBC 07/16/2021 0  <1 /100 Final     Absolute Neutrophils 07/16/2021 2.8  1.6 - 8.3 10e3/uL Final     Absolute Lymphocytes 07/16/2021 1.7  0.8 - 5.3 10e3/uL Final     Absolute Monocytes 07/16/2021 0.4  0.0 - 1.3 10e3/uL Final     Absolute Eosinophils 07/16/2021 0.2  0.0 - 0.7 10e3/uL Final     Absolute Basophils 07/16/2021 0.0  0.0 - 0.2 10e3/uL Final     Absolute Immature Granulocytes 07/16/2021 0.0  <=0.0 10e3/uL Final     Absolute NRBCs 07/16/2021 0.0  10e3/uL Final            Assessment and plan:    (C43.30) Malignant melanoma of skin of face (H)  (primary encounter diagnosis)  I reviewed with the patient today most recent laboratory test.  There is no evidence of malignant melanoma on physical examination or laboratory tests.  We will see the patient again in 1 year time or sooner if there are new developments or concerns.    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    (E78.5) Hyperlipidemia LDL goal <160  Patient currently on simvastatin 20 mg orally daily.      The patient is ready to learn, no apparent learning barriers were identified.  Diagnosis and  treatment plans were explained to the patient. The patient expressed understanding of the content. The patient asked appropriate questions. The patient questions were answered to her satisfaction.    Chart documentation with Dragon Voice recognition Software. Although reviewed after completion, some words and grammatical errors may remain.      Again, thank you for allowing me to participate in the care of your patient.        Sincerely,        Dorothy Castellon MD

## 2021-08-12 NOTE — PATIENT INSTRUCTIONS
Follow-up in 1 year with laboratory tests    Today:  Plan to follow up in 1 year.    Please follow up with Maria Del Rosario Maya CNP.  Please schedule labs 1-3 days prior to follow up appointment.    Lab Date/Time: 8/15/22 @9:00am    Office visit follow up with Maria Del Rosario Maya CNP Date/Time: 8/16/22 @2:30pm    If you have any questions or concerns please feel free to call.    If you need to reschedule please call:  Clinic or Lab Appointment - [phone]  Infusion - [phone]  Imaging - [phone]    Manisha Mortensen [facility]  [phone]

## 2021-08-12 NOTE — PROGRESS NOTES
"Oncology Rooming Note    August 12, 2021 7:35 AM   Elisabeth Voss is a 71 year old female who presents for:    Chief Complaint   Patient presents with     Oncology Clinic Visit     Malignant melanoma of skin of face      Results     labs 07/16/21     Initial Vitals: /66   Pulse 93   Temp 98.6  F (37  C) (Temporal)   Ht 1.619 m (5' 3.75\")   Wt 90.5 kg (199 lb 9.6 oz)   LMP  (LMP Unknown)   SpO2 97%   BMI 34.53 kg/m   Estimated body mass index is 34.53 kg/m  as calculated from the following:    Height as of this encounter: 1.619 m (5' 3.75\").    Weight as of this encounter: 90.5 kg (199 lb 9.6 oz). Body surface area is 2.02 meters squared.  No Pain (0) Comment: Data Unavailable   No LMP recorded (lmp unknown). Patient has had a hysterectomy.  Allergies reviewed: Yes  Medications reviewed: Yes    Medications: Medication refills not needed today.  Pharmacy name entered into [facility]:    [facility] [address] [place], [place] - [address] [place] [address] PHARMACY [facility] [address]  [facility] [address] [facility] [address] [facility]    Clinical concerns: Spot on left arm by elbow. Concerns reviewed with Dr. Ravinder Mortensen, [facility]              "

## 2021-10-04 DIAGNOSIS — E78.5 HYPERLIPIDEMIA LDL GOAL <160: ICD-10-CM

## 2021-10-04 DIAGNOSIS — E03.8 OTHER SPECIFIED HYPOTHYROIDISM: ICD-10-CM

## 2021-10-06 RX ORDER — SIMVASTATIN 20 MG
TABLET ORAL
Qty: 90 TABLET | Refills: 1 | Status: SHIPPED | OUTPATIENT
Start: 2021-10-06 | End: 2022-04-05

## 2021-10-06 RX ORDER — LEVOTHYROXINE SODIUM 50 UG/1
TABLET ORAL
Qty: 90 TABLET | Refills: 1 | Status: SHIPPED | OUTPATIENT
Start: 2021-10-06 | End: 2022-04-05

## 2021-10-06 NOTE — TELEPHONE ENCOUNTER
Prescription approved per [facility] Refill Protocol.    TRACI ArtN, RN  [facility]

## 2021-10-23 ENCOUNTER — HEALTH MAINTENANCE LETTER (OUTPATIENT)
Age: 71
End: 2021-10-23

## 2021-11-17 ENCOUNTER — OFFICE VISIT (OUTPATIENT)
Dept: ORTHOPEDICS | Facility: CLINIC | Age: 71
End: 2021-11-17
Payer: COMMERCIAL

## 2021-11-17 ENCOUNTER — ANCILLARY PROCEDURE (OUTPATIENT)
Dept: GENERAL RADIOLOGY | Facility: CLINIC | Age: 71
End: 2021-11-17
Attending: ORTHOPAEDIC SURGERY
Payer: COMMERCIAL

## 2021-11-17 VITALS
BODY MASS INDEX: 33.55 KG/M2 | SYSTOLIC BLOOD PRESSURE: 112 MMHG | HEIGHT: 64 IN | WEIGHT: 196.5 LBS | DIASTOLIC BLOOD PRESSURE: 76 MMHG

## 2021-11-17 DIAGNOSIS — M79.642 LEFT HAND PAIN: ICD-10-CM

## 2021-11-17 DIAGNOSIS — M18.12 PRIMARY OSTEOARTHRITIS OF FIRST CARPOMETACARPAL JOINT OF LEFT HAND: Primary | ICD-10-CM

## 2021-11-17 PROCEDURE — 99213 OFFICE O/P EST LOW 20 MIN: CPT | Performed by: ORTHOPAEDIC SURGERY

## 2021-11-17 PROCEDURE — 73130 X-RAY EXAM OF HAND: CPT | Mod: TC | Performed by: RADIOLOGY

## 2021-11-17 ASSESSMENT — MIFFLIN-ST. JEOR: SCORE: 1382.43

## 2021-11-17 ASSESSMENT — PAIN SCALES - GENERAL: PAINLEVEL: SEVERE PAIN (7)

## 2021-11-17 NOTE — LETTER
11/17/2021         RE: Elisabeth Voss  [address] [facility] [address]        Dear Colleague,    Thank you for referring your patient, Elisabeth Voss, to the [facility]. Please see a copy of my visit note below.    Office Visit-Follow up    Chief Complaint: Elisabeth Voss is a 71 year old female who is being seen for   Chief Complaint   Patient presents with     Musculoskeletal Problem     left thumb pain       History of Present Illness:   Today's visit:  Complains of years worth of left thumb pain.  Nonradiating.  It bothers her when she .  She did have injections couple years ago which seemed to help at one point.  She did some hand therapy of thumb spica brace.  She felt it was more related to her job at that time.  However is now retired.  She has issues with knitting and crocheting.  No numbness and tingling        May 17, 2021 visit:  Previously seen for other issues.  Has been about 3 years since last seen. First itme for her shoulder. Reports about 1 year history of right shoulder pain following putting her dock [place] cabin. She does not report a specific incident during placing her dock but noticed her shoulder sore after.  Slowly progressive shoulder pain over the last year. Started as posterior/lateral shoulder pain that has progressed to now including the biceps, trap, and into the neck.  Starting two weeks ago also started to have issues with numbness/tingling radiating down the entire arm into the hand.  This is new. Intermittent.  Denies any dropping of objects, loss of b/b or difficulty walking.    Has pain at rest, with activity, especially with attempting any behind the back or lifting.  Has been struggling with crocheting as well.   Retired.   No previous right shoulder issues. Did have a left shoulder rotator cuff repair about 11 years ago. She states this feels different.      Denies previous neck issues.        Social History  "    Occupational History     Not on file   Tobacco Use     Smoking status: Never Smoker     Smokeless tobacco: Never Used   Substance and Sexual Activity     Alcohol use: Yes     Alcohol/week: 0.0 standard drinks     Comment: occasional     Drug use: No     Sexual activity: Yes     Partners: Male     Birth control/protection: Surgical       REVIEW OF SYSTEMS  General: negative for, night sweats, dizziness, fatigue  Resp: No shortness of breath and no cough  CV: negative for chest pain, syncope or near-syncope  GI: negative for nausea, vomiting and diarrhea  : negative for dysuria and hematuria  Musculoskeletal: as above  Neurologic: negative for syncope   Hematologic: negative for bleeding disorder    Physical Exam:  Vitals: /76   Ht 1.62 m (5' 3.76\")   Wt 89.1 kg (196 lb 8 oz)   LMP  (LMP Unknown)   BMI 33.99 kg/m    BMI= Body mass index is 33.99 kg/m .  Constitutional: healthy, alert and no acute distress   Psychiatric: mentation appears normal and affect normal/bright  NEURO: no focal deficits  RESP: Normal with easy respirations and no use of accessory muscles to breathe, no audible wheezing or retractions  CV: LUE:   no edema         Regular rate and rhythm by palpation  SKIN: No erythema, rashes, excoriation, or breakdown. No evidence of infection.   JOINT/EXTREMITIES:left hand: No gross deformity.  Tenderness along the first CMC joint.  Positive grind test.  She has near full motion including with opposition.  There is no swelling or soft tissue changes.  Flexors and extensors are intact.  GAIT: not tested             Diagnostic Modalities:  left hand X-ray: Advanced degenerative changes first CMC joint with complete loss of articular cartilage subchondral sclerosis and some rimming osteophytes.  DIP joints also show some degenerative changes well.  Independent visualization of the images was performed.      Impression: left thumb CMC osteoarthritis    Plan:  All of the above pertinent physical " exam and imaging modalities findings was reviewed with Elisabeth and her .    Just had a Covid injection yesterday.  Per her request she would like an injection.  Plan to have her come back in 2 weeks due to the Covid vaccination.      Return to clinic 2, week(s), or sooner as needed for changes.  Re-x-ray on return: No    Britton Ochoa D.O.            Again, thank you for allowing me to participate in the care of your patient.        Sincerely,        Maximilian Ochoa, DO

## 2021-11-17 NOTE — PROGRESS NOTES
Office Visit-Follow up    Chief Complaint: Elisabeth Voss is a 71 year old female who is being seen for   Chief Complaint   Patient presents with     Musculoskeletal Problem     left thumb pain       History of Present Illness:   Today's visit:  Complains of years worth of left thumb pain.  Nonradiating.  It bothers her when she .  She did have injections couple years ago which seemed to help at one point.  She did some hand therapy of thumb spica brace.  She felt it was more related to her job at that time.  However is now retired.  She has issues with knitting and crocheting.  No numbness and tingling        May 17, 2021 visit:  Previously seen for other issues.  Has been about 3 years since last seen. First itme for her shoulder. Reports about 1 year history of right shoulder pain following putting her dock at her cabin. She does not report a specific incident during placing her dock but noticed her shoulder sore after.  Slowly progressive shoulder pain over the last year. Started as posterior/lateral shoulder pain that has progressed to now including the biceps, trap, and into the neck.  Starting two weeks ago also started to have issues with numbness/tingling radiating down the entire arm into the hand.  This is new. Intermittent.  Denies any dropping of objects, loss of b/b or difficulty walking.    Has pain at rest, with activity, especially with attempting any behind the back or lifting.  Has been struggling with crocheting as well.   Retired.   No previous right shoulder issues. Did have a left shoulder rotator cuff repair about 11 years ago. She states this feels different.      Denies previous neck issues.        Social History     Occupational History     Not on file   Tobacco Use     Smoking status: Never Smoker     Smokeless tobacco: Never Used   Substance and Sexual Activity     Alcohol use: Yes     Alcohol/week: 0.0 standard drinks     Comment: occasional     Drug use: No     Sexual  "activity: Yes     Partners: Male     Birth control/protection: Surgical       REVIEW OF SYSTEMS  General: negative for, night sweats, dizziness, fatigue  Resp: No shortness of breath and no cough  CV: negative for chest pain, syncope or near-syncope  GI: negative for nausea, vomiting and diarrhea  : negative for dysuria and hematuria  Musculoskeletal: as above  Neurologic: negative for syncope   Hematologic: negative for bleeding disorder    Physical Exam:  Vitals: /76   Ht 1.62 m (5' 3.76\")   Wt 89.1 kg (196 lb 8 oz)   LMP  (LMP Unknown)   BMI 33.99 kg/m    BMI= Body mass index is 33.99 kg/m .  Constitutional: healthy, alert and no acute distress   Psychiatric: mentation appears normal and affect normal/bright  NEURO: no focal deficits  RESP: Normal with easy respirations and no use of accessory muscles to breathe, no audible wheezing or retractions  CV: LUE:   no edema         Regular rate and rhythm by palpation  SKIN: No erythema, rashes, excoriation, or breakdown. No evidence of infection.   JOINT/EXTREMITIES:left hand: No gross deformity.  Tenderness along the first CMC joint.  Positive grind test.  She has near full motion including with opposition.  There is no swelling or soft tissue changes.  Flexors and extensors are intact.  GAIT: not tested             Diagnostic Modalities:  left hand X-ray: Advanced degenerative changes first CMC joint with complete loss of articular cartilage subchondral sclerosis and some rimming osteophytes.  DIP joints also show some degenerative changes well.  Independent visualization of the images was performed.      Impression: left thumb CMC osteoarthritis    Plan:  All of the above pertinent physical exam and imaging modalities findings was reviewed with Elisabeth and her .    Just had a Covid injection yesterday.  Per her request she would like an injection.  Plan to have her come back in 2 weeks due to the Covid vaccination.      Return to clinic 2, " week(s), or sooner as needed for changes.  Re-x-ray on return: Cathleen Ochoa D.O.

## 2021-12-01 ENCOUNTER — OFFICE VISIT (OUTPATIENT)
Dept: ORTHOPEDICS | Facility: CLINIC | Age: 71
End: 2021-12-01
Payer: COMMERCIAL

## 2021-12-01 DIAGNOSIS — M18.12 PRIMARY OSTEOARTHRITIS OF FIRST CARPOMETACARPAL JOINT OF LEFT HAND: Primary | ICD-10-CM

## 2021-12-01 PROCEDURE — 20600 DRAIN/INJ JOINT/BURSA W/O US: CPT | Mod: FA | Performed by: NURSE PRACTITIONER

## 2021-12-01 RX ORDER — BETAMETHASONE SODIUM PHOSPHATE AND BETAMETHASONE ACETATE 3; 3 MG/ML; MG/ML
6 INJECTION, SUSPENSION INTRA-ARTICULAR; INTRALESIONAL; INTRAMUSCULAR; SOFT TISSUE ONCE
Status: COMPLETED | OUTPATIENT
Start: 2021-12-01 | End: 2021-12-01

## 2021-12-01 RX ORDER — BUPIVACAINE HYDROCHLORIDE 5 MG/ML
1 INJECTION, SOLUTION PERINEURAL ONCE
Status: COMPLETED | OUTPATIENT
Start: 2021-12-01 | End: 2021-12-01

## 2021-12-01 RX ADMIN — BUPIVACAINE HYDROCHLORIDE 5 MG: 5 INJECTION, SOLUTION PERINEURAL at 07:50

## 2021-12-01 RX ADMIN — BETAMETHASONE SODIUM PHOSPHATE AND BETAMETHASONE ACETATE 6 MG: 3; 3 INJECTION, SUSPENSION INTRA-ARTICULAR; INTRALESIONAL; INTRAMUSCULAR; SOFT TISSUE at 07:50

## 2021-12-01 NOTE — PROGRESS NOTES
Recently seen for left hand/thumb CMC OA. Was recommended for an injection. Unable to get at that time due to recent COVID shot. Returns today for injection.  No interval change.  See visit from 11/17/21 for more details.    SKIN: no evidence of infection or wound breakdown. Skin is intact.   JOINT/EXTREMITIES:left hand: No gross deformity.  Tenderness along the first CMC joint.  Positive grind test.  She has near full motion including with opposition.    GAIT: not tested     The patient was counseled about an  injection, including discussion of risks (including infection), contents of the injection, rationale for performing the injection, and expected benefits of the injection. The skin was prepped with alcohol and betadine and then utilizing sterile technique an injection of the left thumb at the CMC joint was performed. The injection consisted 1ml of Betamethasone (30mg per 5 ml) mixed with 1 ml of 0.5% Marcaine. The patient tolerated the injection well, and there were no complications. The injection site was covered with a Band-Aid. The injection was performed by CORAL Coates, CNP, DNP      If any concerns for infection seek immediate medical evaluation either in the clinic or the ED.     CORAL Rodrigez, CNP  Orthopedic Surgery

## 2021-12-01 NOTE — LETTER
12/1/2021         RE: Elisabeth Voss  [address]  [place] [address]        Dear Colleague,    Thank you for referring your patient, Elisabeth Voss, to the [facility]. Please see a copy of my visit note below.    Recently seen for left hand/thumb CMC OA. Was recommended for an injection. Unable to get at that time due to recent COVID shot. Returns today for injection.  No interval change.  See visit from 11/17/21 for more details.    JOINT/EXTREMITIES:left hand: No gross deformity.  Tenderness along the first CMC joint.  Positive grind test.  She has near full motion including with opposition.  There is no swelling or soft tissue changes.  Flexors and extensors are intact.  GAIT: not tested     The patient was counseled about an  injection, including discussion of risks (including infection), contents of the injection, rationale for performing the injection, and expected benefits of the injection. The skin was prepped with alcohol and betadine and then utilizing sterile technique an injection of the left thumb at the CMC joint was performed. The injection consisted 1ml of Betamethasone (30mg per 5 ml) mixed with 1 ml of 0.5% Marcaine. The patient tolerated the injection well, and there were no complications. The injection site was covered with a Band-Aid. The injection was performed by CORAL Coates, CNP, DNP      If any concerns for infection seek immediate medical evaluation either in the clinic or the ED.     CORAL Rodrigez, CNP  Orthopedic Surgery          Again, thank you for allowing me to participate in the care of your patient.        Sincerely,        Maximilian Ochoa, DO

## 2021-12-03 ENCOUNTER — OFFICE VISIT (OUTPATIENT)
Dept: FAMILY MEDICINE | Facility: CLINIC | Age: 71
End: 2021-12-03
Payer: COMMERCIAL

## 2021-12-03 VITALS
SYSTOLIC BLOOD PRESSURE: 132 MMHG | TEMPERATURE: 97.4 F | BODY MASS INDEX: 33.98 KG/M2 | OXYGEN SATURATION: 96 % | WEIGHT: 196.44 LBS | DIASTOLIC BLOOD PRESSURE: 86 MMHG | HEART RATE: 66 BPM | RESPIRATION RATE: 18 BRPM

## 2021-12-03 DIAGNOSIS — S00.521A BLISTER OF LIP WITH INFECTION, INITIAL ENCOUNTER: Primary | ICD-10-CM

## 2021-12-03 DIAGNOSIS — L08.9 BLISTER OF LIP WITH INFECTION, INITIAL ENCOUNTER: Primary | ICD-10-CM

## 2021-12-03 PROCEDURE — 99213 OFFICE O/P EST LOW 20 MIN: CPT | Performed by: FAMILY MEDICINE

## 2021-12-03 RX ORDER — CEPHALEXIN 500 MG/1
500 CAPSULE ORAL 3 TIMES DAILY
Qty: 30 CAPSULE | Refills: 0 | Status: SHIPPED | OUTPATIENT
Start: 2021-12-03 | End: 2022-01-24

## 2021-12-03 RX ORDER — MUPIROCIN 20 MG/G
OINTMENT TOPICAL 3 TIMES DAILY
Qty: 22 G | Refills: 0 | Status: SHIPPED | OUTPATIENT
Start: 2021-12-03 | End: 2022-01-24

## 2021-12-03 ASSESSMENT — PAIN SCALES - GENERAL: PAINLEVEL: MODERATE PAIN (5)

## 2021-12-03 NOTE — PROGRESS NOTES
"  Assessment & Plan     Blister of lip with infection, initial encounter  She bit her lip a week ago.  Has had a reaction and now she feels some puffiness in her face feels she has an infection.  We will treat her with Keflex and some Bactroban on the outer part of the lip.  Follow-up if not improving.  Also has a lesion on her left face in the temporal area.  We will have her see dermatology for this.  - cephALEXin (KEFLEX) 500 MG capsule; Take 1 capsule (500 mg) by mouth 3 times daily  - mupirocin (BACTROBAN) 2 % external ointment; Apply topically 3 times daily             BMI:   Estimated body mass index is 33.98 kg/m  as calculated from the following:    Height as of 11/17/21: 1.62 m (5' 3.76\").    Weight as of this encounter: 89.1 kg (196 lb 7 oz).           No follow-ups on file.    Gerry Gaona MD  [facility]    Shantel Valadez is a 71 year old who presents for the following health issues : Bit her lower lip midline Thanksgiving a week ago.  Its been a little bit swollen little bit tender and now some crusting on there and she feels some redness and warmth in her cheek.  Is worried that she has an infection.    History of Present Illness       She eats 2-3 servings of fruits and vegetables daily.She consumes 0 sweetened beverage(s) daily.She exercises with enough effort to increase her heart rate 9 or less minutes per day.  She exercises with enough effort to increase her heart rate 3 or less days per week.   She is taking medications regularly.       Concern - facial swelling  Onset: 11/25/2021 bit lip and is getting worse  Description: lip swelling, left side face red, swelling, numbness in face, nose, hurts down into neck and ear.   Intensity: moderate  Progression of Symptoms:  worsening  Accompanying Signs & Symptoms: lip infected, skin warm to touch  Previous history of similar problem: none  Precipitating factors:        Worsened by: none  Alleviating factors:        " Improved by: none  Therapies tried and outcome: tylenol no relief.         Review of Systems   Constitutional, HEENT, cardiovascular, pulmonary, gi and gu systems are negative, except as otherwise noted.      Objective    /86 (BP Location: Right arm, Patient Position: Chair, Cuff Size: Adult Regular)   Pulse 66   Temp 97.4  F (36.3  C) (Temporal)   Resp 18   Wt 89.1 kg (196 lb 7 oz)   LMP  (LMP Unknown)   SpO2 96%   BMI 33.98 kg/m    Body mass index is 33.98 kg/m .  Physical Exam   GENERAL: healthy, alert and no distress  EYES: Eyes grossly normal to inspection, PERRL and conjunctivae and sclerae normal  HENT: ear canals and TM's normal, nose and mouth without ulcers or lesions  HENT: normal cephalic/atraumatic, oropharynx clear, oral mucous membranes moist and lower lip midline a little bit puffy and some leukoplakia from healing wound.  Little tender in the area.  NECK: no adenopathy, no asymmetry, masses, or scars and thyroid normal to palpation  MS: no gross musculoskeletal defects noted, no edema  SKIN: Left side of her cheek is a little bit sona.  Not particularly warm.  And she has a lesion raised about 3 mm somewhat firm in the left temporal area.  PSYCH: mentation appears normal, affect normal/bright

## 2022-01-19 ENCOUNTER — TELEPHONE (OUTPATIENT)
Dept: ORTHOPEDICS | Facility: OTHER | Age: 72
End: 2022-01-19

## 2022-01-19 ENCOUNTER — OFFICE VISIT (OUTPATIENT)
Dept: ORTHOPEDICS | Facility: CLINIC | Age: 72
End: 2022-01-19
Payer: COMMERCIAL

## 2022-01-19 VITALS
HEIGHT: 64 IN | WEIGHT: 202 LBS | BODY MASS INDEX: 34.49 KG/M2 | DIASTOLIC BLOOD PRESSURE: 72 MMHG | SYSTOLIC BLOOD PRESSURE: 120 MMHG

## 2022-01-19 DIAGNOSIS — M18.12 PRIMARY OSTEOARTHRITIS OF FIRST CARPOMETACARPAL JOINT OF LEFT HAND: Primary | ICD-10-CM

## 2022-01-19 PROCEDURE — 99214 OFFICE O/P EST MOD 30 MIN: CPT | Performed by: ORTHOPAEDIC SURGERY

## 2022-01-19 ASSESSMENT — MIFFLIN-ST. JEOR: SCORE: 1411.27

## 2022-01-19 NOTE — TELEPHONE ENCOUNTER
Patient needs a preop physical prior to 1/31 surgery.  She would like to see Dr. Gaona and no one else. Is he able to work in?  Please call patient with date/time or let me know.    Thank you

## 2022-01-19 NOTE — TELEPHONE ENCOUNTER
Reason for Call:  Other call back    Detailed comments: pt wanting to let paul know that she cancelled eye dr appt for the day following the procedure- please call pt with any questions    Phone Number Patient can be reached at: Home number on file [phone] (home)    Best Time: any    Can we leave a detailed message on this number? YES    Call taken on 1/19/2022 at 3:28 PM by Lena House

## 2022-01-19 NOTE — LETTER
1/19/2022         RE: Elisabeth Voss  [address] [facility] [address]        Dear Colleague,    Thank you for referring your patient, Elisabeth Voss, to the [facility]. Please see a copy of my visit note below.    Office Visit-Follow up    Chief Complaint: Elisabeth Voss is a 71 year old female who is being seen for   Chief Complaint   Patient presents with     RECHECK     left thumb CMC osteoarthritis       History of Present Illness:   Today's visit:  Here with .  Unfortunately injection provided no relief.  Complains of nearly constant pain.  She is dropping objects.  She has difficulty opening jars.  She is now attempted rest, activity modification, steroid injection, bracing, therapy with no improvement.  She has pain at rest.  Pain that stops her from doing normal activities of daily living  December 1, 2021 visit:  Recently seen for left hand/thumb CMC OA. Was recommended for an injection. Unable to get at that time due to recent COVID shot. Returns today for injection.  No interval change.  See visit from 11/17/21 for more details.    November 17, 2021 visit:  Complains of years worth of left thumb pain.  Nonradiating.  It bothers her when she .  She did have injections couple years ago which seemed to help at one point.  She did some hand therapy of thumb spica brace.  She felt it was more related to her job at that time.  However is now retired.  She has issues with knitting and crocheting.  No numbness and tingling           May 17, 2021 visit:  Previously seen for other issues.  Has been about 3 years since last seen. First itme for her shoulder. Reports about 1 year history of right shoulder pain following putting her dock at her cabin. She does not report a specific incident during placing her dock but noticed her shoulder sore after.  Slowly progressive shoulder pain over the last year. Started as posterior/lateral shoulder pain that  "has progressed to now including the biceps, trap, and into the neck.  Starting two weeks ago also started to have issues with numbness/tingling radiating down the entire arm into the hand.  This is new. Intermittent.  Denies any dropping of objects, loss of b/b or difficulty walking.    Has pain at rest, with activity, especially with attempting any behind the back or lifting.  Has been struggling with crocheting as well.   Retired.   No previous right shoulder issues. Did have a left shoulder rotator cuff repair about 11 years ago. She states this feels different.      Denies previous neck issues.     Social History     Occupational History     Not on file   Tobacco Use     Smoking status: Never Smoker     Smokeless tobacco: Never Used   Vaping Use     Vaping Use: Never used   Substance and Sexual Activity     Alcohol use: Yes     Alcohol/week: 0.0 standard drinks     Comment: occasional     Drug use: No     Sexual activity: Yes     Partners: Male     Birth control/protection: Surgical           Physical Exam:  Vitals: /72   Ht 1.626 m (5' 4\")   Wt 91.6 kg (202 lb)   LMP  (LMP Unknown)   BMI 34.67 kg/m    BMI= Body mass index is 34.67 kg/m .  Constitutional: healthy, alert and no acute distress   Psychiatric: mentation appears normal and affect normal/bright  NEURO: no focal deficits  RESP: Normal with easy respirations and no use of accessory muscles to breathe, no audible wheezing or retractions  CV: LUE:   no edema         Regular rate and rhythm by palpation  SKIN: No erythema, rashes, excoriation, or breakdown. No evidence of infection.   JOINT/EXTREMITIES:left hand: No gross deformity.  There is exquisite tenderness at the first CMC joint.  Positive grind test.  No evidence infection.  Fingers are warm and dry with good capillary refill.  GAIT: not tested             Diagnostic Modalities:  Previous imaging reviewed.  Independent visualization of the images was performed.      Impression: left thumb " [facility] primary osteoarthritis    Plan:  All of the above pertinent physical exam and imaging modalities findings was reviewed with Elisabeth and her .    Treatment options discussed.  Radiographs are clinically correlated.  Longstanding progressive pain.  Is been refractory to rest, activity modification, bracing, therapy, steroid injections, anti-inflammatories.  She has pain with basic activities.  She is dropping objects.  Given her continued symptoms refractory to conservative care we discussed proceeding with a trapezium excision with suture suspension.  The risk, benefits, complications discussed risk including bleeding, infection, artery, nerve injury, fracture reviewed.  Postoperative timeframe for recovery discussed once reviewed she opted proceed.  General anesthesia.  Same-day surgery.    I did discuss my departure from [place].  She understands.    Return to clinic 10 days post-operatively. , or sooner as needed for changes.  Re-x-ray on return: No    Britton Ochoa D.O.            Again, thank you for allowing me to participate in the care of your patient.        Sincerely,        Maximilian Ochoa, DO

## 2022-01-19 NOTE — PROGRESS NOTES
Office Visit-Follow up    Chief Complaint: Elisabeth Voss is a 71 year old female who is being seen for   Chief Complaint   Patient presents with     RECHECK     left thumb CMC osteoarthritis       History of Present Illness:   Today's visit:  Here with .  Unfortunately injection provided no relief.  Complains of nearly constant pain.  She is dropping objects.  She has difficulty opening jars.  She is now attempted rest, activity modification, steroid injection, bracing, therapy with no improvement.  She has pain at rest.  Pain that stops her from doing normal activities of daily living  December 1, 2021 visit:  Recently seen for left hand/thumb CMC OA. Was recommended for an injection. Unable to get at that time due to recent COVID shot. Returns today for injection.  No interval change.  See visit from 11/17/21 for more details.    November 17, 2021 visit:  Complains of years worth of left thumb pain.  Nonradiating.  It bothers her when she .  She did have injections couple years ago which seemed to help at one point.  She did some hand therapy of thumb spica brace.  She felt it was more related to her job at that time.  However is now retired.  She has issues with knitting and crocheting.  No numbness and tingling           May 17, 2021 visit:  Previously seen for other issues.  Has been about 3 years since last seen. First itme for her shoulder. Reports about 1 year history of right shoulder pain following putting her dock at her cabin. She does not report a specific incident during placing her dock but noticed her shoulder sore after.  Slowly progressive shoulder pain over the last year. Started as posterior/lateral shoulder pain that has progressed to now including the biceps, trap, and into the neck.  Starting two weeks ago also started to have issues with numbness/tingling radiating down the entire arm into the hand.  This is new. Intermittent.  Denies any dropping of objects, loss of b/b or  "difficulty walking.    Has pain at rest, with activity, especially with attempting any behind the back or lifting.  Has been struggling with crocheting as well.   Retired.   No previous right shoulder issues. Did have a left shoulder rotator cuff repair about 11 years ago. She states this feels different.      Denies previous neck issues.     Social History     Occupational History     Not on file   Tobacco Use     Smoking status: Never Smoker     Smokeless tobacco: Never Used   Vaping Use     Vaping Use: Never used   Substance and Sexual Activity     Alcohol use: Yes     Alcohol/week: 0.0 standard drinks     Comment: occasional     Drug use: No     Sexual activity: Yes     Partners: Male     Birth control/protection: Surgical           Physical Exam:  Vitals: /72   Ht 1.626 m (5' 4\")   Wt 91.6 kg (202 lb)   LMP  (LMP Unknown)   BMI 34.67 kg/m    BMI= Body mass index is 34.67 kg/m .  Constitutional: healthy, alert and no acute distress   Psychiatric: mentation appears normal and affect normal/bright  NEURO: no focal deficits  RESP: Normal with easy respirations and no use of accessory muscles to breathe, no audible wheezing or retractions  CV: LUE:   no edema         Regular rate and rhythm by palpation  SKIN: No erythema, rashes, excoriation, or breakdown. No evidence of infection.   JOINT/EXTREMITIES:left hand: No gross deformity.  There is exquisite tenderness at the first CMC joint.  Positive grind test.  No evidence infection.  Fingers are warm and dry with good capillary refill.  GAIT: not tested             Diagnostic Modalities:  Previous imaging reviewed.  Independent visualization of the images was performed.      Impression: left thumb CMC primary osteoarthritis    Plan:  All of the above pertinent physical exam and imaging modalities findings was reviewed with Elisabeth and her .    Treatment options discussed.  Radiographs are clinically correlated.  Longstanding progressive pain.  Is been " refractory to rest, activity modification, bracing, therapy, steroid injections, anti-inflammatories.  She has pain with basic activities.  She is dropping objects.  Given her continued symptoms refractory to conservative care we discussed proceeding with a trapezium excision with suture suspension.  The risk, benefits, complications discussed risk including bleeding, infection, artery, nerve injury, fracture reviewed.  Postoperative timeframe for recovery discussed once reviewed she opted proceed.  General anesthesia.  Same-day surgery.    I did discuss my departure from [place].  She understands.    Return to clinic 10 days post-operatively. , or sooner as needed for changes.  Re-x-ray on return: No    Britton Ochoa D.O.

## 2022-01-19 NOTE — TELEPHONE ENCOUNTER
Type of surgery: Left EXCISION, TRAPEZIUM, WITH SUTURE SUSPENSIONPLASTY    Location of surgery: [facility] OR  Date and time of surgery: 1/31  Surgeon: Gabriela  Pre-Op Appt Date: checking with PCP  Post-Op Appt Date: 2/10   Packet sent out: Yes  Pre-cert/Authorization completed:  Not Applicable  Date: na

## 2022-01-20 DIAGNOSIS — Z11.59 ENCOUNTER FOR SCREENING FOR OTHER VIRAL DISEASES: Primary | ICD-10-CM

## 2022-01-24 ENCOUNTER — TRANSFERRED RECORDS (OUTPATIENT)
Dept: INTERNAL MEDICINE | Facility: CLINIC | Age: 72
End: 2022-01-24

## 2022-01-24 ENCOUNTER — OFFICE VISIT (OUTPATIENT)
Dept: INTERNAL MEDICINE | Facility: CLINIC | Age: 72
End: 2022-01-24
Payer: COMMERCIAL

## 2022-01-24 VITALS
DIASTOLIC BLOOD PRESSURE: 74 MMHG | SYSTOLIC BLOOD PRESSURE: 116 MMHG | WEIGHT: 195 LBS | HEART RATE: 110 BPM | BODY MASS INDEX: 33.47 KG/M2 | RESPIRATION RATE: 10 BRPM | OXYGEN SATURATION: 99 % | TEMPERATURE: 98.2 F

## 2022-01-24 DIAGNOSIS — Z01.818 PREOP GENERAL PHYSICAL EXAM: Primary | ICD-10-CM

## 2022-01-24 DIAGNOSIS — G89.29 CHRONIC PAIN OF LEFT THUMB: ICD-10-CM

## 2022-01-24 DIAGNOSIS — M79.645 CHRONIC PAIN OF LEFT THUMB: ICD-10-CM

## 2022-01-24 PROCEDURE — 93000 ELECTROCARDIOGRAM COMPLETE: CPT | Performed by: INTERNAL MEDICINE

## 2022-01-24 PROCEDURE — 99214 OFFICE O/P EST MOD 30 MIN: CPT | Performed by: INTERNAL MEDICINE

## 2022-01-24 NOTE — PROGRESS NOTES
[address] [address]  Phone: [phone]  Primary Provider: Yon Gallagher  Pre-op Performing Provider: YON GALLAGHER    PREOPERATIVE EVALUATION:  Today's date: 1/24/2022    Elisabeth Voss is a 71 year old female who presents for a preoperative evaluation.    Surgical Information:  Surgery/Procedure: Left EXCISION, TRAPEZIUM, WITH SUTURE SUSPENSIONPLASTY  Surgery Location:  [facility]   Surgeon: Gabriela  Surgery Date: 1/31/2022  Time of Surgery: TBD  Where patient plans to recover: At home with family  Fax number for surgical facility: Note does not need to be faxed, will be available electronically in Epic.    Type of Anesthesia Anticipated: General, prefer Local/block and MAC     Assessment & Plan     The proposed surgical procedure is considered INTERMEDIATE risk.    Problem List Items Addressed This Visit     None      Visit Diagnoses     Preop general physical exam    -  Primary    Relevant Orders    EKG 12-lead complete w/read - Clinics (Completed)    Chronic pain of left thumb            Preop for surgery on the left thumb.  Presumably will be a block with MAC.  Patient is low risk she has hypothyroidism and hyperlipidemia.  Labs were reviewed from July and are stable.  She is not on any diuretics.  Normal further labs needed.  She will hold her NSAIDs of ibuprofen and naproxen.  She can use Tylenol for pain control.               RECOMMENDATION:  APPROVAL GIVEN to proceed with proposed procedure, without further diagnostic evaluation.                      Subjective     HPI related to upcoming procedure: Left thumb with pain and needs relief.     Preop Questions 1/20/2022   1. Have you ever had a heart attack or stroke? No   2. Have you ever had surgery on your heart or blood vessels, such as a stent placement, a coronary artery bypass, or surgery on an artery in your head, neck, heart, or legs? No   3. Do you have chest pain with activity? No    4. Do you have a history of  heart failure? No   5. Do you currently have a cold, bronchitis or symptoms of other infection? No   6. Do you have a cough, shortness of breath, or wheezing? No   7. Do you or anyone in your family have previous history of blood clots? No   8. Do you or does anyone in your family have a serious bleeding problem such as prolonged bleeding following surgeries or cuts? No   9. Have you ever had problems with anemia or been told to take iron pills? No   10. Have you had any abnormal blood loss such as black, tarry or bloody stools, or abnormal vaginal bleeding? No   11. Have you ever had a blood transfusion? YES -    11a. Have you ever had a transfusion reaction? No   12. Are you willing to have a blood transfusion if it is medically needed before, during, or after your surgery? Yes   13. Have you or any of your relatives ever had problems with anesthesia? No   14. Do you have sleep apnea, excessive snoring or daytime drowsiness? No   15. Do you have any artifical heart valves or other implanted medical devices like a pacemaker, defibrillator, or continuous glucose monitor? No   16. Do you have artificial joints? No   17. Are you allergic to latex? No   18. Is there any chance that you may be pregnant? -       Health Care Directive:  Patient does not have a Health Care Directive or Living Will: Patient states has Advance Directive and will bring in a copy to clinic.    Preoperative Review of :   reviewed - no record of controlled substances prescribed.      Status of Chronic Conditions:  HYPERLIPIDEMIA - Patient has a long history of significant Hyperlipidemia requiring medication for treatment with recent good control. Patient reports no problems or side effects with the medication.     HYPOTHYROIDISM - Patient has a longstanding history of chronic Hypothyroidism. Patient has been doing well, noting no tremor, insomnia, hair loss or changes in skin texture. Continues to take  medications as directed, without adverse reactions or side effects. Last TSH   Lab Results   Component Value Date    TSH 2.20 04/16/2021   .        Review of Systems  Constitutional, neuro, ENT, endocrine, pulmonary, cardiac, gastrointestinal, genitourinary, musculoskeletal, integument and psychiatric systems are negative, except as otherwise noted.    Patient Active Problem List    Diagnosis Date Noted     Primary osteoarthritis of first carpometacarpal joint of left hand 11/17/2021     Priority: Medium     Calcific tendinitis of right shoulder 05/17/2021     Priority: Medium     Impingement syndrome of shoulder region, right 05/17/2021     Priority: Medium     Acute bursitis of right shoulder 05/17/2021     Priority: Medium     Cervical radiculopathy 05/17/2021     Priority: Medium     Varicose veins of leg with pain, right 11/12/2019     Priority: Medium     Gastroesophageal reflux disease with esophagitis 11/01/2019     Priority: Medium     Pseudophakia of both eyes 01/06/2017     Priority: Medium     Cataracts, bilateral 11/08/2016     Priority: Medium     Glaucoma suspect 11/08/2016     Priority: Medium     Epiretinal membrane (ERM) of right eye 11/08/2016     Priority: Medium     Myopia, bilateral 11/08/2016     Priority: Medium     Astigmatism, bilateral 11/08/2016     Priority: Medium     Tear of medial meniscus of knee, left, initial encounter 09/07/2016     Priority: Medium     Chondromalacia of patellofemoral joint, left 08/03/2016     Priority: Medium     Meningioma (H) 08/06/2015     Priority: Medium     Small right frontal meningioma.         Hypothyroidism 06/03/2015     Priority: Medium     History of melanoma 01/15/2015     Priority: Medium     Malignant melanoma of skin of face (H) 06/26/2014     Priority: Medium     Hyperlipidemia LDL goal <160 01/21/2013     Priority: Medium     Advanced directives, counseling/discussion 12/11/2012     Priority: Medium     Discussed advance care planning with  patient; information given to patient to review.Radha Real, [facility]  12/11/2012   Advanced directive planning/honoring choices packet information given to patient to review at time of appointment.  Pete Colby, [facility]  8/11/2014            Past Medical History:   Diagnosis Date     Cancer (H)     melanoma of cheek     History of blood transfusion     with hysterectomy many years ago     Lyme disease      Thyroid disease     partial thyroidectomy 8/2014     Past Surgical History:   Procedure Laterality Date     ARTHROSCOPY KNEE WITH MEDIAL MENISCECTOMY Left 9/20/2016    Procedure: ARTHROSCOPY KNEE WITH MEDIAL MENISCECTOMY;  Surgeon: Maximilian Ochoa DO;  [facility]     BIOPSY NODE SENTINEL N/A 8/26/2014    Procedure: BIOPSY NODE SENTINEL;  Surgeon: Eliza Foss MD;  [facility]     BLEPHAROPLASTY BILATERAL Bilateral 1/11/2018    Procedure: BLEPHAROPLASTY BILATERAL;  Bilateral upper eyelid blepharoplasty;  Surgeon: Jame Painter MD;  [facility]     CATARACT IOL, RT/LT Bilateral OD 12/22/16-OS 12/8/16     COLONOSCOPY  3/22/2013    Procedure: COLONOSCOPY;  colonoscopy;  Surgeon: Mike Lr MD;  [facility]     HYSTERECTOMY, PAP NO LONGER INDICATED       ORTHOPEDIC SURGERY      shoulder surgery     PHACOEMULSIFICATION WITH STANDARD INTRAOCULAR LENS IMPLANT Left 12/8/2016    Procedure: PHACOEMULSIFICATION WITH STANDARD INTRAOCULAR LENS IMPLANT;  Surgeon: Jame Painter MD;  [facility]     PHACOEMULSIFICATION WITH STANDARD INTRAOCULAR LENS IMPLANT Right 12/22/2016    Procedure: PHACOEMULSIFICATION WITH STANDARD INTRAOCULAR LENS IMPLANT;  Surgeon: Jame Painter MD;  [facility]     RECONSTRUCT NOSE STAGE ONE N/A 9/5/2014    Procedure: RECONSTRUCT NOSE STAGE ONE;  Surgeon: Levy Holbrook MD;  [facility]     REVISE SCAR FACE N/A 8/26/2014    Procedure: REVISE SCAR FACE;  Surgeon: Eliza Foss MD;  [facility]      THYROIDECTOMY Left 8/26/2014    Procedure: THYROIDECTOMY;  Surgeon: Eliza Foss MD;  [facility]     [facility] TOTAL ABDOM HYSTERECTOMY  11/16/1993    Wili NEVILLE urethropexy     Current Outpatient Medications   Medication Sig Dispense Refill     cephALEXin (KEFLEX) 500 MG capsule Take 1 capsule (500 mg) by mouth 3 times daily 30 capsule 0     levothyroxine (SYNTHROID/LEVOTHROID) 50 MCG tablet Take 1 tablet by mouth once daily 90 tablet 1     mupirocin (BACTROBAN) 2 % external ointment Apply topically 3 times daily 22 g 0     NAPROXEN PO Take 660 mg by mouth 2 times daily as needed for moderate pain        pantoprazole (PROTONIX) 40 MG EC tablet Take 1 tablet (40 mg) by mouth daily 90 tablet 3     simvastatin (ZOCOR) 20 MG tablet Take 1 tablet by mouth once daily 90 tablet 1       Allergies   Allergen Reactions     No Known Drug Allergies         Social History     Tobacco Use     Smoking status: Never Smoker     Smokeless tobacco: Never Used   Substance Use Topics     Alcohol use: Yes     Alcohol/week: 0.0 standard drinks     Comment: occasional       History   Drug Use No         Objective     LMP  (LMP Unknown)     Physical Exam    GENERAL APPEARANCE: healthy, alert and no distress     NECK: no adenopathy, no asymmetry, masses, or scars and thyroid normal to palpation     RESP: lungs clear to auscultation - no rales, rhonchi or wheezes     CV: regular rates and rhythm, normal S1 S2, no S3 or S4 and no murmur, click or rub     MS: extremities normal- no gross deformities noted, no evidence of inflammation in joints, FROM in all extremities.     SKIN: no suspicious lesions or rashes     NEURO: Normal strength and tone, sensory exam grossly normal, mentation intact and speech normal     PSYCH: mentation appears normal. and affect normal/bright     LYMPHATICS: No cervical adenopathy    Recent Labs   Lab Test 07/16/21  0846 07/23/20  0750   HGB 13.3 14.3    255    140   POTASSIUM 4.2 4.3   CR  1.01 1.02        Diagnostics:  No labs were ordered during this visit.   EKG: appears normal, NSR, normal axis, normal intervals, no acute ST/T changes c/w ischemia, no LVH by voltage criteria, unchanged from previous tracings    Revised Cardiac Risk Index (RCRI):  The patient has the following serious cardiovascular risks for perioperative complications:   - No serious cardiac risks = 0 points     RCRI Interpretation: 1 point: Class II (low risk - 0.9% complication rate)           Signed Electronically by: Yon Gallagher MD  Copy of this evaluation report is provided to requesting physician.

## 2022-01-24 NOTE — H&P (VIEW-ONLY)
[address] [address]  Phone: [phone]  Primary Provider: Yon Gallagher  Pre-op Performing Provider: YON GALLAGHER    PREOPERATIVE EVALUATION:  Today's date: 1/24/2022    Elisabeth Voss is a 71 year old female who presents for a preoperative evaluation.    Surgical Information:  Surgery/Procedure: Left EXCISION, TRAPEZIUM, WITH SUTURE SUSPENSIONPLASTY  Surgery Location:  [facility]   Surgeon: Gabriela  Surgery Date: 1/31/2022  Time of Surgery: TBD  Where patient plans to recover: At home with family  Fax number for surgical facility: Note does not need to be faxed, will be available electronically in Epic.    Type of Anesthesia Anticipated: General, prefer Local/block and MAC     Assessment & Plan     The proposed surgical procedure is considered INTERMEDIATE risk.    Problem List Items Addressed This Visit     None      Visit Diagnoses     Preop general physical exam    -  Primary    Relevant Orders    EKG 12-lead complete w/read - Clinics (Completed)    Chronic pain of left thumb            Preop for surgery on the left thumb.  Presumably will be a block with MAC.  Patient is low risk she has hypothyroidism and hyperlipidemia.  Labs were reviewed from July and are stable.  She is not on any diuretics.  Normal further labs needed.  She will hold her NSAIDs of ibuprofen and naproxen.  She can use Tylenol for pain control.               RECOMMENDATION:  APPROVAL GIVEN to proceed with proposed procedure, without further diagnostic evaluation.                      Subjective     HPI related to upcoming procedure: Left thumb with pain and needs relief.     Preop Questions 1/20/2022   1. Have you ever had a heart attack or stroke? No   2. Have you ever had surgery on your heart or blood vessels, such as a stent placement, a coronary artery bypass, or surgery on an artery in your head, neck, heart, or legs? No   3. Do you have chest pain with activity? No    4. Do you have a history of  heart failure? No   5. Do you currently have a cold, bronchitis or symptoms of other infection? No   6. Do you have a cough, shortness of breath, or wheezing? No   7. Do you or anyone in your family have previous history of blood clots? No   8. Do you or does anyone in your family have a serious bleeding problem such as prolonged bleeding following surgeries or cuts? No   9. Have you ever had problems with anemia or been told to take iron pills? No   10. Have you had any abnormal blood loss such as black, tarry or bloody stools, or abnormal vaginal bleeding? No   11. Have you ever had a blood transfusion? YES -    11a. Have you ever had a transfusion reaction? No   12. Are you willing to have a blood transfusion if it is medically needed before, during, or after your surgery? Yes   13. Have you or any of your relatives ever had problems with anesthesia? No   14. Do you have sleep apnea, excessive snoring or daytime drowsiness? No   15. Do you have any artifical heart valves or other implanted medical devices like a pacemaker, defibrillator, or continuous glucose monitor? No   16. Do you have artificial joints? No   17. Are you allergic to latex? No   18. Is there any chance that you may be pregnant? -       Health Care Directive:  Patient does not have a Health Care Directive or Living Will: Patient states has Advance Directive and will bring in a copy to clinic.    Preoperative Review of :   reviewed - no record of controlled substances prescribed.      Status of Chronic Conditions:  HYPERLIPIDEMIA - Patient has a long history of significant Hyperlipidemia requiring medication for treatment with recent good control. Patient reports no problems or side effects with the medication.     HYPOTHYROIDISM - Patient has a longstanding history of chronic Hypothyroidism. Patient has been doing well, noting no tremor, insomnia, hair loss or changes in skin texture. Continues to take  medications as directed, without adverse reactions or side effects. Last TSH   Lab Results   Component Value Date    TSH 2.20 04/16/2021   .        Review of Systems  Constitutional, neuro, ENT, endocrine, pulmonary, cardiac, gastrointestinal, genitourinary, musculoskeletal, integument and psychiatric systems are negative, except as otherwise noted.    Patient Active Problem List    Diagnosis Date Noted     Primary osteoarthritis of first carpometacarpal joint of left hand 11/17/2021     Priority: Medium     Calcific tendinitis of right shoulder 05/17/2021     Priority: Medium     Impingement syndrome of shoulder region, right 05/17/2021     Priority: Medium     Acute bursitis of right shoulder 05/17/2021     Priority: Medium     Cervical radiculopathy 05/17/2021     Priority: Medium     Varicose veins of leg with pain, right 11/12/2019     Priority: Medium     Gastroesophageal reflux disease with esophagitis 11/01/2019     Priority: Medium     Pseudophakia of both eyes 01/06/2017     Priority: Medium     Cataracts, bilateral 11/08/2016     Priority: Medium     Glaucoma suspect 11/08/2016     Priority: Medium     Epiretinal membrane (ERM) of right eye 11/08/2016     Priority: Medium     Myopia, bilateral 11/08/2016     Priority: Medium     Astigmatism, bilateral 11/08/2016     Priority: Medium     Tear of medial meniscus of knee, left, initial encounter 09/07/2016     Priority: Medium     Chondromalacia of patellofemoral joint, left 08/03/2016     Priority: Medium     Meningioma (H) 08/06/2015     Priority: Medium     Small right frontal meningioma.         Hypothyroidism 06/03/2015     Priority: Medium     History of melanoma 01/15/2015     Priority: Medium     Malignant melanoma of skin of face (H) 06/26/2014     Priority: Medium     Hyperlipidemia LDL goal <160 01/21/2013     Priority: Medium     Advanced directives, counseling/discussion 12/11/2012     Priority: Medium     Discussed advance care planning with  patient; information given to patient to review.Radha Real, [facility]  12/11/2012   Advanced directive planning/honoring choices packet information given to patient to review at time of appointment.  Pete Colby, [facility]  8/11/2014            Past Medical History:   Diagnosis Date     Cancer (H)     melanoma of cheek     History of blood transfusion     with hysterectomy many years ago     Lyme disease      Thyroid disease     partial thyroidectomy 8/2014     Past Surgical History:   Procedure Laterality Date     ARTHROSCOPY KNEE WITH MEDIAL MENISCECTOMY Left 9/20/2016    Procedure: ARTHROSCOPY KNEE WITH MEDIAL MENISCECTOMY;  Surgeon: Maximilian Ochoa DO;  [facility]     BIOPSY NODE SENTINEL N/A 8/26/2014    Procedure: BIOPSY NODE SENTINEL;  Surgeon: Eliza Foss MD;  [facility]     BLEPHAROPLASTY BILATERAL Bilateral 1/11/2018    Procedure: BLEPHAROPLASTY BILATERAL;  Bilateral upper eyelid blepharoplasty;  Surgeon: Jame Painter MD;  [facility]     CATARACT IOL, RT/LT Bilateral OD 12/22/16-OS 12/8/16     COLONOSCOPY  3/22/2013    Procedure: COLONOSCOPY;  colonoscopy;  Surgeon: Mike Lr MD;  [facility]     HYSTERECTOMY, PAP NO LONGER INDICATED       ORTHOPEDIC SURGERY      shoulder surgery     PHACOEMULSIFICATION WITH STANDARD INTRAOCULAR LENS IMPLANT Left 12/8/2016    Procedure: PHACOEMULSIFICATION WITH STANDARD INTRAOCULAR LENS IMPLANT;  Surgeon: Jame Painter MD;  [facility]     PHACOEMULSIFICATION WITH STANDARD INTRAOCULAR LENS IMPLANT Right 12/22/2016    Procedure: PHACOEMULSIFICATION WITH STANDARD INTRAOCULAR LENS IMPLANT;  Surgeon: Jame Painter MD;  [facility]     RECONSTRUCT NOSE STAGE ONE N/A 9/5/2014    Procedure: RECONSTRUCT NOSE STAGE ONE;  Surgeon: Levy Holbrook MD;  [facility]     REVISE SCAR FACE N/A 8/26/2014    Procedure: REVISE SCAR FACE;  Surgeon: Eliza Foss MD;  [facility]      THYROIDECTOMY Left 8/26/2014    Procedure: THYROIDECTOMY;  Surgeon: Eliza Foss MD;  [facility]     [facility] TOTAL ABDOM HYSTERECTOMY  11/16/1993    Wili NEVILLE urethropexy     Current Outpatient Medications   Medication Sig Dispense Refill     cephALEXin (KEFLEX) 500 MG capsule Take 1 capsule (500 mg) by mouth 3 times daily 30 capsule 0     levothyroxine (SYNTHROID/LEVOTHROID) 50 MCG tablet Take 1 tablet by mouth once daily 90 tablet 1     mupirocin (BACTROBAN) 2 % external ointment Apply topically 3 times daily 22 g 0     NAPROXEN PO Take 660 mg by mouth 2 times daily as needed for moderate pain        pantoprazole (PROTONIX) 40 MG EC tablet Take 1 tablet (40 mg) by mouth daily 90 tablet 3     simvastatin (ZOCOR) 20 MG tablet Take 1 tablet by mouth once daily 90 tablet 1       Allergies   Allergen Reactions     No Known Drug Allergies         Social History     Tobacco Use     Smoking status: Never Smoker     Smokeless tobacco: Never Used   Substance Use Topics     Alcohol use: Yes     Alcohol/week: 0.0 standard drinks     Comment: occasional       History   Drug Use No         Objective     LMP  (LMP Unknown)     Physical Exam    GENERAL APPEARANCE: healthy, alert and no distress     NECK: no adenopathy, no asymmetry, masses, or scars and thyroid normal to palpation     RESP: lungs clear to auscultation - no rales, rhonchi or wheezes     CV: regular rates and rhythm, normal S1 S2, no S3 or S4 and no murmur, click or rub     MS: extremities normal- no gross deformities noted, no evidence of inflammation in joints, FROM in all extremities.     SKIN: no suspicious lesions or rashes     NEURO: Normal strength and tone, sensory exam grossly normal, mentation intact and speech normal     PSYCH: mentation appears normal. and affect normal/bright     LYMPHATICS: No cervical adenopathy    Recent Labs   Lab Test 07/16/21  0846 07/23/20  0750   HGB 13.3 14.3    255    140   POTASSIUM 4.2 4.3   CR  1.01 1.02        Diagnostics:  No labs were ordered during this visit.   EKG: appears normal, NSR, normal axis, normal intervals, no acute ST/T changes c/w ischemia, no LVH by voltage criteria, unchanged from previous tracings    Revised Cardiac Risk Index (RCRI):  The patient has the following serious cardiovascular risks for perioperative complications:   - No serious cardiac risks = 0 points     RCRI Interpretation: 1 point: Class II (low risk - 0.9% complication rate)           Signed Electronically by: Yon Gallagher MD  Copy of this evaluation report is provided to requesting physician.

## 2022-01-24 NOTE — PATIENT INSTRUCTIONS
Preparing for Your Surgery  Getting started  A nurse will call you to review your health history and instructions. They will give you an arrival time based on your scheduled surgery time. Please be ready to share:    Your doctor's clinic name and phone number    Your medical, surgical and anesthesia history    A list of allergies and sensitivities    A list of medicines, including herbal treatments and over-the-counter drugs    Whether the patient has a legal guardian (ask how to send us the papers in advance)  Please tell us if you're pregnant--or if there's any chance you might be pregnant. Some surgeries may injure a fetus (unborn baby), so they require a pregnancy test. Surgeries that are safe for a fetus don't always need a test, and you can choose whether to have one.   If you have a child who's having surgery, please ask for a copy of Preparing for Your Child's Surgery.    Preparing for surgery    Within 30 days of surgery: Have a pre-op exam (sometimes called an H&P, or History and Physical). This can be done at a clinic or pre-operative center.  ? If you're having a , you may not need this exam. Talk to your care team.    At your pre-op exam, talk to your care team about all medicines you take. If you need to stop any medicines before surgery, ask when to start taking them again.  ? We do this for your safety. Many medicines can make you bleed too much during surgery. Some change how well surgery (anesthesia) drugs work.    Call your insurance company to let them know you're having surgery. (If you don't have insurance, call [phone].)    Call your clinic if there's any change in your health. This includes signs of a cold or flu (sore throat, runny nose, cough, rash, fever). It also includes a scrape or scratch near the surgery site.    If you have questions on the day of surgery, call your hospital or surgery center.  COVID testing  You may need to be tested for COVID-19 before having  surgery. If so, your surgical team will give you instructions for scheduling this test, separate from your preoperative history and physical.  Eating and drinking guidelines  For your safety: Unless your surgeon tells you otherwise, follow the guidelines below.    Eat and drink as usual until 8 hours before surgery. After that, no food or milk.    Drink clear liquids until 2 hours before surgery. These are liquids you can see through, like water, Gatorade and Propel Water. You may also have black coffee and tea (no cream or milk).    Nothing by mouth within 2 hours of surgery. This includes gum, candy and breath mints.    If you drink alcohol: Stop drinking it the night before surgery.    If your care team tells you to take medicine on the morning of surgery, it's okay to take it with a sip of water.  Preventing infection    Shower or bathe the night before and morning of your surgery. Follow the instructions your clinic gave you. (If no instructions, use regular soap.)    Don't shave or clip hair near your surgery site. We'll remove the hair if needed.    Don't smoke or vape the morning of surgery. You may chew nicotine gum up to 2 hours before surgery. A nicotine patch is okay.  ? Note: Some surgeries require you to completely quit smoking and nicotine. Check with your surgeon.    Your care team will make every effort to keep you safe from infection. We will:  ? Clean our hands often with soap and water (or an alcohol-based hand rub).  ? Clean the skin at your surgery site with a special soap that kills germs.  ? Give you a special gown to keep you warm. (Cold raises the risk of infection.)  ? Wear special hair covers, masks, gowns and gloves during surgery.  ? Give antibiotic medicine, if prescribed. Not all surgeries need antibiotics.  What to bring on the day of surgery    Photo ID and insurance card    Copy of your health care directive, if you have one    Glasses and hearing aides (bring cases)  ? You can't  wear contacts during surgery    Inhaler and eye drops, if you use them (tell us about these when you arrive)    CPAP machine or breathing device, if you use them    A few personal items, if spending the night    If you have . . .  ? A pacemaker, ICD (cardiac defibrillator) or other implant: Bring the ID card.  ? An implanted stimulator: Bring the remote control.  ? A legal guardian: Bring a copy of the certified (court-stamped) guardianship papers.  Please remove any jewelry, including body piercings. Leave jewelry and other valuables at home.  If you're going home the day of surgery    You must have a responsible adult drive you home. They should stay with you overnight as well.    If you don't have someone to stay with you, and you aren't safe to go home alone, we may keep you overnight. Insurance often won't pay for this.  After surgery  If it's hard to control your pain or you need more pain medicine, please call your surgeon's office.  Questions?   If you have any questions for your care team, list them here: _________________________________________________________________________________________________________________________________________________________________________ ____________________________________ ____________________________________ ____________________________________  For informational purposes only. Not to replace the advice of your health care provider. Copyright   2003, 2019 [facility]. All rights reserved. Clinically reviewed by Vi Coleman MD. Directly [id number] - REV 07/21.

## 2022-01-27 ENCOUNTER — LAB (OUTPATIENT)
Dept: LAB | Facility: CLINIC | Age: 72
End: 2022-01-27
Payer: COMMERCIAL

## 2022-01-27 DIAGNOSIS — Z11.59 ENCOUNTER FOR SCREENING FOR OTHER VIRAL DISEASES: ICD-10-CM

## 2022-01-27 LAB — SARS-COV-2 RNA RESP QL NAA+PROBE: NEGATIVE

## 2022-01-27 PROCEDURE — U0005 INFEC AGEN DETEC AMPLI PROBE: HCPCS

## 2022-01-27 PROCEDURE — U0003 INFECTIOUS AGENT DETECTION BY NUCLEIC ACID (DNA OR RNA); SEVERE ACUTE RESPIRATORY SYNDROME CORONAVIRUS 2 (SARS-COV-2) (CORONAVIRUS DISEASE [COVID-19]), AMPLIFIED PROBE TECHNIQUE, MAKING USE OF HIGH THROUGHPUT TECHNOLOGIES AS DESCRIBED BY CMS-2020-01-R: HCPCS

## 2022-01-31 ENCOUNTER — ANESTHESIA EVENT (OUTPATIENT)
Dept: SURGERY | Facility: CLINIC | Age: 72
End: 2022-01-31
Payer: COMMERCIAL

## 2022-01-31 ENCOUNTER — HOSPITAL ENCOUNTER (OUTPATIENT)
Dept: GENERAL RADIOLOGY | Facility: CLINIC | Age: 72
End: 2022-01-31
Attending: ORTHOPAEDIC SURGERY | Admitting: ORTHOPAEDIC SURGERY
Payer: COMMERCIAL

## 2022-01-31 ENCOUNTER — HOSPITAL ENCOUNTER (OUTPATIENT)
Facility: CLINIC | Age: 72
Discharge: HOME | End: 2022-01-31
Attending: ORTHOPAEDIC SURGERY | Admitting: ORTHOPAEDIC SURGERY
Payer: COMMERCIAL

## 2022-01-31 ENCOUNTER — ANESTHESIA (OUTPATIENT)
Dept: SURGERY | Facility: CLINIC | Age: 72
End: 2022-01-31
Payer: COMMERCIAL

## 2022-01-31 VITALS
HEART RATE: 71 BPM | SYSTOLIC BLOOD PRESSURE: 122 MMHG | OXYGEN SATURATION: 96 % | TEMPERATURE: 97.9 F | DIASTOLIC BLOOD PRESSURE: 75 MMHG | RESPIRATION RATE: 16 BRPM

## 2022-01-31 DIAGNOSIS — M18.12 PRIMARY OSTEOARTHRITIS OF FIRST CARPOMETACARPAL JOINT OF LEFT HAND: Primary | ICD-10-CM

## 2022-01-31 DIAGNOSIS — M18.12 PRIMARY OSTEOARTHRITIS OF FIRST CARPOMETACARPAL JOINT OF LEFT HAND: ICD-10-CM

## 2022-01-31 PROCEDURE — 250N000009 HC RX 250: Performed by: NURSE ANESTHETIST, CERTIFIED REGISTERED

## 2022-01-31 PROCEDURE — 25447 ARTHRP NTRCRP/CRP/MTCR NTRPS: CPT | Mod: LT | Performed by: ORTHOPAEDIC SURGERY

## 2022-01-31 PROCEDURE — 250N000009 HC RX 250: Performed by: ORTHOPAEDIC SURGERY

## 2022-01-31 PROCEDURE — 250N000011 HC RX IP 250 OP 636: Performed by: ORTHOPAEDIC SURGERY

## 2022-01-31 PROCEDURE — 999N000141 HC STATISTIC PRE-PROCEDURE NURSING ASSESSMENT: Performed by: ORTHOPAEDIC SURGERY

## 2022-01-31 PROCEDURE — 272N000001 HC OR GENERAL SUPPLY STERILE: Performed by: ORTHOPAEDIC SURGERY

## 2022-01-31 PROCEDURE — 999N000179 XR SURGERY CARM FLUORO LESS THAN 5 MIN W STILLS: Mod: TC

## 2022-01-31 PROCEDURE — 710N000010 HC RECOVERY PHASE 1, LEVEL 2, PER MIN: Performed by: ORTHOPAEDIC SURGERY

## 2022-01-31 PROCEDURE — 250N000025 HC SEVOFLURANE, PER MIN: Performed by: ORTHOPAEDIC SURGERY

## 2022-01-31 PROCEDURE — C1713 ANCHOR/SCREW BN/BN,TIS/BN: HCPCS | Performed by: ORTHOPAEDIC SURGERY

## 2022-01-31 PROCEDURE — 250N000013 HC RX MED GY IP 250 OP 250 PS 637: Performed by: ORTHOPAEDIC SURGERY

## 2022-01-31 PROCEDURE — 258N000003 HC RX IP 258 OP 636: Performed by: NURSE ANESTHETIST, CERTIFIED REGISTERED

## 2022-01-31 PROCEDURE — 370N000017 HC ANESTHESIA TECHNICAL FEE, PER MIN: Performed by: ORTHOPAEDIC SURGERY

## 2022-01-31 PROCEDURE — 250N000011 HC RX IP 250 OP 636: Performed by: NURSE ANESTHETIST, CERTIFIED REGISTERED

## 2022-01-31 PROCEDURE — 25447 ARTHRP NTRCRP/CRP/MTCR NTRPS: CPT | Mod: AS | Performed by: PHYSICIAN ASSISTANT

## 2022-01-31 PROCEDURE — 360N000084 HC SURGERY LEVEL 4 W/ FLUORO, PER MIN: Performed by: ORTHOPAEDIC SURGERY

## 2022-01-31 PROCEDURE — 710N000012 HC RECOVERY PHASE 2, PER MINUTE: Performed by: ORTHOPAEDIC SURGERY

## 2022-01-31 DEVICE — IMPLANTABLE DEVICE: Type: IMPLANTABLE DEVICE | Site: FINGER | Status: FUNCTIONAL

## 2022-01-31 RX ORDER — ACETAMINOPHEN 500 MG
650 TABLET ORAL EVERY 6 HOURS PRN
COMMUNITY
End: 2023-07-26

## 2022-01-31 RX ORDER — LIDOCAINE 40 MG/G
CREAM TOPICAL
Status: DISCONTINUED | OUTPATIENT
Start: 2022-01-31 | End: 2022-01-31 | Stop reason: HOSPADM

## 2022-01-31 RX ORDER — ONDANSETRON 4 MG/1
4 TABLET, ORALLY DISINTEGRATING ORAL EVERY 30 MIN PRN
Status: DISCONTINUED | OUTPATIENT
Start: 2022-01-31 | End: 2022-01-31 | Stop reason: HOSPADM

## 2022-01-31 RX ORDER — ONDANSETRON 2 MG/ML
INJECTION INTRAMUSCULAR; INTRAVENOUS PRN
Status: DISCONTINUED | OUTPATIENT
Start: 2022-01-31 | End: 2022-01-31

## 2022-01-31 RX ORDER — CEFAZOLIN SODIUM 2 G/100ML
2 INJECTION, SOLUTION INTRAVENOUS SEE ADMIN INSTRUCTIONS
Status: DISCONTINUED | OUTPATIENT
Start: 2022-01-31 | End: 2022-01-31 | Stop reason: HOSPADM

## 2022-01-31 RX ORDER — BUPIVACAINE HYDROCHLORIDE 5 MG/ML
INJECTION, SOLUTION PERINEURAL PRN
Status: DISCONTINUED | OUTPATIENT
Start: 2022-01-31 | End: 2022-01-31 | Stop reason: HOSPADM

## 2022-01-31 RX ORDER — ONDANSETRON 2 MG/ML
4 INJECTION INTRAMUSCULAR; INTRAVENOUS EVERY 30 MIN PRN
Status: DISCONTINUED | OUTPATIENT
Start: 2022-01-31 | End: 2022-01-31 | Stop reason: HOSPADM

## 2022-01-31 RX ORDER — CEFAZOLIN SODIUM 2 G/100ML
2 INJECTION, SOLUTION INTRAVENOUS
Status: DISCONTINUED | OUTPATIENT
Start: 2022-01-31 | End: 2022-01-31 | Stop reason: HOSPADM

## 2022-01-31 RX ORDER — LABETALOL HYDROCHLORIDE 5 MG/ML
10 INJECTION, SOLUTION INTRAVENOUS
Status: DISCONTINUED | OUTPATIENT
Start: 2022-01-31 | End: 2022-01-31 | Stop reason: HOSPADM

## 2022-01-31 RX ORDER — SODIUM CHLORIDE, SODIUM LACTATE, POTASSIUM CHLORIDE, CALCIUM CHLORIDE 600; 310; 30; 20 MG/100ML; MG/100ML; MG/100ML; MG/100ML
INJECTION, SOLUTION INTRAVENOUS CONTINUOUS
Status: DISCONTINUED | OUTPATIENT
Start: 2022-01-31 | End: 2022-01-31 | Stop reason: HOSPADM

## 2022-01-31 RX ORDER — HYDRALAZINE HYDROCHLORIDE 20 MG/ML
2.5-5 INJECTION INTRAMUSCULAR; INTRAVENOUS EVERY 10 MIN PRN
Status: DISCONTINUED | OUTPATIENT
Start: 2022-01-31 | End: 2022-01-31 | Stop reason: HOSPADM

## 2022-01-31 RX ORDER — FENTANYL CITRATE 50 UG/ML
25 INJECTION, SOLUTION INTRAMUSCULAR; INTRAVENOUS EVERY 5 MIN PRN
Status: DISCONTINUED | OUTPATIENT
Start: 2022-01-31 | End: 2022-01-31 | Stop reason: HOSPADM

## 2022-01-31 RX ORDER — DEXAMETHASONE SODIUM PHOSPHATE 10 MG/ML
INJECTION, SOLUTION INTRAMUSCULAR; INTRAVENOUS PRN
Status: DISCONTINUED | OUTPATIENT
Start: 2022-01-31 | End: 2022-01-31

## 2022-01-31 RX ORDER — FENTANYL CITRATE 50 UG/ML
25 INJECTION, SOLUTION INTRAMUSCULAR; INTRAVENOUS
Status: DISCONTINUED | OUTPATIENT
Start: 2022-01-31 | End: 2022-01-31 | Stop reason: HOSPADM

## 2022-01-31 RX ORDER — OXYCODONE HYDROCHLORIDE 5 MG/1
5-10 TABLET ORAL EVERY 6 HOURS PRN
Qty: 16 TABLET | Refills: 0 | Status: SHIPPED | OUTPATIENT
Start: 2022-01-31 | End: 2022-02-10

## 2022-01-31 RX ORDER — OXYCODONE HYDROCHLORIDE 5 MG/1
5 TABLET ORAL
Status: COMPLETED | OUTPATIENT
Start: 2022-01-31 | End: 2022-01-31

## 2022-01-31 RX ORDER — MEPERIDINE HYDROCHLORIDE 25 MG/ML
12.5 INJECTION INTRAMUSCULAR; INTRAVENOUS; SUBCUTANEOUS
Status: DISCONTINUED | OUTPATIENT
Start: 2022-01-31 | End: 2022-01-31 | Stop reason: HOSPADM

## 2022-01-31 RX ORDER — FENTANYL CITRATE 50 UG/ML
INJECTION, SOLUTION INTRAMUSCULAR; INTRAVENOUS PRN
Status: DISCONTINUED | OUTPATIENT
Start: 2022-01-31 | End: 2022-01-31

## 2022-01-31 RX ORDER — LIDOCAINE HYDROCHLORIDE 20 MG/ML
INJECTION, SOLUTION INFILTRATION; PERINEURAL PRN
Status: DISCONTINUED | OUTPATIENT
Start: 2022-01-31 | End: 2022-01-31

## 2022-01-31 RX ORDER — DIMENHYDRINATE 50 MG/ML
25 INJECTION, SOLUTION INTRAMUSCULAR; INTRAVENOUS
Status: DISCONTINUED | OUTPATIENT
Start: 2022-01-31 | End: 2022-01-31 | Stop reason: HOSPADM

## 2022-01-31 RX ORDER — ALBUTEROL SULFATE 0.83 MG/ML
2.5 SOLUTION RESPIRATORY (INHALATION) EVERY 4 HOURS PRN
Status: DISCONTINUED | OUTPATIENT
Start: 2022-01-31 | End: 2022-01-31 | Stop reason: HOSPADM

## 2022-01-31 RX ORDER — HYDROMORPHONE HYDROCHLORIDE 1 MG/ML
0.2 INJECTION, SOLUTION INTRAMUSCULAR; INTRAVENOUS; SUBCUTANEOUS EVERY 5 MIN PRN
Status: DISCONTINUED | OUTPATIENT
Start: 2022-01-31 | End: 2022-01-31 | Stop reason: HOSPADM

## 2022-01-31 RX ORDER — PROPOFOL 10 MG/ML
INJECTION, EMULSION INTRAVENOUS PRN
Status: DISCONTINUED | OUTPATIENT
Start: 2022-01-31 | End: 2022-01-31

## 2022-01-31 RX ORDER — PROPOFOL 10 MG/ML
INJECTION, EMULSION INTRAVENOUS CONTINUOUS PRN
Status: DISCONTINUED | OUTPATIENT
Start: 2022-01-31 | End: 2022-01-31

## 2022-01-31 RX ORDER — EPHEDRINE SULFATE 50 MG/ML
INJECTION, SOLUTION INTRAMUSCULAR; INTRAVENOUS; SUBCUTANEOUS PRN
Status: DISCONTINUED | OUTPATIENT
Start: 2022-01-31 | End: 2022-01-31

## 2022-01-31 RX ADMIN — DEXAMETHASONE SODIUM PHOSPHATE 5 MG: 10 INJECTION, SOLUTION INTRAMUSCULAR; INTRAVENOUS at 14:36

## 2022-01-31 RX ADMIN — CEFAZOLIN SODIUM 2 G: 2 INJECTION, SOLUTION INTRAVENOUS at 14:16

## 2022-01-31 RX ADMIN — PROPOFOL 100 MCG/KG/MIN: 10 INJECTION, EMULSION INTRAVENOUS at 14:41

## 2022-01-31 RX ADMIN — LIDOCAINE HYDROCHLORIDE 0.2 ML: 10 INJECTION, SOLUTION EPIDURAL; INFILTRATION; INTRACAUDAL; PERINEURAL at 13:56

## 2022-01-31 RX ADMIN — OXYCODONE HYDROCHLORIDE 5 MG: 5 TABLET ORAL at 16:36

## 2022-01-31 RX ADMIN — FENTANYL CITRATE 50 MCG: 50 INJECTION, SOLUTION INTRAMUSCULAR; INTRAVENOUS at 14:23

## 2022-01-31 RX ADMIN — HYDROMORPHONE HYDROCHLORIDE 0.5 MG: 1 INJECTION, SOLUTION INTRAMUSCULAR; INTRAVENOUS; SUBCUTANEOUS at 14:49

## 2022-01-31 RX ADMIN — FENTANYL CITRATE 25 MCG: 50 INJECTION INTRAMUSCULAR; INTRAVENOUS at 16:20

## 2022-01-31 RX ADMIN — FENTANYL CITRATE 25 MCG: 50 INJECTION INTRAMUSCULAR; INTRAVENOUS at 16:12

## 2022-01-31 RX ADMIN — MIDAZOLAM 2 MG: 1 INJECTION INTRAMUSCULAR; INTRAVENOUS at 14:19

## 2022-01-31 RX ADMIN — FENTANYL CITRATE 50 MCG: 50 INJECTION, SOLUTION INTRAMUSCULAR; INTRAVENOUS at 14:30

## 2022-01-31 RX ADMIN — FENTANYL CITRATE 25 MCG: 50 INJECTION INTRAMUSCULAR; INTRAVENOUS at 16:25

## 2022-01-31 RX ADMIN — LIDOCAINE HYDROCHLORIDE 60 MG: 20 INJECTION, SOLUTION INFILTRATION; PERINEURAL at 14:27

## 2022-01-31 RX ADMIN — ONDANSETRON 4 MG: 2 INJECTION INTRAMUSCULAR; INTRAVENOUS at 14:36

## 2022-01-31 RX ADMIN — PROPOFOL 170 MG: 10 INJECTION, EMULSION INTRAVENOUS at 14:27

## 2022-01-31 RX ADMIN — SODIUM CHLORIDE, POTASSIUM CHLORIDE, SODIUM LACTATE AND CALCIUM CHLORIDE: 600; 310; 30; 20 INJECTION, SOLUTION INTRAVENOUS at 13:56

## 2022-01-31 RX ADMIN — SODIUM CHLORIDE, POTASSIUM CHLORIDE, SODIUM LACTATE AND CALCIUM CHLORIDE: 600; 310; 30; 20 INJECTION, SOLUTION INTRAVENOUS at 14:16

## 2022-01-31 RX ADMIN — Medication 10 MG: at 14:39

## 2022-01-31 NOTE — PROGRESS NOTES
"[facility]    Post Op Check Note    71 year old female POD#0 s/p left excision, trapezium, with suture suspensionplasty  S: Patient seen at bedside in PACU in no apparent distress, sleepy and pleasant.  I discussed surgery with the patient.  She denies numbness, tingling or some pain.  Post Op RN giving medication for this.    O: Sensation: intact light touch on distal thumb and forearm       Motor function: 4/5  strength, moving distal thumb.       Splint on, clean, dry and intact       left hand with minimal swelling and no erythema or ecchymosis        Bilateral calves soft and non tender    Vital signs:  Temp: 97.5  F (36.4  C) Temp src: Temporal BP: 129/75 Pulse: 74   Resp: 14 SpO2: 99 % O2 Device: None (Room air)        Estimated body mass index is 33.47 kg/m  as calculated from the following:    Height as of 1/19/22: 1.626 m (5' 4\").    Weight as of 1/24/22: 88.5 kg (195 lb).      Hemoglobin   Date Value Ref Range Status   07/16/2021 13.3 11.7 - 15.7 g/dL Final   07/23/2020 14.3 11.7 - 15.7 g/dL Final     No results found for: INR      A/P:  1. Pain-controlled   2. DVT Prophylaxis-leg pumps  3. Weight Bearing Status-No more than 1 lb. until follow up.   4. Dispo-discharge to home when cleared by PACU  5. Plan-clinic follow-up.    Red Rico PA-C  1/31/2022      "

## 2022-01-31 NOTE — ANESTHESIA PREPROCEDURE EVALUATION
Anesthesia Pre-Procedure Evaluation    Patient: Elisabeth Voss   MRN: [id number] : 1950        Preoperative Diagnosis: Primary osteoarthritis of first carpometacarpal joint of left hand [M18.12]    Procedure : Procedure(s):  Left EXCISION, TRAPEZIUM, WITH SUTURE SUSPENSIONPLASTY          Past Medical History:   Diagnosis Date     Cancer (H)     melanoma of cheek     History of blood transfusion     with hysterectomy many years ago     Lyme disease      Thyroid disease     partial thyroidectomy 2014      Past Surgical History:   Procedure Laterality Date     ARTHROSCOPY KNEE WITH MEDIAL MENISCECTOMY Left 2016    Procedure: ARTHROSCOPY KNEE WITH MEDIAL MENISCECTOMY;  Surgeon: Maximilian Ochoa DO;  Location: [facility]     BIOPSY NODE SENTINEL N/A 2014    Procedure: BIOPSY NODE SENTINEL;  Surgeon: Eliza Foss MD;  [facility]     BLEPHAROPLASTY BILATERAL Bilateral 2018    Procedure: BLEPHAROPLASTY BILATERAL;  Bilateral upper eyelid blepharoplasty;  Surgeon: Jame Painter MD;  Location: [facility] OR     CATARACT IOL, RT/LT Bilateral OD 16-OS 16     COLONOSCOPY  3/22/2013    Procedure: COLONOSCOPY;  colonoscopy;  Surgeon: Mike Lr MD;  Location: [facility]     HYSTERECTOMY, PAP NO LONGER INDICATED       ORTHOPEDIC SURGERY      shoulder surgery     PHACOEMULSIFICATION WITH STANDARD INTRAOCULAR LENS IMPLANT Left 2016    Procedure: PHACOEMULSIFICATION WITH STANDARD INTRAOCULAR LENS IMPLANT;  Surgeon: Jame Painter MD;  Location: [facility]     PHACOEMULSIFICATION WITH STANDARD INTRAOCULAR LENS IMPLANT Right 2016    Procedure: PHACOEMULSIFICATION WITH STANDARD INTRAOCULAR LENS IMPLANT;  Surgeon: Jame Painter MD;  Location: [facility]     RECONSTRUCT NOSE STAGE ONE N/A 2014    Procedure: RECONSTRUCT NOSE STAGE ONE;  Surgeon: Levy Holbrook MD;  [facility]     REVISE SCAR FACE N/A 2014    Procedure: REVISE SCAR FACE;   Surgeon: Eliza Foss MD;  [facility]     THYROIDECTOMY Left 8/26/2014    Procedure: THYROIDECTOMY;  Surgeon: Eliza Foss MD;  Location: [facility]     ZC TOTAL ABDOM HYSTERECTOMY  11/16/1993    Wili NEVILLE urethropexy      Allergies   Allergen Reactions     No Known Drug Allergies       Social History     Tobacco Use     Smoking status: Never Smoker     Smokeless tobacco: Never Used   Substance Use Topics     Alcohol use: Yes     Alcohol/week: 0.0 standard drinks     Comment: occasional      Wt Readings from Last 1 Encounters:   01/24/22 88.5 kg (195 lb)        Anesthesia Evaluation            ROS/MED HX  ENT/Pulmonary:  - neg pulmonary ROS     Neurologic:  - neg neurologic ROS     Cardiovascular:     (+) Dyslipidemia -----Previous cardiac testing   Echo: Date: Results:    Stress Test: Date: Results:    ECG Reviewed: Date: Results:  NSR  Cath: Date: Results:      METS/Exercise Tolerance:     Hematologic:  - neg hematologic  ROS     Musculoskeletal:       GI/Hepatic:     (+) GERD, Asymptomatic on medication,     Renal/Genitourinary:  - neg Renal ROS     Endo:     (+) thyroid problem, hypothyroidism,     Psychiatric/Substance Use:  - neg psychiatric ROS     Infectious Disease:  - neg infectious disease ROS     Malignancy:  - neg malignancy ROS     Other:            Physical Exam    Airway        Mallampati: II   TM distance: > 3 FB   Neck ROM: full   Mouth opening: > 3 cm    Respiratory Devices and Support         Dental           Cardiovascular   cardiovascular exam normal          Pulmonary   pulmonary exam normal                OUTSIDE LABS:  CBC:   Lab Results   Component Value Date    WBC 5.2 07/16/2021    WBC 5.9 07/23/2020    HGB 13.3 07/16/2021    HGB 14.3 07/23/2020    HCT 40.7 07/16/2021    HCT 43.5 07/23/2020     07/16/2021     07/23/2020     BMP:   Lab Results   Component Value Date     07/16/2021     07/23/2020    POTASSIUM 4.2 07/16/2021    POTASSIUM 4.3  07/23/2020    CHLORIDE 111 (H) 07/16/2021    CHLORIDE 108 07/23/2020    CO2 28 07/16/2021    CO2 28 07/23/2020    BUN 14 07/16/2021    BUN 17 07/23/2020    CR 1.01 07/16/2021    CR 1.02 07/23/2020    GLC 97 07/16/2021    GLC 89 07/23/2020     COAGS: No results found for: PTT, INR, FIBR  POC: No results found for: BGM, HCG, HCGS  HEPATIC:   Lab Results   Component Value Date    ALBUMIN 4.0 07/16/2021    PROTTOTAL 7.1 07/16/2021    ALT 27 07/16/2021    AST 24 07/16/2021    ALKPHOS 67 07/16/2021    BILITOTAL 0.6 07/16/2021     OTHER:   Lab Results   Component Value Date    ANTOINE 9.2 07/16/2021    LIPASE 87 11/28/2005    AMYLASE 46 10/15/2003    TSH 2.20 04/16/2021    T4 1.08 02/27/2017    CRP <2.9 09/09/2015    SED 7 09/09/2015       Anesthesia Plan    ASA Status:  2   NPO Status:  NPO Appropriate    Anesthesia Type: General.     - Airway: LMA   Induction: Intravenous, Propofol.   Maintenance: Balanced.        Consents    Anesthesia Plan(s) and associated risks, benefits, and realistic alternatives discussed. Questions answered and patient/representative(s) expressed understanding.    - Discussed:     - Discussed with:  Patient      - Extended Intubation/Ventilatory Support Discussed: No.      - Patient is DNR/DNI Status: No    Use of blood products discussed: No .     Postoperative Care    Pain management: IV analgesics, Oral pain medications.   PONV prophylaxis: Ondansetron (or other 5HT-3), Dexamethasone or Solumedrol, Background Propofol Infusion (pt denies history of PONV or motion sickness)     Comments:    Other Comments: The risks and benefits of anesthesia, and the alternatives where applicable, have been discussed with the patient, and they wish to proceed.    Denies GERD symptoms, will use LMA.             CORAL Smith CRNA

## 2022-01-31 NOTE — INTERVAL H&P NOTE
This H&P has been reviewed and there are no clinically significant changes in the patient s condition.  The patient was evaluated by myself as well as Dr. Gallagher prior to surgery. The Patient is approved for the surgery and the stated surgical procedure is still clinically indicated.

## 2022-01-31 NOTE — ANESTHESIA PROCEDURE NOTES
Airway       Patient location during procedure: OR  Staff -        Performed By: CRNA  Consent for Airway        Urgency: elective  Indications and Patient Condition       Indications for airway management: kyara-procedural       Induction type:intravenous       Mask difficulty assessment: 1 - vent by mask    Final Airway Details       Final airway type: supraglottic airway    Supraglottic Airway Details        Type: LMA       Brand: LMA Unique       LMA size: 4    Post intubation assessment        Placement verified by: capnometry, equal breath sounds and chest rise        Number of attempts at approach: 1       Number of other approaches attempted: 0       Secured with: plastic tape       Ease of procedure: easy       Dentition: Intact and Unchanged

## 2022-01-31 NOTE — OP NOTE
Procedure Date: 01/31/2022    PREOPERATIVE DIAGNOSIS:  Primary osteoarthritis of first carpometacarpal joint, left.    POSTOPERATIVE DIAGNOSIS:  Primary osteoarthritis of first carpometacarpal joint, left.    PROCEDURE:  Left trapezium excision with suture suspension.    SURGEON:  Maximilian Ochoa DO.    FIRST ASSISTANT:  Srinivas Rico PA-C (utilized throughout the procedure, assisting with soft tissue retraction, assisting with the trapezium excision, and he provided skin closure).    ANESTHESIA:  General.    COMPLICATIONS:  None.    ESTIMATED BLOOD LOSS:  Less than 10 mL.    FLUIDS:  800 mL crystalloids.    TOURNIQUET TIME:  Pressure was 49 minutes at 250 mmHg.    COUNTS:  Correct.    DISPOSITION:  To PACU in stable condition.    GROSS FINDINGS:  There were extensive degenerative changes throughout the first CMC joint with bone-on-bone arthritis with some rimming osteophytes.    IMPLANTS:  Includes Arthrex internal brace kit.    INDICATIONS:  This is a pleasant 71-year-old female with complaints of thumb pain, progressive for many years.  It has been refractory to rest, activity ossification bracing, therapy, steroid injections, anti-inflammatories.  She has difficulty with basic activities.  She is dropping objects.  Her radiographs are clinically correlated.  Given her continued pain refractory to conservative care and impacting the quality of her life, we discussed proceeding with the above surgery.  The risks, benefits, complications discussed.  Risks including bleeding, infection, hardware failure, fracture, artery injury reviewed.  Postoperative timeframe for recovery discussed.  Once reviewed with her, she opted to proceed.    DESCRIPTION OF PROCEDURE:  She was met preoperatively and informed consent was verified, appropriate site was marked, and she was wheeled to the operative suite #1.  Transferred to the OR table without any issues.  When deemed appropriate by Anesthesia, left upper extremity was sterilely  prepped and draped in normal manner.  Prior to incision, a timeout was performed again appropriate patient, surgery and extremity were verified and antibiotics administered.  Esmarch utilized to exsanguinate the extremity.  Tourniquet was inflated to 250 mmHg on her upper arm.  A longitudinal skin incision centered over the first CMC joint was performed.  Blunt dissection carried down.  This was brought more proximal, identifying the artery contribution and branches.  Here it was mobilized.  Some of the perforating branches were ligated to allow for adequate mobilization.  With this completed, a full thickness capsular flap was created.  Then, the trapezium was dissected out.  A guidewire was placed in the trapezium to allow for control.  With a combination of blunt and sharp dissection, the trapezium was taken out hole with no issues.  It was confirmed under fluoroscopy that all fragments had been removed.  The area was irrigated.  A guide pin was placed at the second base of the metacarpal.  Again, this was confirmed to be acceptable with fluoroscopy imaging.  This was then overreamed.  The area was copiously irrigated.  The Arthrex SwiveLock with the brace was placed and confirmed to be well seated.  There was good purchase.  same procedure was then performed on the base of the first metacarpal along the dorsal radial aspect, again confirming under fluoroscopy.  It was then drilled.  Keeping the thumb in an adducted position, the SwiveLock and the suture was placed with no issues.  There was, once again, good purchase in the bone.  The area was irrigated.  The capsule was closed.  Tourniquet deflated.  Hemostasis achieved in the approach.  The skin and subcutaneous was closed with 3-0 Vicryl followed with some running Monocryl and skin glue.  A sterile bandage with a splint was applied.  Followup appointment has been scheduled, discharge instructions provided.    Maximilian Ochoa DO        D: 01/31/2022   T:  2022   MT: [id number]    Name:     TIERRA MORLEY  MRN:      [id number]        Account:        [id number]   :      1950           Procedure Date: 2022     Document: [id number]

## 2022-01-31 NOTE — ANESTHESIA CARE TRANSFER NOTE
Patient: Elisabeth Voss    Procedure: Procedure(s):  Left EXCISION, TRAPEZIUM, WITH SUTURE SUSPENSIONPLASTY       Diagnosis: Primary osteoarthritis of first carpometacarpal joint of left hand [M18.12]  Diagnosis Additional Information: No value filed.    Anesthesia Type:   General     Note:    Oropharynx: oropharynx clear of all foreign objects and spontaneously breathing  Level of Consciousness: drowsy  Oxygen Supplementation: face mask    Independent Airway: airway patency satisfactory and stable  Dentition: dentition unchanged  Vital Signs Stable: post-procedure vital signs reviewed and stable  Report to RN Given: handoff report given  Patient transferred to: PACU    Handoff Report: Identifed the Patient, Identified the Reponsible Provider, Reviewed the pertinent medical history, Discussed the surgical course, Reviewed Intra-OP anesthesia mangement and issues during anesthesia, Set expectations for post-procedure period and Allowed opportunity for questions and acknowledgement of understanding      Vitals:  Vitals Value Taken Time   BP     Temp     Pulse 79 01/31/22 1555   Resp 13 01/31/22 1555   SpO2 99 % 01/31/22 1555   Vitals shown include unvalidated device data.    Electronically Signed By: CORAL Smith CRNA  January 31, 2022  3:56 PM

## 2022-01-31 NOTE — DISCHARGE INSTRUCTIONS
[facility]Day [facility]   Adult Discharge Orders & Instructions     For 24 hours after surgery    1. Get plenty of rest.  A responsible adult must stay with you for at least 24 hours after you leave the hospital.   2. Do not drive or use heavy equipment.  If you have weakness or tingling, don't drive or use heavy equipment until this feeling goes away.  3. Do not drink alcohol.  4. Avoid strenuous or risky activities.  Ask for help when climbing stairs.   5. You may feel lightheaded.  If so, sit for a few minutes before standing.  Have someone help you get up.   6. You may have a slight fever. Call the doctor if your fever is over 100 F (37.7 C) (taken under the tongue) or lasts longer than 24 hours.  7. You may have a dry mouth, a sore throat, muscle aches or trouble sleeping.  These should go away after 24 hours.  8. Do not make important or legal decisions.  We don t expect you to have any problems from the surgery or treatment you had today. Just in case, here s what to do if you have pain, upset stomach (nausea), bleeding or infection:  Pain:  Take medicines your doctor has prescribed or over-the-counter medicine they have suggested. Resting and using ice packs can help, too. For surgery on an arm or leg, raise it on a pillow to ease swelling. Call your doctor if these methods don t work.  Copyright [employer], Licensed under [employer]  Upset stomach (nausea):  Take anti-nausea medicine approved by your doctor. Drink clear liquids like apple juice, ginger ale, broth or 7-Up. Be sure to drink enough fluids. Rest can help, too. Move to normal foods when you re ready. Bleeding:  In the first 24 hours, you may see a little blood on your dressing, about the size of a quarter. You don t need to worry about this much blood, but if the blood spot keeps getting bigger:    Put pressure on the wound if you can, AND    Call your doctor.  Copyright [employer], Licensed under [employer]  International  Fever/Infection: Please call your doctor if you have any of these signs:    Redness    Swelling    Wound feels warm    Pain gets worse    Bad-smelling fluid leaks from wound    Fever or chills  Call your doctor for any of the followin.  It has been over 8 to 10 hours since surgery and you are still not able to urinate (pass water).    2.  Headache for over 24 hours.        Nurse advice line: [phone]

## 2022-01-31 NOTE — BRIEF OP NOTE
[facility] Brief Operative Note    Pre-operative diagnosis: Primary osteoarthritis of first carpometacarpal joint of left hand    Post-operative diagnosis: Same   Procedure: Procedure(s):  Left EXCISION, TRAPEZIUM, WITH SUTURE SUSPENSIONPLASTY   Surgeon:  Anesthesia: Maximilian Ochoa DO  General   Assistant(s): Srinivas Rico PA-C (A Physician Assisstant was necessary for his expertise, exposure and surgical assistance throughout the case.)   Estimated blood loss:  Tourniquet time/pressure:  Urine output:  Fluids: Less than 10 ml  49 minutes at 250 mmHg  na  800 ml Crystalloids   Specimens: None   Findings: left thumb CMC OSTEOARTHRITIS [id number]     Britton Ochoa D.O.      Implant Name Type Inv. Item Serial No.  Lot No. LRB No. Used Action   IMP SYSTEM ARTHREX HAND/WRIST INT BRACE REP KIT AR-[id number] - [id number] Metallic Hardware/[place] IMP SYSTEM ARTHREX HAND/WRIST INT BRACE REP KIT AR-[id number]  ARTHREX [id number] Left 1 Implanted

## 2022-02-01 NOTE — ANESTHESIA POSTPROCEDURE EVALUATION
Patient: Elisabeth Voss    Procedure: Procedure(s):  Left EXCISION, TRAPEZIUM, WITH SUTURE SUSPENSIONPLASTY       Diagnosis:Primary osteoarthritis of first carpometacarpal joint of left hand [M18.12]  Diagnosis Additional Information: No value filed.    Anesthesia Type:  General    Note:  Disposition: Outpatient   Postop Pain Control: Uneventful            Sign Out: Well controlled pain   PONV: No   Neuro/Psych: Uneventful            Sign Out: Acceptable/Baseline neuro status   Airway/Respiratory: Uneventful            Sign Out: Acceptable/Baseline resp. status   CV/Hemodynamics: Uneventful            Sign Out: Acceptable CV status   Other NRE: NONE   DID A NON-ROUTINE EVENT OCCUR? No    Event details/Postop Comments:  Pt was happy with anesthesia care.  No complications.  I will follow up with the pt if needed.           Last vitals:  Vitals Value Taken Time   /75 01/31/22 1645   Temp 97.88  F (36.6  C) 01/31/22 1620   Pulse 68 01/31/22 1649   Resp 10 01/31/22 1649   SpO2 99 % 01/31/22 1649   Vitals shown include unvalidated device data.    Electronically Signed By: CORAL Smith CRNA  January 31, 2022  7:08 PM

## 2022-02-10 ENCOUNTER — OFFICE VISIT (OUTPATIENT)
Dept: ORTHOPEDICS | Facility: CLINIC | Age: 72
End: 2022-02-10
Payer: COMMERCIAL

## 2022-02-10 VITALS
BODY MASS INDEX: 33.89 KG/M2 | SYSTOLIC BLOOD PRESSURE: 110 MMHG | WEIGHT: 198.5 LBS | HEIGHT: 64 IN | TEMPERATURE: 97.3 F | DIASTOLIC BLOOD PRESSURE: 82 MMHG

## 2022-02-10 DIAGNOSIS — M18.12 PRIMARY OSTEOARTHRITIS OF FIRST CARPOMETACARPAL JOINT OF LEFT HAND: Primary | ICD-10-CM

## 2022-02-10 PROCEDURE — 99024 POSTOP FOLLOW-UP VISIT: CPT | Performed by: ORTHOPAEDIC SURGERY

## 2022-02-10 RX ORDER — OXYCODONE HYDROCHLORIDE 5 MG/1
5 TABLET ORAL
Qty: 10 TABLET | Refills: 0 | Status: SHIPPED | OUTPATIENT
Start: 2022-02-10 | End: 2022-03-07

## 2022-02-10 ASSESSMENT — MIFFLIN-ST. JEOR: SCORE: 1395.39

## 2022-02-10 ASSESSMENT — PAIN SCALES - GENERAL: PAINLEVEL: SEVERE PAIN (7)

## 2022-02-10 NOTE — PROGRESS NOTES
Orthopedic Clinic Post-Operative Note    CHIEF COMPLAINT:   Chief Complaint   Patient presents with     Surgical Followup     DOS: 1/31/2022~Left trapezium excision with suture suspension~10 days postop       HISTORY OF PRESENT ILLNESS  Doing well.  Pain has been controlled.  Although she has been out of oxycodone for the last few nights.  She has difficulty sleeping.  Patient's past medical, surgical, social and family histories reviewed.     Past Medical History:   Diagnosis Date     Cancer (H)     melanoma of cheek     History of blood transfusion     with hysterectomy many years ago     Lyme disease      Motion sickness      Thyroid disease     partial thyroidectomy 8/2014       Past Surgical History:   Procedure Laterality Date     ARTHROSCOPY KNEE WITH MEDIAL MENISCECTOMY Left 9/20/2016    Procedure: ARTHROSCOPY KNEE WITH MEDIAL MENISCECTOMY;  Surgeon: Maximilian Ochoa DO;  [facility]     BIOPSY NODE SENTINEL N/A 8/26/2014    Procedure: BIOPSY NODE SENTINEL;  Surgeon: Eliza Foss MD;  [facility]     BLEPHAROPLASTY BILATERAL Bilateral 1/11/2018    Procedure: BLEPHAROPLASTY BILATERAL;  Bilateral upper eyelid blepharoplasty;  Surgeon: Jame Painter MD;  [facility]     CATARACT IOL, RT/LT Bilateral OD 12/22/16-OS 12/8/16     COLONOSCOPY  3/22/2013    Procedure: COLONOSCOPY;  colonoscopy;  Surgeon: Mike Lr MD;  [facility]     HYSTERECTOMY, PAP NO LONGER INDICATED       ORTHOPEDIC SURGERY      shoulder surgery     PHACOEMULSIFICATION WITH STANDARD INTRAOCULAR LENS IMPLANT Left 12/8/2016    Procedure: PHACOEMULSIFICATION WITH STANDARD INTRAOCULAR LENS IMPLANT;  Surgeon: Jame Painter MD;  [facility]     PHACOEMULSIFICATION WITH STANDARD INTRAOCULAR LENS IMPLANT Right 12/22/2016    Procedure: PHACOEMULSIFICATION WITH STANDARD INTRAOCULAR LENS IMPLANT;  Surgeon: Jame Painter MD;  [facility]     RECONSTRUCT NOSE STAGE ONE  N/A 9/5/2014    Procedure: RECONSTRUCT NOSE STAGE ONE;  Surgeon: Levy Holbrook MD;  [facility]     REVISE SCAR FACE N/A 8/26/2014    Procedure: REVISE SCAR FACE;  Surgeon: Eliza Foss MD;  [facility]     SUTURE SUSPENSIONPLASTY THUMB Left 1/31/2022    Procedure: Left EXCISION, TRAPEZIUM, WITH SUTURE SUSPENSIONPLASTY;  Surgeon: Maximilian Ochoa DO;  [facility]     THYROIDECTOMY Left 8/26/2014    Procedure: THYROIDECTOMY;  Surgeon: Eilza Foss MD;  [facility]     ZZC TOTAL ABDOM HYSTERECTOMY  11/16/1993    TAHRSO, Rasheed urethropexy       Medications:  acetaminophen (TYLENOL) 500 MG tablet, Take 650 mg by mouth every 6 hours as needed for mild pain  levothyroxine (SYNTHROID/LEVOTHROID) 50 MCG tablet, Take 1 tablet by mouth once daily  NAPROXEN PO, Take 660 mg by mouth 2 times daily as needed for moderate pain   pantoprazole (PROTONIX) 40 MG EC tablet, Take 1 tablet (40 mg) by mouth daily  simvastatin (ZOCOR) 20 MG tablet, Take 1 tablet by mouth once daily    No current facility-administered medications on file prior to visit.      Allergies   Allergen Reactions     No Known Drug Allergies        Social History     Occupational History     Not on file   Tobacco Use     Smoking status: Never Smoker     Smokeless tobacco: Never Used   Vaping Use     Vaping Use: Never used   Substance and Sexual Activity     Alcohol use: Yes     Alcohol/week: 0.0 standard drinks     Comment: occasional     Drug use: No     Sexual activity: Yes     Partners: Male     Birth control/protection: Surgical       Family History   Problem Relation Age of Onset     Cancer Mother 45        lung cancer     Cerebrovascular Disease Father         bulge in aorta     Heart Disease Maternal Grandfather      Heart Disease Paternal Grandfather      Cancer Sister         esophageal      Cancer Paternal Grandmother      Cancer Brother 65        Thyroid     Other Cancer Sister         lung         Physical  "Exam:  Vitals: /82   Temp 97.3  F (36.3  C) (Temporal)   Ht 1.626 m (5' 4\")   Wt 90 kg (198 lb 8 oz)   LMP  (LMP Unknown)   BMI 34.07 kg/m    BMI= Body mass index is 34.07 kg/m .  Constitutional: healthy, alert and no acute distress   Psychiatric: mentation appears normal and affect normal/bright  NEURO: no focal deficits  SKIN: .healing well, well approximated skin edges, without signs of infection including no erythema, incision breakdown or purlent drainage  JOINT/EXTREMITIES: Minimal swelling.  CMS intact.  Range of motion not fully tested.  No evidence of infection  GAIT: not tested     Diagnostic Modalities:  None today.  Independent visualization of the images was performed.      Impression:   Chief Complaint   Patient presents with     Surgical Followup     DOS: 1/31/2022~Left trapezium excision with suture suspension~10 days postop   Doing well.    Plan:   Transition to a thumb spica splint.  Start therapy-no resistance.  But work on range of motion and decrease inflammation    Small refill of oxycodone to be taken at night only.      Return to clinic 6, week(s), or sooner as needed for changes.    Re-x-ray on return: No    Britton Ochoa D.O.  "

## 2022-02-10 NOTE — LETTER
2/10/2022         RE: Elisabeth Voss  [address] [place] [facility] [address]        Dear Colleague,    Thank you for referring your patient, Elisaebth Voss, to the [facility]. Please see a copy of my visit note below.    Orthopedic Clinic Post-Operative Note    CHIEF COMPLAINT:   Chief Complaint   Patient presents with     Surgical Followup     DOS: 1/31/2022~Left trapezium excision with suture suspension~10 days postop       HISTORY OF PRESENT ILLNESS  Doing well.  Pain has been controlled.  Although she has been out of oxycodone for the last few nights.  She has difficulty sleeping.  Patient's past medical, surgical, social and family histories reviewed.     Past Medical History:   Diagnosis Date     Cancer (H)     melanoma of cheek     History of blood transfusion     with hysterectomy many years ago     Lyme disease      Motion sickness      Thyroid disease     partial thyroidectomy 8/2014       Past Surgical History:   Procedure Laterality Date     ARTHROSCOPY KNEE WITH MEDIAL MENISCECTOMY Left 9/20/2016    Procedure: ARTHROSCOPY KNEE WITH MEDIAL MENISCECTOMY;  Surgeon: Maximilian Ochoa DO;  [facility]     BIOPSY NODE SENTINEL N/A 8/26/2014    Procedure: BIOPSY NODE SENTINEL;  Surgeon: Eliza Foss MD;  [facility]     BLEPHAROPLASTY BILATERAL Bilateral 1/11/2018    Procedure: BLEPHAROPLASTY BILATERAL;  Bilateral upper eyelid blepharoplasty;  Surgeon: Jame Painter MD;  [facility]     CATARACT IOL, RT/LT Bilateral OD 12/22/16-OS 12/8/16     COLONOSCOPY  3/22/2013    Procedure: COLONOSCOPY;  colonoscopy;  Surgeon: Mike Lr MD;  [facility]     HYSTERECTOMY, PAP NO LONGER INDICATED       ORTHOPEDIC SURGERY      shoulder surgery     PHACOEMULSIFICATION WITH STANDARD INTRAOCULAR LENS IMPLANT Left 12/8/2016    Procedure: PHACOEMULSIFICATION WITH STANDARD INTRAOCULAR LENS IMPLANT;  Surgeon: Jame Painter,  MD;  [facility]     PHACOEMULSIFICATION WITH STANDARD INTRAOCULAR LENS IMPLANT Right 12/22/2016    Procedure: PHACOEMULSIFICATION WITH STANDARD INTRAOCULAR LENS IMPLANT;  Surgeon: Jame Painter MD;  [facility]     RECONSTRUCT NOSE STAGE ONE N/A 9/5/2014    Procedure: RECONSTRUCT NOSE STAGE ONE;  Surgeon: Levy Holbrook MD;  [facility]     REVISE SCAR FACE N/A 8/26/2014    Procedure: REVISE SCAR FACE;  Surgeon: Eliza Foss MD;  [facility]     SUTURE SUSPENSIONPLASTY THUMB Left 1/31/2022    Procedure: Left EXCISION, TRAPEZIUM, WITH SUTURE SUSPENSIONPLASTY;  Surgeon: Maximilian Ochoa DO;  [facility]     THYROIDECTOMY Left 8/26/2014    Procedure: THYROIDECTOMY;  Surgeon: Eliza Foss MD;  [facility]     ZZC TOTAL ABDOM HYSTERECTOMY  11/16/1993    TAHRWili TAVAREZ urethropexy       Medications:  acetaminophen (TYLENOL) 500 MG tablet, Take 650 mg by mouth every 6 hours as needed for mild pain  levothyroxine (SYNTHROID/LEVOTHROID) 50 MCG tablet, Take 1 tablet by mouth once daily  NAPROXEN PO, Take 660 mg by mouth 2 times daily as needed for moderate pain   pantoprazole (PROTONIX) 40 MG EC tablet, Take 1 tablet (40 mg) by mouth daily  simvastatin (ZOCOR) 20 MG tablet, Take 1 tablet by mouth once daily    No current facility-administered medications on file prior to visit.      Allergies   Allergen Reactions     No Known Drug Allergies        Social History     Occupational History     Not on file   Tobacco Use     Smoking status: Never Smoker     Smokeless tobacco: Never Used   Vaping Use     Vaping Use: Never used   Substance and Sexual Activity     Alcohol use: Yes     Alcohol/week: 0.0 standard drinks     Comment: occasional     Drug use: No     Sexual activity: Yes     Partners: Male     Birth control/protection: Surgical       Family History   Problem Relation Age of Onset     Cancer Mother 45        lung cancer     Cerebrovascular Disease Father       "   bulge in aorta     Heart Disease Maternal Grandfather      Heart Disease Paternal Grandfather      Cancer Sister         esophageal      Cancer Paternal Grandmother      Cancer Brother 65        Thyroid     Other Cancer Sister         lung         Physical Exam:  Vitals: /82   Temp 97.3  F (36.3  C) (Temporal)   Ht 1.626 m (5' 4\")   Wt 90 kg (198 lb 8 oz)   LMP  (LMP Unknown)   BMI 34.07 kg/m    BMI= Body mass index is 34.07 kg/m .  Constitutional: healthy, alert and no acute distress   Psychiatric: mentation appears normal and affect normal/bright  NEURO: no focal deficits  SKIN: .healing well, well approximated skin edges, without signs of infection including no erythema, incision breakdown or purlent drainage  JOINT/EXTREMITIES: Minimal swelling.  CMS intact.  Range of motion not fully tested.  No evidence of infection  GAIT: not tested     Diagnostic Modalities:  None today.  Independent visualization of the images was performed.      Impression:   Chief Complaint   Patient presents with     Surgical Followup     DOS: 1/31/2022~Left trapezium excision with suture suspension~10 days postop   Doing well.    Plan:   Transition to a thumb spica splint.  Start therapy-no resistance.  But work on range of motion and decrease inflammation    Small refill of oxycodone to be taken at night only.      Return to clinic 6, week(s), or sooner as needed for changes.    Re-x-ray on return: No    Britton Ochoa D.O.      Again, thank you for allowing me to participate in the care of your patient.        Sincerely,        Maximilian Ochoa, DO    "

## 2022-02-11 ENCOUNTER — DOCUMENTATION ONLY (OUTPATIENT)
Dept: OTHER | Facility: CLINIC | Age: 72
End: 2022-02-11
Payer: COMMERCIAL

## 2022-02-12 ENCOUNTER — HEALTH MAINTENANCE LETTER (OUTPATIENT)
Age: 72
End: 2022-02-12

## 2022-03-04 ENCOUNTER — HOSPITAL ENCOUNTER (OUTPATIENT)
Dept: OCCUPATIONAL THERAPY | Facility: CLINIC | Age: 72
Setting detail: THERAPIES SERIES
End: 2022-03-04
Attending: ORTHOPAEDIC SURGERY
Payer: COMMERCIAL

## 2022-03-04 DIAGNOSIS — M18.12 PRIMARY OSTEOARTHRITIS OF FIRST CARPOMETACARPAL JOINT OF LEFT HAND: Primary | ICD-10-CM

## 2022-03-04 PROCEDURE — 97760 ORTHOTIC MGMT&TRAING 1ST ENC: CPT | Mod: GO | Performed by: OCCUPATIONAL THERAPIST

## 2022-03-04 PROCEDURE — 97110 THERAPEUTIC EXERCISES: CPT | Mod: GO | Performed by: OCCUPATIONAL THERAPIST

## 2022-03-04 PROCEDURE — 97165 OT EVAL LOW COMPLEX 30 MIN: CPT | Mod: GO | Performed by: OCCUPATIONAL THERAPIST

## 2022-03-04 NOTE — PROGRESS NOTES
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  Pt still felt was putting pressure.  OT recommended a fabricated thumb spica to relieve pressure for pain reduction and healing.  OT fabricated a solide thermaplast volar forearm thumb spica splint with 2 strap closures.  2 thumb derma sleeves provided for comfort and decrease pressure areas.   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  Conservative measures with bracing and cortozone injections, without relief.  Surgical intervention deemed necessary.  The pt since surgery has had increased left thumb pain, without relief from bracing and medications.  She reported this date her brace has too much pressure on incision site and radial edge, which is exasperating the pain.  She reported trying removing support bar, padding,pain creams; with no success of reducing pain.  Decreased pain free functional use of the left hand for BADL/IADLs.   Previous treatment or current condition thumb spica   How/Where did it occur With repetition/overuse   Onset date of current episode/exacerbation 01/31/22   Date of surgery 01/31/22   Surgical procedure Left  "trapezium excision with suture suspension   Chronicity New   Hand Dominance Right   Affected side Left   Functional limitations perform activities of daily living;perform desired leisure / sports activities   Reported Symptoms Pain;Loss of Motion/Stiffness;Loss of strength;Edema;Numbness   Prior level of function Independent ADL;Independent IADL   Important Activities crocheting, knit,embroidery   Level of functions comments UEFI 22/80   [facility]   Patient/Family goals statement \"get rid of the pain\"   General observations  , Mirella present for OT eval   Fall Risk Screen   Fall screen completed by OT   Have you fallen 2 or more times in the past year? No   Have you fallen and had an injury in the past year? No   Is patient a fall risk? No   Abuse Screen (yes response referral indicated)   Feels Unsafe at Home or Work/School no   Feels Threatened by Someone no   Does Anyone Try to Keep You From Having Contact with Others or Doing Things Outside Your Home? no   Physical Signs of Abuse Present no   Pain   Pain Primary Pain Report   Primary Pain Report   Location left thumb   Radiation Hand   Pain Quality Sharp   Frequency Constant   Scale 8/10   Pain Is Same All Of The Time   Pain Is Exacerbated By Carrying;Lifting;Gripping;Pinching;Activity/movement   Pain Is Relieved By Splints;Nsaids,analgesics   Progression Since Onset Gradually Worsening   Edema   Overall  - Left Mild   Tenderness   Overall - Left moderate to severe incision site and radila edge of left thumb   ROM   ROM AROM   AROM   AROM Wrist;Thumb   Comments Full fist, full oppositional   Thumb   MP Extension - Left 0   MP Flexion - Left 0   IP Extension - Left WNL   IP Flexion - Left 37   RABD - Left 18   Wrist   Wrist Extension - Left 38   Wrist Flexion- Left 58   Wrist Supination- Left WNL   Wrist Pronation- Left WNL   Radial Deviation- Left 18   Ulnar Deviation- Left WNL   Sensation Findings   Sensation Findings Other (see " comments)   Sensation Comments Pt report, intact   Strength   Strength Strength  (not tested)   Education Assessment   Preferred Learning Style Listening;Demonstration   Barriers to Learning No barriers   Therapy Interventions   Planned Therapy Interventions Paraffin;ROM;Strengthening;Stretching;Manual Therapy;Splinting;Education of splint wear, care, fit and precautions;Scar Management;Edema Management;Desensitization;Self Care/Home Management;Adaptive Equipment Training;Home Program;Joint Protection Instruction   Therapy plan comments Fabricate thumb spica splint   Clinical Impression   Criteria for Skilled Therapeutic Interventions Met yes;treatment indicated   OT Diagnosis Decreased pain free functional use of the left hand/thumb for BADL/IADLs.   Influenced by the following impairments Pain;Edema;Decreased range of motion;Decreased strength   Assessment of Occupational Performance 5 or more Performance Deficits   Identified Performance Deficits dressing, bathing, grooming, meal prep/clean up, home mgmt, driving, leisure activties   Clinical Decision Making (Complexity) Low complexity   Therapy Frequency up to 8 visits   Predicted Duration of Therapy Intervention (days/wks) 3 months   Risks and Benefits of Treatment have been explained. Yes   Patient, Family & other staff in agreement with plan of care Yes   Hand Eval Goals   Hand Eval Goals 1;2;3;4   Hand Goal 1   Goal Identifier left thumb pain   Goal Description Rory will consistently rate left thumb pain no more then 2/10 on a 0-10 pain scale; at rest, with light grasp/pinch or movement; in order to improve ability for preformance of the left hand for daily tasks and activities.   Target Date 06/03/22   Hand Goal 2   Goal Identifier Left AROM   Goal Description Rory will have minimal pain and WFL all planes of movement of the left thumb, wrist and forearm; in order to have the mobility needed for grooming, hygiene, meal prep, home mgmt, computer work and  luis eduardo.   Target Date 06/03/22   Hand Goal 3   Goal Identifier Left hand strength   Goal Description Rory will increase left hand strength up to 20# as tested by POG and 10# pinch; as required for completing home mgmt tasks and activities.   Target Date 06/03/22   Hand Goal 4   Goal Identifier Left hand function   Goal Description Rory will increase UEFI score up to 70 points, as an indicator of improved functional use for BADL/IADLs.   Target Date 06/03/22   Total Evaluation Time   OT Eval, Low Complexity Minutes (44918) 30       Thank you for referring Rory  To OT services to assist with reduce pain and improve function.    If you have any questions or concerns, please contact me at [phone].    Nieves Hurtado MA, OTR/[facility]  [email or web address], [email or web address]

## 2022-03-07 ENCOUNTER — OFFICE VISIT (OUTPATIENT)
Dept: ORTHOPEDICS | Facility: CLINIC | Age: 72
End: 2022-03-07
Payer: COMMERCIAL

## 2022-03-07 VITALS
HEIGHT: 64 IN | SYSTOLIC BLOOD PRESSURE: 118 MMHG | DIASTOLIC BLOOD PRESSURE: 74 MMHG | BODY MASS INDEX: 33.8 KG/M2 | WEIGHT: 198 LBS

## 2022-03-07 DIAGNOSIS — M18.12 PRIMARY OSTEOARTHRITIS OF FIRST CARPOMETACARPAL JOINT OF LEFT HAND: Primary | ICD-10-CM

## 2022-03-07 PROCEDURE — 99024 POSTOP FOLLOW-UP VISIT: CPT | Performed by: ORTHOPAEDIC SURGERY

## 2022-03-07 ASSESSMENT — PAIN SCALES - GENERAL: PAINLEVEL: MILD PAIN (3)

## 2022-03-07 NOTE — LETTER
3/7/2022         RE: Elisabeth Voss  [address]   [place] [address]        Dear Colleague,    Thank you for referring your patient, Elisabeth Voss, to the [facility]. Please see a copy of my visit note below.    Orthopedic Clinic Post-Operative Note    CHIEF COMPLAINT:   Chief Complaint   Patient presents with     Surgical Followup     DOS: 1/31/2022~Left trapezium excision with suture suspension~6 week      RECHECK     left thumb follow up       HISTORY OF PRESENT ILLNESS  Doing well. Making progress. Pain is well controlled. Getting better.    Patient's past medical, surgical, social and family histories reviewed.     Past Medical History:   Diagnosis Date     Cancer (H)     melanoma of cheek     History of blood transfusion     with hysterectomy many years ago     Lyme disease      Motion sickness      Thyroid disease     partial thyroidectomy 8/2014       Past Surgical History:   Procedure Laterality Date     ARTHROSCOPY KNEE WITH MEDIAL MENISCECTOMY Left 9/20/2016    Procedure: ARTHROSCOPY KNEE WITH MEDIAL MENISCECTOMY;  Surgeon: Maximilian Ochoa DO;  [facility]     BIOPSY NODE SENTINEL N/A 8/26/2014    Procedure: BIOPSY NODE SENTINEL;  Surgeon: Eliza Foss MD;  [facility]     BLEPHAROPLASTY BILATERAL Bilateral 1/11/2018    Procedure: BLEPHAROPLASTY BILATERAL;  Bilateral upper eyelid blepharoplasty;  Surgeon: Jame Painter MD;  [facility]     CATARACT IOL, RT/LT Bilateral OD 12/22/16-OS 12/8/16     COLONOSCOPY  3/22/2013    Procedure: COLONOSCOPY;  colonoscopy;  Surgeon: Mike Lr MD;  [facility]     HYSTERECTOMY, PAP NO LONGER INDICATED       ORTHOPEDIC SURGERY      shoulder surgery     PHACOEMULSIFICATION WITH STANDARD INTRAOCULAR LENS IMPLANT Left 12/8/2016    Procedure: PHACOEMULSIFICATION WITH STANDARD INTRAOCULAR LENS IMPLANT;  Surgeon: Jame Painter MD;  [facility]      PHACOEMULSIFICATION WITH STANDARD INTRAOCULAR LENS IMPLANT Right 12/22/2016    Procedure: PHACOEMULSIFICATION WITH STANDARD INTRAOCULAR LENS IMPLANT;  Surgeon: Jame Painter MD;  [facility]     RECONSTRUCT NOSE STAGE ONE N/A 9/5/2014    Procedure: RECONSTRUCT NOSE STAGE ONE;  Surgeon: Levy Holbrook MD;  [facility]     REVISE SCAR FACE N/A 8/26/2014    Procedure: REVISE SCAR FACE;  Surgeon: Eliza Foss MD;  [facility]     SUTURE SUSPENSIONPLASTY THUMB Left 1/31/2022    Procedure: Left EXCISION, TRAPEZIUM, WITH SUTURE SUSPENSIONPLASTY;  Surgeon: Maximilian Ochoa DO;  [facility]     THYROIDECTOMY Left 8/26/2014    Procedure: THYROIDECTOMY;  Surgeon: Eliza Foss MD;  [facility]     ZZC TOTAL ABDOM HYSTERECTOMY  11/16/1993    Wili NEVILLE urethropexy       Medications:  acetaminophen (TYLENOL) 500 MG tablet, Take 650 mg by mouth every 6 hours as needed for mild pain  levothyroxine (SYNTHROID/LEVOTHROID) 50 MCG tablet, Take 1 tablet by mouth once daily  NAPROXEN PO, Take 660 mg by mouth 2 times daily as needed for moderate pain   pantoprazole (PROTONIX) 40 MG EC tablet, Take 1 tablet (40 mg) by mouth daily  simvastatin (ZOCOR) 20 MG tablet, Take 1 tablet by mouth once daily    No current facility-administered medications on file prior to visit.      Allergies   Allergen Reactions     No Known Drug Allergies        Social History     Occupational History     Not on file   Tobacco Use     Smoking status: Never Smoker     Smokeless tobacco: Never Used   Vaping Use     Vaping Use: Never used   Substance and Sexual Activity     Alcohol use: Yes     Alcohol/week: 0.0 standard drinks     Comment: occasional     Drug use: No     Sexual activity: Yes     Partners: Male     Birth control/protection: Surgical       Family History   Problem Relation Age of Onset     Cancer Mother 45        lung cancer     Cerebrovascular Disease Father         bulge in aorta      "Heart Disease Maternal Grandfather      Heart Disease Paternal Grandfather      Cancer Sister         esophageal      Cancer Paternal Grandmother      Cancer Brother 65        Thyroid     Other Cancer Sister         lung         Physical Exam:  Vitals: /74   Ht 1.626 m (5' 4\")   Wt 89.8 kg (198 lb)   LMP  (LMP Unknown)   BMI 33.99 kg/m    BMI= Body mass index is 33.99 kg/m .  Constitutional: healthy, alert and no acute distress   Psychiatric: mentation appears normal and affect normal/bright  NEURO: no focal deficits  SKIN: .well healed, no erythema, no incision breakdown and no drainage.  JOINT/EXTREMITIES: Some minor adhesions of the scar. Opposition of the thumb the small finger intact. No focal areas of tenderness. No evidence of infection. Fingers warm and dry with good cap refill.  GAIT: not tested     Diagnostic Modalities:  None today.  Independent visualization of the images was performed.      Impression:   Chief Complaint   Patient presents with     Surgical Followup     DOS: 1/31/2022~Left trapezium excision with suture suspension~6 week      RECHECK     left thumb follow up   Doing well    Plan: Continue therapy.  May discontinue bracing for the most part. Utilize it for any type of physical labor for another couple weeks.    Continue progressing. She is doing well. I provided her my colleagues information for follow-up as I will be departing the organization. She understands  Return to clinic 6, week(s), or sooner as needed for changes.    Re-x-ray on return: No    Britton Ochoa D.O.      Again, thank you for allowing me to participate in the care of your patient.        Sincerely,        Maximilian Ochoa, DO    "

## 2022-03-07 NOTE — PROGRESS NOTES
Orthopedic Clinic Post-Operative Note    CHIEF COMPLAINT:   Chief Complaint   Patient presents with     Surgical Followup     DOS: 1/31/2022~Left trapezium excision with suture suspension~6 week      RECHECK     left thumb follow up       HISTORY OF PRESENT ILLNESS  Doing well. Making progress. Pain is well controlled. Getting better.    Patient's past medical, surgical, social and family histories reviewed.     Past Medical History:   Diagnosis Date     Cancer (H)     melanoma of cheek     History of blood transfusion     with hysterectomy many years ago     Lyme disease      Motion sickness      Thyroid disease     partial thyroidectomy 8/2014       Past Surgical History:   Procedure Laterality Date     ARTHROSCOPY KNEE WITH MEDIAL MENISCECTOMY Left 9/20/2016    Procedure: ARTHROSCOPY KNEE WITH MEDIAL MENISCECTOMY;  Surgeon: Maximilian Ochoa DO;  Location: [facility]     BIOPSY NODE SENTINEL N/A 8/26/2014    Procedure: BIOPSY NODE SENTINEL;  Surgeon: Eliza Foss MD;  Location: [facility]     BLEPHAROPLASTY BILATERAL Bilateral 1/11/2018    Procedure: BLEPHAROPLASTY BILATERAL;  Bilateral upper eyelid blepharoplasty;  Surgeon: Jame Painter MD;  Location: [facility]     CATARACT IOL, RT/LT Bilateral OD 12/22/16-OS 12/8/16     COLONOSCOPY  3/22/2013    Procedure: COLONOSCOPY;  colonoscopy;  Surgeon: Mike Lr MD;  Location: [facility]     HYSTERECTOMY, PAP NO LONGER INDICATED       ORTHOPEDIC SURGERY      shoulder surgery     PHACOEMULSIFICATION WITH STANDARD INTRAOCULAR LENS IMPLANT Left 12/8/2016    Procedure: PHACOEMULSIFICATION WITH STANDARD INTRAOCULAR LENS IMPLANT;  Surgeon: Jame Painter MD;  Location: [facility]     PHACOEMULSIFICATION WITH STANDARD INTRAOCULAR LENS IMPLANT Right 12/22/2016    Procedure: PHACOEMULSIFICATION WITH STANDARD INTRAOCULAR LENS IMPLANT;  Surgeon: Jame Painter MD;  Location: [facility]     RECONSTRUCT NOSE STAGE ONE N/A 9/5/2014    Procedure:  RECONSTRUCT NOSE STAGE ONE;  Surgeon: Levy Holbrook MD;  [facility]: [facility]     REVISE SCAR FACE N/A 8/26/2014    Procedure: REVISE SCAR FACE;  Surgeon: Eliza Foss MD;  Location: [facility]     SUTURE SUSPENSIONPLASTY THUMB Left 1/31/2022    Procedure: Left EXCISION, TRAPEZIUM, WITH SUTURE SUSPENSIONPLASTY;  Surgeon: Maximilian Ochoa DO;  Location: [facility]     THYROIDECTOMY Left 8/26/2014    Procedure: THYROIDECTOMY;  Surgeon: Eliza Foss MD;  Location: [facility]     ZZC TOTAL ABDOM HYSTERECTOMY  11/16/1993    Wili NEVILLE urethropexy       Medications:  acetaminophen (TYLENOL) 500 MG tablet, Take 650 mg by mouth every 6 hours as needed for mild pain  levothyroxine (SYNTHROID/LEVOTHROID) 50 MCG tablet, Take 1 tablet by mouth once daily  NAPROXEN PO, Take 660 mg by mouth 2 times daily as needed for moderate pain   pantoprazole (PROTONIX) 40 MG EC tablet, Take 1 tablet (40 mg) by mouth daily  simvastatin (ZOCOR) 20 MG tablet, Take 1 tablet by mouth once daily    No current facility-administered medications on file prior to visit.      Allergies   Allergen Reactions     No Known Drug Allergies        Social History     Occupational History     Not on file   Tobacco Use     Smoking status: Never Smoker     Smokeless tobacco: Never Used   Vaping Use     Vaping Use: Never used   Substance and Sexual Activity     Alcohol use: Yes     Alcohol/week: 0.0 standard drinks     Comment: occasional     Drug use: No     Sexual activity: Yes     Partners: Male     Birth control/protection: Surgical       Family History   Problem Relation Age of Onset     Cancer Mother 45        lung cancer     Cerebrovascular Disease Father         bulge in aorta     Heart Disease Maternal Grandfather      Heart Disease Paternal Grandfather      Cancer Sister         esophageal      Cancer Paternal Grandmother      Cancer Brother 65        Thyroid     Other Cancer Sister         lung         Physical Exam:  Vitals: /74    "Ht 1.626 m (5' 4\")   Wt 89.8 kg (198 lb)   LMP  (LMP Unknown)   BMI 33.99 kg/m    BMI= Body mass index is 33.99 kg/m .  Constitutional: healthy, alert and no acute distress   Psychiatric: mentation appears normal and affect normal/bright  NEURO: no focal deficits  SKIN: .well healed, no erythema, no incision breakdown and no drainage.  JOINT/EXTREMITIES: Some minor adhesions of the scar. Opposition of the thumb the small finger intact. No focal areas of tenderness. No evidence of infection. Fingers warm and dry with good cap refill.  GAIT: not tested     Diagnostic Modalities:  None today.  Independent visualization of the images was performed.      Impression:   Chief Complaint   Patient presents with     Surgical Followup     DOS: 1/31/2022~Left trapezium excision with suture suspension~6 week      RECHECK     left thumb follow up   Doing well    Plan: Continue therapy.  May discontinue bracing for the most part. Utilize it for any type of physical labor for another couple weeks.    Continue progressing. She is doing well. I provided her my colleagues information for follow-up as I will be departing the organization. She understands  Return to clinic 6, week(s), or sooner as needed for changes.    Re-x-ray on return: No    Britton Ochoa D.O.  "

## 2022-03-10 ENCOUNTER — HOSPITAL ENCOUNTER (OUTPATIENT)
Dept: OCCUPATIONAL THERAPY | Facility: CLINIC | Age: 72
Setting detail: THERAPIES SERIES
Discharge: HOME | End: 2022-03-10
Attending: ORTHOPAEDIC SURGERY
Payer: COMMERCIAL

## 2022-03-10 PROCEDURE — 97110 THERAPEUTIC EXERCISES: CPT | Mod: GO | Performed by: OCCUPATIONAL THERAPIST

## 2022-03-10 PROCEDURE — 97018 PARAFFIN BATH THERAPY: CPT | Mod: GO | Performed by: OCCUPATIONAL THERAPIST

## 2022-03-16 NOTE — PROGRESS NOTES
[facility]      OUTPATIENT OCCUPATIONAL THERAPY HAND EVALUATION  PLAN OF TREATMENT FOR OUTPATIENT REHABILITATION  (COMPLETE FOR INITIAL CLAIMS ONLY)  Patient's Last Name, First Name, M.I.  YOB: 1950  Elisabeth Voss                        Provider s Name: [facility] Medical Record No.  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    Visits from SOC: 1      _________________________________________________________________________________  Plan of Treatment/Functional Goals:  Planned Therapy Interventions:  Paraffin, ROM, Strengthening, Stretching, Manual Therapy, Splinting, Education of splint wear, care, fit and precautions, Scar Management, Edema Management, Desensitization, Self Care/Home Management, Adaptive Equipment Training, Home Program, Joint Protection Instruction     Goals  1.  Goal Identifier: left thumb pain       Goal Description: Rory will consistently rate left thumb pain no more then 2/10 on a 0-10 pain scale; at rest, with light grasp/pinch or movement; in order to improve ability for preformance of the left hand for daily tasks and activities.       Target Date: 06/03/22   2. Goal Identifier: Left AROM       Goal Description: Rory will have minimal pain and WFL all planes of movement of the left thumb, wrist and forearm; in order to have the mobility needed for grooming, hygiene, meal prep, home mgmt, computer work and crocheting.       Target Date: 06/03/22   3. Goal Identifier: Left hand strength       Goal Description: Rory will increase left hand strength up to 20# as tested by POG and 10# pinch; as required for  completing home mgmt tasks and activities.       Target Date: 06/03/22   4. Goal Identifier: Left hand function       Goal Description: Rory will increase UEFI score up to 70 points, as an indicator of improved functional use for BADL/IADLs.       Target Date: 06/03/22              Treatment Frequency: Therapy Frequency: up to 8 visits  Predicated Duration of Therapy Intervention:  Predicted Duration of Therapy Intervention (days/wks): 3 months    Nieves Hurtado, OT         I CERTIFY THE NEED FOR THESE SERVICES FURNISHED UNDER        THIS PLAN OF TREATMENT AND WHILE UNDER MY CARE     (Physician co-signature of this document indicates review and certification of the therapy plan).                Certification Period:  03/04/22 to 06/03/22            Referring Physician:  Dr Maximilian Ochoa    Initial Assessment        See Epic Evaluation Start of Care Date: 03/04/22          Thank you for referring Rory  To OT services to assist with decrease pain and improve function.    If you have any questions or concerns, please contact me at [phone].    Nieves Hurtado MA, OTR/[facility]  [email or web address]

## 2022-03-17 ENCOUNTER — HOSPITAL ENCOUNTER (OUTPATIENT)
Dept: OCCUPATIONAL THERAPY | Facility: CLINIC | Age: 72
Setting detail: THERAPIES SERIES
Discharge: HOME | End: 2022-03-17
Attending: ORTHOPAEDIC SURGERY
Payer: COMMERCIAL

## 2022-03-17 PROCEDURE — 97110 THERAPEUTIC EXERCISES: CPT | Mod: GO | Performed by: OCCUPATIONAL THERAPIST

## 2022-03-17 PROCEDURE — 97018 PARAFFIN BATH THERAPY: CPT | Mod: GO | Performed by: OCCUPATIONAL THERAPIST

## 2022-03-22 ENCOUNTER — HOSPITAL ENCOUNTER (OUTPATIENT)
Dept: OCCUPATIONAL THERAPY | Facility: CLINIC | Age: 72
Setting detail: THERAPIES SERIES
Discharge: HOME | End: 2022-03-22
Attending: ORTHOPAEDIC SURGERY
Payer: COMMERCIAL

## 2022-03-22 PROCEDURE — 97035 APP MDLTY 1+ULTRASOUND EA 15: CPT | Mod: GO

## 2022-03-22 PROCEDURE — 97140 MANUAL THERAPY 1/> REGIONS: CPT | Mod: GO

## 2022-03-28 ENCOUNTER — HOSPITAL ENCOUNTER (OUTPATIENT)
Dept: OCCUPATIONAL THERAPY | Facility: CLINIC | Age: 72
Setting detail: THERAPIES SERIES
Discharge: HOME | End: 2022-03-28
Attending: ORTHOPAEDIC SURGERY
Payer: COMMERCIAL

## 2022-03-28 PROCEDURE — 97140 MANUAL THERAPY 1/> REGIONS: CPT | Mod: GO | Performed by: OCCUPATIONAL THERAPIST

## 2022-03-28 PROCEDURE — 97035 APP MDLTY 1+ULTRASOUND EA 15: CPT | Mod: GO | Performed by: OCCUPATIONAL THERAPIST

## 2022-04-03 DIAGNOSIS — E78.5 HYPERLIPIDEMIA LDL GOAL <160: ICD-10-CM

## 2022-04-03 DIAGNOSIS — K21.00 GASTROESOPHAGEAL REFLUX DISEASE WITH ESOPHAGITIS, UNSPECIFIED WHETHER HEMORRHAGE: ICD-10-CM

## 2022-04-03 DIAGNOSIS — E03.8 OTHER SPECIFIED HYPOTHYROIDISM: ICD-10-CM

## 2022-04-05 RX ORDER — SIMVASTATIN 20 MG
TABLET ORAL
Qty: 90 TABLET | Refills: 0 | Status: SHIPPED | OUTPATIENT
Start: 2022-04-05 | End: 2022-07-06

## 2022-04-05 RX ORDER — PANTOPRAZOLE SODIUM 40 MG/1
TABLET, DELAYED RELEASE ORAL
Qty: 90 TABLET | Refills: 3 | Status: SHIPPED | OUTPATIENT
Start: 2022-04-05 | End: 2023-04-04

## 2022-04-05 RX ORDER — LEVOTHYROXINE SODIUM 50 UG/1
TABLET ORAL
Qty: 90 TABLET | Refills: 0 | Status: SHIPPED | OUTPATIENT
Start: 2022-04-05 | End: 2022-07-06

## 2022-04-05 NOTE — TELEPHONE ENCOUNTER
Pending Prescriptions:                       Disp   Refills    pantoprazole (PROTONIX) 40 MG EC tablet [*90 tab*3            Sig: Take 1 tablet by mouth once daily    simvastatin (ZOCOR) 20 MG tablet [Pharmac*90 tab*0            Sig: Take 1 tablet by mouth once daily    levothyroxine (SYNTHROID/LEVOTHROID) 50 M*90 tab*0            Sig: Take 1 tablet by mouth once daily    Medication is being filled for 1 time rafiq refill only due to:  Patient is due for annual with fasting cholesterol and tsh labs    Please call and help schedule.  Thank you!

## 2022-04-06 ENCOUNTER — HOSPITAL ENCOUNTER (OUTPATIENT)
Dept: OCCUPATIONAL THERAPY | Facility: CLINIC | Age: 72
Setting detail: THERAPIES SERIES
Discharge: HOME | End: 2022-04-06
Attending: ORTHOPAEDIC SURGERY
Payer: COMMERCIAL

## 2022-04-06 PROCEDURE — 97110 THERAPEUTIC EXERCISES: CPT | Mod: GO | Performed by: OCCUPATIONAL THERAPIST

## 2022-04-06 PROCEDURE — 97140 MANUAL THERAPY 1/> REGIONS: CPT | Mod: GO,59 | Performed by: OCCUPATIONAL THERAPIST

## 2022-04-06 PROCEDURE — 97760 ORTHOTIC MGMT&TRAING 1ST ENC: CPT | Mod: GO | Performed by: OCCUPATIONAL THERAPIST

## 2022-05-20 ASSESSMENT — ENCOUNTER SYMPTOMS
FEVER: 0
NAUSEA: 0
HEARTBURN: 1
SHORTNESS OF BREATH: 0
ABDOMINAL PAIN: 0
HEADACHES: 0
EYE PAIN: 0
HEMATURIA: 0
NERVOUS/ANXIOUS: 0
BREAST MASS: 0
ARTHRALGIAS: 0
CHILLS: 1
WEAKNESS: 0
SORE THROAT: 0
JOINT SWELLING: 0
MYALGIAS: 0
DIARRHEA: 0
PALPITATIONS: 0
DYSURIA: 0
DIZZINESS: 0
COUGH: 0
FREQUENCY: 0
PARESTHESIAS: 0
CONSTIPATION: 0
HEMATOCHEZIA: 0

## 2022-05-20 ASSESSMENT — ACTIVITIES OF DAILY LIVING (ADL): CURRENT_FUNCTION: NO ASSISTANCE NEEDED

## 2022-05-26 ENCOUNTER — OFFICE VISIT (OUTPATIENT)
Dept: FAMILY MEDICINE | Facility: CLINIC | Age: 72
End: 2022-05-26
Payer: COMMERCIAL

## 2022-05-26 VITALS
OXYGEN SATURATION: 97 % | BODY MASS INDEX: 33.17 KG/M2 | HEIGHT: 64 IN | TEMPERATURE: 97.7 F | WEIGHT: 194.31 LBS | HEART RATE: 97 BPM | SYSTOLIC BLOOD PRESSURE: 118 MMHG | RESPIRATION RATE: 18 BRPM | DIASTOLIC BLOOD PRESSURE: 70 MMHG

## 2022-05-26 DIAGNOSIS — E03.9 HYPOTHYROIDISM, UNSPECIFIED TYPE: ICD-10-CM

## 2022-05-26 DIAGNOSIS — Z00.01 ENCOUNTER FOR GENERAL ADULT MEDICAL EXAMINATION WITH ABNORMAL FINDINGS: Primary | ICD-10-CM

## 2022-05-26 DIAGNOSIS — E78.5 HYPERLIPIDEMIA LDL GOAL <160: ICD-10-CM

## 2022-05-26 DIAGNOSIS — M18.12 PRIMARY OSTEOARTHRITIS OF FIRST CARPOMETACARPAL JOINT OF LEFT HAND: ICD-10-CM

## 2022-05-26 LAB
ALBUMIN SERPL-MCNC: 4.2 G/DL (ref 3.4–5)
ALP SERPL-CCNC: 74 U/L (ref 40–150)
ALT SERPL W P-5'-P-CCNC: 25 U/L (ref 0–50)
ANION GAP SERPL CALCULATED.3IONS-SCNC: 3 MMOL/L (ref 3–14)
AST SERPL W P-5'-P-CCNC: 21 U/L (ref 0–45)
BILIRUB SERPL-MCNC: 0.6 MG/DL (ref 0.2–1.3)
BUN SERPL-MCNC: 20 MG/DL (ref 7–30)
CALCIUM SERPL-MCNC: 9.3 MG/DL (ref 8.5–10.1)
CHLORIDE BLD-SCNC: 110 MMOL/L (ref 94–109)
CHOLEST SERPL-MCNC: 209 MG/DL
CO2 SERPL-SCNC: 29 MMOL/L (ref 20–32)
CREAT SERPL-MCNC: 1.07 MG/DL (ref 0.52–1.04)
FASTING STATUS PATIENT QL REPORTED: YES
GFR SERPL CREATININE-BSD FRML MDRD: 55 ML/MIN/1.73M2
GLUCOSE BLD-MCNC: 100 MG/DL (ref 70–99)
HDLC SERPL-MCNC: 71 MG/DL
LDLC SERPL CALC-MCNC: 116 MG/DL
NONHDLC SERPL-MCNC: 138 MG/DL
POTASSIUM BLD-SCNC: 4.1 MMOL/L (ref 3.4–5.3)
PROT SERPL-MCNC: 7.3 G/DL (ref 6.8–8.8)
SODIUM SERPL-SCNC: 142 MMOL/L (ref 133–144)
TRIGL SERPL-MCNC: 109 MG/DL
TSH SERPL DL<=0.005 MIU/L-ACNC: 2.52 MU/L (ref 0.4–4)

## 2022-05-26 PROCEDURE — 0054A COVID-19,PF,PFIZER (12+ YRS): CPT | Performed by: FAMILY MEDICINE

## 2022-05-26 PROCEDURE — 80061 LIPID PANEL: CPT | Performed by: FAMILY MEDICINE

## 2022-05-26 PROCEDURE — 84443 ASSAY THYROID STIM HORMONE: CPT | Performed by: FAMILY MEDICINE

## 2022-05-26 PROCEDURE — 36415 COLL VENOUS BLD VENIPUNCTURE: CPT | Performed by: FAMILY MEDICINE

## 2022-05-26 PROCEDURE — 99213 OFFICE O/P EST LOW 20 MIN: CPT | Mod: 25 | Performed by: FAMILY MEDICINE

## 2022-05-26 PROCEDURE — 91305 COVID-19,PF,PFIZER (12+ YRS): CPT | Performed by: FAMILY MEDICINE

## 2022-05-26 PROCEDURE — 99397 PER PM REEVAL EST PAT 65+ YR: CPT | Mod: 25 | Performed by: FAMILY MEDICINE

## 2022-05-26 PROCEDURE — 80053 COMPREHEN METABOLIC PANEL: CPT | Performed by: FAMILY MEDICINE

## 2022-05-26 ASSESSMENT — ENCOUNTER SYMPTOMS
MYALGIAS: 0
DIZZINESS: 0
ARTHRALGIAS: 0
BREAST MASS: 0
COUGH: 0
CONSTIPATION: 0
WEAKNESS: 0
CHILLS: 1
PARESTHESIAS: 0
ABDOMINAL PAIN: 0
HEADACHES: 0
NAUSEA: 0
DYSURIA: 0
HEMATOCHEZIA: 0
SORE THROAT: 0
PALPITATIONS: 0
DIARRHEA: 0
FEVER: 0
NERVOUS/ANXIOUS: 0
HEMATURIA: 0
SHORTNESS OF BREATH: 0
FREQUENCY: 0
HEARTBURN: 1
EYE PAIN: 0
JOINT SWELLING: 0

## 2022-05-26 ASSESSMENT — ACTIVITIES OF DAILY LIVING (ADL): CURRENT_FUNCTION: NO ASSISTANCE NEEDED

## 2022-05-26 ASSESSMENT — PAIN SCALES - GENERAL: PAINLEVEL: MILD PAIN (3)

## 2022-05-26 NOTE — PATIENT INSTRUCTIONS
Patient Education   Personalized Prevention Plan  You are due for the preventive services outlined below.  Your care team is available to assist you in scheduling these services.  If you have already completed any of these items, please share that information with your care team to update in your medical record.  Health Maintenance Due   Topic Date Due     Zoster (Shingles) Vaccine (1 of 2) Never done     Eye Exam  11/19/2019     COVID-19 Vaccine (4 - Booster for Pfizer series) 03/16/2022     Cholesterol Lab  04/16/2022       Understanding USDA MyPlate  The USDA has guidelines to help you make healthy food choices. These are called MyPlate. MyPlate shows the food groups that make up healthy meals using the image of a place setting. Before you eat, think about the healthiest choices for what to put on your plate or in your cup or bowl. To learn more about building a healthy plate, visit www.[employer] Biosystemsmyplate.gov.    The food groups    Fruits. Any fruit or 100% fruit juice counts as part of the Fruit Group. Fruits may be fresh, canned, frozen, or dried, and may be whole, cut-up, or pureed. Make 1/2 of your plate fruits and vegetables.    Vegetables. Any vegetable or 100% vegetable juice counts as a member of the Vegetable Group. Vegetables may be fresh, frozen, canned, or dried. They can be served raw or cooked and may be whole, cut-up, or mashed. Make 1/2 of your plate fruits and vegetables.    Grains. All foods made from grains are part of the Grains Group. These include wheat, rice, oats, cornmeal, and barley. Grains are often used to make foods such as bread, pasta, oatmeal, cereal, tortillas, and grits. Grains should be no more than 1/4 of your plate. At least half of your grains should be whole grains.    Protein. This group includes meat, poultry, seafood, beans and peas, eggs, processed soy products (such as tofu), nuts (including nut butters), and seeds. Make protein choices no more than 1/4 of your plate. Meat  and poultry choices should be lean or low fat.    Dairy. The Dairy Group includes all fluid milk products and foods made from milk that contain calcium, such as yogurt and cheese. (Foods that have little calcium, such as cream, butter, and cream cheese, are not part of this group.) Most dairy choices should be low-fat or fat-free.    Oils. Oils aren't a food group, but they do contain essential nutrients. However it's important to watch your intake of oils. These are fats that are liquid at room temperature. They include canola, corn, olive, soybean, vegetable, and sunflower oil. Foods that are mainly oil include mayonnaise, certain salad dressings, and soft margarines. You likely already get your daily oil allowance from the foods you eat.  Things to limit  Eating healthy also means limiting these things in your diet:       Salt (sodium). Many processed foods have a lot of sodium. To keep sodium intake down, eat fresh vegetables, meats, poultry, and seafood when possible. Purchase low-sodium, reduced-sodium, or no-salt-added food products at the store. And don't add salt to your meals at home. Instead, season them with herbs and spices such as dill, oregano, cumin, and paprika. Or try adding flavor with lemon or lime zest and juice.    Saturated fat. Saturated fats are most often found in animal products such as beef, pork, and chicken. They are often solid at room temperature, such as butter. To reduce your saturated fat intake, choose leaner cuts of meat and poultry. And try healthier cooking methods such as grilling, broiling, roasting, or baking. For a simple lower-fat swap, use plain nonfat yogurt instead of mayonnaise when making potato salad or macaroni salad.    Added sugars. These are sugars added to foods. They are in foods such as ice cream, candy, soda, fruit drinks, sports drinks, energy drinks, cookies, pastries, jams, and syrups. Cut down on added sugars by sharing sweet treats with a family member  or friend. You can also choose fruit for dessert, and drink water or other unsweetened beverages.     [employer] last reviewed this educational content on 6/1/2020 2000-2021 [employer]. All rights reserved. This information is not intended as a substitute for professional medical care. Always follow your healthcare professional's instructions.          Signs of Hearing Loss      Hearing much better with one ear can be a sign of hearing loss.   Hearing loss is a problem shared by many people. In fact, it is one of the most common health problems, particularly as people age. Most people age 65 and older have some hearing loss. By age 80, almost everyone does. Hearing loss often occurs slowly over the years. So you may not realize your hearing has gotten worse.  Have your hearing checked  Call your healthcare provider if you:    Have to strain to hear normal conversation    Have to watch other people s faces very carefully to follow what they re saying    Need to ask people to repeat what they ve said    Often misunderstand what people are saying    Turn the volume of the television or radio up so high that others complain    Feel that people are mumbling when they re talking to you    Find that the effort to hear leaves you feeling tired and irritated    Notice, when using the phone, that you hear better with one ear than the other  [employer] last reviewed this educational content on 1/1/2020 2000-2021 [employer]. All rights reserved. This information is not intended as a substitute for professional medical care. Always follow your healthcare professional's instructions.

## 2022-05-26 NOTE — PROGRESS NOTES
"SUBJECTIVE:   Elisabeth Voss is a 71 year old female who presents for Preventive Visit.        Patient has been advised of split billing requirements and indicates understanding: Yes  Are you in the first 12 months of your Medicare coverage?  No    Healthy Habits:     In general, how would you rate your overall health?  Good    Duration of exercise:  45-60 minutes    Do you usually eat at least 4 servings of fruit and vegetables a day, include whole grains    & fiber and avoid regularly eating high fat or \"junk\" foods?  No    Taking medications regularly:  Yes    Medication side effects:  Not applicable    Ability to successfully perform activities of daily living:  No assistance needed    Home Safety:  No safety concerns identified    Hearing Impairment:  Need to ask people to speak up or repeat themselves    In the past 6 months, have you been bothered by leaking of urine?  No    In general, how would you rate your overall mental or emotional health?  Good      PHQ-2 Total Score: 2    Additional concerns today:  No    Do you feel safe in your environment? Yes    Have you ever done Advance Care Planning? (For example, a Health Directive, POLST, or a discussion with a medical provider or your loved ones about your wishes): Yes, advance care planning is on file.       Fall risk  Fallen 2 or more times in the past year?: Yes  Any fall with injury in the past year?: No    Cognitive Screening      1) Repeat 3 items (Leader, Season, Table)    2) Clock draw: NORMAL  3) 3 item recall: Recalls 2 objects   Results: NORMAL clock, 1-2 items recalled: COGNITIVE IMPAIRMENT LESS LIKELY    Mini-CogTM Copyright SHAILESH Sandhu. Licensed by the author for use in [facility]; reprinted with permission (jorge [email or web address]). All rights reserved.      Do you have sleep apnea, excessive snoring or daytime drowsiness?: yes    Reviewed and updated as needed this visit by clinical staff   Tobacco  Allergies  Meds   Med Hx  Surg Hx  " Fam Hx  Soc Hx          Reviewed and updated as needed this visit by Provider                   Social History     Tobacco Use     Smoking status: Never Smoker     Smokeless tobacco: Never Used   Substance Use Topics     Alcohol use: Yes     Alcohol/week: 0.0 standard drinks     Comment: occasional         Alcohol Use 5/20/2022   Prescreen: >3 drinks/day or >7 drinks/week? No   Prescreen: >3 drinks/day or >7 drinks/week? -               Current providers sharing in care for this patient include:   Patient Care Team:  Yon Gallagher MD as PCP - General (Internal Medicine)  Dorothy Castellon MD as Assigned Cancer Care Provider  Yon Galalgher MD as Assigned PCP  Maximilian Ochoa DO as Assigned Musculoskeletal Provider  Yani Horan, RN as Specialty Care Coordinator (Hematology & Oncology)    The following health maintenance items are reviewed in Epic and correct as of today:  Health Maintenance Due   Topic Date Due     ANNUAL REVIEW OF HM ORDERS  Never done     ZOSTER IMMUNIZATION (1 of 2) Never done     EYE EXAM  11/19/2019     MEDICARE ANNUAL WELLNESS VISIT  03/05/2020     COVID-19 Vaccine (4 - Booster for Pfizer series) 03/16/2022     LIPID  04/16/2022     Lab work is in process  Mammogram Screening: Mammogram Screening: Recommended mammography every 1-2 years with patient discussion and risk factor consideration        Review of Systems   Constitutional: Positive for chills. Negative for fever.   HENT: Negative for congestion, ear pain, hearing loss and sore throat.    Eyes: Negative for pain and visual disturbance.   Respiratory: Negative for cough and shortness of breath.    Cardiovascular: Negative for chest pain, palpitations and peripheral edema.   Gastrointestinal: Positive for heartburn. Negative for abdominal pain, constipation, diarrhea, hematochezia and nausea.   Breasts:  Negative for tenderness, breast mass and discharge.   Genitourinary: Negative for dysuria, frequency, genital  "sores, hematuria, pelvic pain, urgency, vaginal bleeding and vaginal discharge.   Musculoskeletal: Negative for arthralgias, joint swelling and myalgias.   Skin: Negative for rash.   Neurological: Negative for dizziness, weakness, headaches and paresthesias.   Psychiatric/Behavioral: Negative for mood changes. The patient is not nervous/anxious.      Constitutional, HEENT, cardiovascular, pulmonary, GI, , musculoskeletal, neuro, skin, endocrine and psych systems are negative, except as otherwise noted.    OBJECTIVE:   /70   Pulse 97   Temp 97.7  F (36.5  C) (Temporal)   Resp 18   Ht 1.626 m (5' 4\")   Wt 88.1 kg (194 lb 5 oz)   LMP  (LMP Unknown)   SpO2 97%   Breastfeeding No   BMI 33.35 kg/m   Estimated body mass index is 33.35 kg/m  as calculated from the following:    Height as of this encounter: 1.626 m (5' 4\").    Weight as of this encounter: 88.1 kg (194 lb 5 oz).  Physical Exam  GENERAL: healthy, alert and no distress  EYES: Eyes grossly normal to inspection, PERRL and conjunctivae and sclerae normal  HENT: ear canals and TM's normal, nose and mouth without ulcers or lesions  NECK: no adenopathy, no asymmetry, masses, or scars and thyroid normal to palpation  RESP: lungs clear to auscultation - no rales, rhonchi or wheezes  CV: regular rate and rhythm, normal S1 S2, no S3 or S4, no murmur, click or rub, no peripheral edema and peripheral pulses strong  ABDOMEN: soft, nontender, no hepatosplenomegaly, no masses and bowel sounds normal  MS: Tenderness at first carpometacarpal joint of the left hand., no edema  SKIN: no suspicious lesions or rashes  NEURO: Normal strength and tone, mentation intact and speech normal  PSYCH: mentation appears normal, affect normal/bright    Diagnostic Test Results:  Labs reviewed in Epic  Results for orders placed or performed in visit on 05/26/22 (from the past 24 hour(s))   Lipid panel reflex to direct LDL Fasting   Result Value Ref Range    Cholesterol 209 (H) " <200 mg/dL    Triglycerides 109 <150 mg/dL    Direct Measure HDL 71 >=50 mg/dL    LDL Cholesterol Calculated 116 (H) <=100 mg/dL    Non HDL Cholesterol 138 (H) <130 mg/dL    Patient Fasting > 8hrs? Yes     Narrative    Cholesterol  Desirable:  <200 mg/dL    Triglycerides  Normal:  Less than 150 mg/dL  Borderline High:  150-199 mg/dL  High:  200-499 mg/dL  Very High:  Greater than or equal to 500 mg/dL    Direct Measure HDL  Female:  Greater than or equal to 50 mg/dL   Male:  Greater than or equal to 40 mg/dL    LDL Cholesterol  Desirable:  <100mg/dL  Above Desirable:  100-129 mg/dL   Borderline High:  130-159 mg/dL   High:  160-189 mg/dL   Very High:  >= 190 mg/dL    Non HDL Cholesterol  Desirable:  130 mg/dL  Above Desirable:  130-159 mg/dL  Borderline High:  160-189 mg/dL  High:  190-219 mg/dL  Very High:  Greater than or equal to 220 mg/dL   TSH with free T4 reflex   Result Value Ref Range    TSH 2.52 0.40 - 4.00 mU/L   Comprehensive metabolic panel (BMP + Alb, Alk Phos, ALT, AST, Total. Bili, TP)   Result Value Ref Range    Sodium 142 133 - 144 mmol/L    Potassium 4.1 3.4 - 5.3 mmol/L    Chloride 110 (H) 94 - 109 mmol/L    Carbon Dioxide (CO2) 29 20 - 32 mmol/L    Anion Gap 3 3 - 14 mmol/L    Urea Nitrogen 20 7 - 30 mg/dL    Creatinine 1.07 (H) 0.52 - 1.04 mg/dL    Calcium 9.3 8.5 - 10.1 mg/dL    Glucose 100 (H) 70 - 99 mg/dL    Alkaline Phosphatase 74 40 - 150 U/L    AST 21 0 - 45 U/L    ALT 25 0 - 50 U/L    Protein Total 7.3 6.8 - 8.8 g/dL    Albumin 4.2 3.4 - 5.0 g/dL    Bilirubin Total 0.6 0.2 - 1.3 mg/dL    GFR Estimate 55 (L) >60 mL/min/1.73m2       ASSESSMENT / PLAN:   (Z00.01) Encounter for general adult medical examination with abnormal findings  (primary encounter diagnosis)  Comment: Generally healthy see discussions below.  Plan:     (E78.5) Hyperlipidemia LDL goal <160  Comment: We will notify her of her results which are better than last year.  She is on simvastatin probably just keep her on the  "same dose.  Plan: Lipid panel reflex to direct LDL Fasting,         Comprehensive metabolic panel (BMP + Alb, Alk         Phos, ALT, AST, Total. Bili, TP)            (E03.9) Hypothyroidism, unspecified type  Comment: Her TSH is normal.  She was complaining of being cold.  We will inform her of the results.  Plan: TSH with free T4 reflex            (M18.12) Primary osteoarthritis of first carpometacarpal joint of left hand  Comment: This is really bothering her.  She is having a lot of pain and has to wear a brace at night or can hardly even sleep.  She feels its almost worse than it was.  Reviewed to have her see hand surgeon.  Plan: Orthopedic  Referral              Patient has been advised of split billing requirements and indicates understanding: Yes    COUNSELING:  Reviewed preventive health counseling, as reflected in patient instructions       Regular exercise       Healthy diet/nutrition    Estimated body mass index is 33.35 kg/m  as calculated from the following:    Height as of this encounter: 1.626 m (5' 4\").    Weight as of this encounter: 88.1 kg (194 lb 5 oz).    Weight management plan: Discussed healthy diet and exercise guidelines    She reports that she has never smoked. She has never used smokeless tobacco.      Appropriate preventive services were discussed with this patient, including applicable screening as appropriate for cardiovascular disease, diabetes, osteopenia/osteoporosis, and glaucoma.  As appropriate for age/gender, discussed screening for colorectal cancer, prostate cancer, breast cancer, and cervical cancer. Checklist reviewing preventive services available has been given to the patient.    Reviewed patients plan of care and provided an AVS. The Basic Care Plan (routine screening as documented in Health Maintenance) for Elisabeth meets the Care Plan requirement. This Care Plan has been established and reviewed with the Patient and spouse.    Counseling Resources:  ATP IV " Guidelines  Pooled Cohorts Equation Calculator  Breast Cancer Risk Calculator  Breast Cancer: Medication to Reduce Risk  FRAX Risk Assessment  ICSI Preventive Guidelines  Dietary Guidelines for Americans, 2010  USDA's MyPlate  ASA Prophylaxis  Lung CA Screening    Gerry Gaona MD  [facility]    Identified Health Risks:

## 2022-06-02 ENCOUNTER — HOSPITAL ENCOUNTER (OUTPATIENT)
Dept: MAMMOGRAPHY | Facility: CLINIC | Age: 72
Discharge: HOME | End: 2022-06-02
Attending: INTERNAL MEDICINE | Admitting: INTERNAL MEDICINE
Payer: COMMERCIAL

## 2022-06-02 DIAGNOSIS — Z12.31 VISIT FOR SCREENING MAMMOGRAM: ICD-10-CM

## 2022-06-02 PROCEDURE — 77067 SCR MAMMO BI INCL CAD: CPT

## 2022-08-15 ENCOUNTER — LAB (OUTPATIENT)
Dept: LAB | Facility: CLINIC | Age: 72
End: 2022-08-15
Payer: COMMERCIAL

## 2022-08-15 DIAGNOSIS — C43.30 MALIGNANT MELANOMA OF SKIN OF FACE (H): ICD-10-CM

## 2022-08-15 LAB
ALBUMIN SERPL-MCNC: 4.1 G/DL (ref 3.4–5)
ALP SERPL-CCNC: 58 U/L (ref 40–150)
ALT SERPL W P-5'-P-CCNC: 18 U/L (ref 0–50)
ANION GAP SERPL CALCULATED.3IONS-SCNC: 4 MMOL/L (ref 3–14)
AST SERPL W P-5'-P-CCNC: 16 U/L (ref 0–45)
BILIRUB SERPL-MCNC: 0.7 MG/DL (ref 0.2–1.3)
BUN SERPL-MCNC: 17 MG/DL (ref 7–30)
CALCIUM SERPL-MCNC: 9.1 MG/DL (ref 8.5–10.1)
CHLORIDE BLD-SCNC: 111 MMOL/L (ref 94–109)
CO2 SERPL-SCNC: 27 MMOL/L (ref 20–32)
CREAT SERPL-MCNC: 1 MG/DL (ref 0.52–1.04)
GFR SERPL CREATININE-BSD FRML MDRD: 59 ML/MIN/1.73M2
GLUCOSE BLD-MCNC: 91 MG/DL (ref 70–99)
LDH SERPL L TO P-CCNC: 189 U/L (ref 81–234)
POTASSIUM BLD-SCNC: 4.2 MMOL/L (ref 3.4–5.3)
PROT SERPL-MCNC: 7 G/DL (ref 6.8–8.8)
SODIUM SERPL-SCNC: 142 MMOL/L (ref 133–144)

## 2022-08-15 PROCEDURE — 36415 COLL VENOUS BLD VENIPUNCTURE: CPT | Performed by: INTERNAL MEDICINE

## 2022-08-15 PROCEDURE — 80053 COMPREHEN METABOLIC PANEL: CPT | Performed by: INTERNAL MEDICINE

## 2022-08-15 PROCEDURE — 83615 LACTATE (LD) (LDH) ENZYME: CPT | Performed by: INTERNAL MEDICINE

## 2022-08-15 NOTE — PROGRESS NOTES
Hematology/Oncology Visit    Oncologist: previously Dr. Castellon  Diagnosis: stage IA malignant melanoma  Reason for visit: yearly follow-up    Cancer and Treatment Hx:  Jun 2014: presented to PCP with changing mole on left lower jaw. No pain or LAD. Pathology from excision revealed 4mm invasive nodular malignant melanoma, Robe level III, no ulceration, negative margins. No LVI, Pathological staging T1a. Staging PET scan demonstrated with left thyroid nodule otherwise negative. CT of maxillofacial area also without concerning findings.    Interval History:  Rory has been doing well since her last oncology appt. No specifically worrying new skin lesions. Has noticed one flat pale brown spot on her left forearm. No other concerning spots or lymphadenopathy. No fevers, bone pains, or night sweats. Has intentionally been losing weight and reports being down almost 40lbs. She has been making better food choices and is doing yoga. She follows with Dermatology regularly. No questions or concerns today.    Medications and labs:  - Reviewed pertinent medications.  - Reviewed and discussed labs from 8/15/22: CMP without concerning findings    Physical Exam:   GEN: pleasantly conversant female no acute distress  SKIN: well healed scar left cheek and jaw without any palpable masses Numerous flat brown freckles and some larger flat light brown spots on general upper body skin assessment, no concerning findings.  LYMPH: no notable cervical, supraclavicular, or axillary lymphadenopathy  RESP: clear to auscultation bilaterally, on room air  CARDS: regular rate and rhythm, no notable murmurs   GI: abd soft without notable hepatosplenomegaly, non-tender to palpation  MSK: no notable lower extremity edema  NEURO: alert and oriented without obvious focal deficit, gait stable  /80 (BP Location: Right arm, Patient Position: Sitting, Cuff Size: Adult Regular)   Pulse 89   Temp 98.2  F (36.8  C) (Temporal)   Resp 15   Ht 1.626 m  "(5' 4\")   Wt 83.5 kg (184 lb 2 oz)   LMP  (LMP Unknown)   SpO2 97%   BMI 31.60 kg/m       Wt Readings from Last 4 Encounters:   08/16/22 83.5 kg (184 lb 2 oz)   05/26/22 88.1 kg (194 lb 5 oz)   03/07/22 89.8 kg (198 lb)   02/10/22 90 kg (198 lb 8 oz)     Assessment and Plan:  Malignant melanoma of the left face  - Status post excision June 2014  - Doing well, no concerning new skin lesions or lymphadenopathy  - Continues to follow with Dermatology regularly  - Will plan for Oncology provider visit with Survivorship appt in 1 year  - Labs ordered in 1 year but if general labs with PCP around same time then no need to get specific labs for oncology      The total time of this encounter amounted to 30 minutes today. This time includes face-to-face time spent with the patient, prep work, ordering tests, and performing post-visit documentation.  - Maria Del Rosario Maya, CNP    "

## 2022-08-16 ENCOUNTER — ONCOLOGY VISIT (OUTPATIENT)
Dept: ONCOLOGY | Facility: CLINIC | Age: 72
End: 2022-08-16
Payer: COMMERCIAL

## 2022-08-16 VITALS
SYSTOLIC BLOOD PRESSURE: 106 MMHG | RESPIRATION RATE: 15 BRPM | BODY MASS INDEX: 31.44 KG/M2 | WEIGHT: 184.13 LBS | HEART RATE: 89 BPM | TEMPERATURE: 98.2 F | OXYGEN SATURATION: 97 % | DIASTOLIC BLOOD PRESSURE: 80 MMHG | HEIGHT: 64 IN

## 2022-08-16 DIAGNOSIS — C43.30 MALIGNANT MELANOMA OF SKIN OF FACE (H): Primary | ICD-10-CM

## 2022-08-16 PROCEDURE — 99214 OFFICE O/P EST MOD 30 MIN: CPT | Performed by: NURSE PRACTITIONER

## 2022-08-16 ASSESSMENT — PAIN SCALES - GENERAL: PAINLEVEL: NO PAIN (0)

## 2022-08-16 NOTE — PATIENT INSTRUCTIONS
Today:  Please follow up as scheduled below:    Please follow up with Maria Del Rosario Maya.  Please complete labs prior to follow up appointment.    Lab Date/Time: 08/16/23 @ 8:30 am in [place]    Office visit follow up with Maria Del Rosario Maya on Date/Time: 08/16/23 @ 9:00 am in [place]     If you have any questions or concerns please feel free to call.    If you need to reschedule please call:  Clinic or Lab Appointment - [phone]  Infusion - [phone]  Imaging - [phone]    Asmita iLra CMA  [facility]   Oncology/Hematology  [facility]  [phone]    After Hours Oncology Nurse Line - [phone]

## 2022-08-16 NOTE — NURSING NOTE
DISCHARGE PLAN:  Next appointments: See patient instruction section  Departure Mode: Ambulatory  Accompanied by: self  5 minutes for nursing discharge (face to face time)     Elisabeth Voss is here today for oncology follow up.  Writing nurse seen patient after Medical Oncology appointment to address questions/concerns/coordinate care.   Appointments scheduled for labs and follow up with Maria Del Rosario Maya in 1 year.  See patient instructions and Oncologist's Progress note for further details. Questions and concerns addressed to patient's satisfaction. Patient verbalized and demonstrated understanding of plan.  Contact information provided and patient is encouraged to call with any that arise in the interim of care.    Asmita PHIPPS [facility]  [phone]  8/16/2022, 3:02 PM

## 2022-10-09 ENCOUNTER — HEALTH MAINTENANCE LETTER (OUTPATIENT)
Age: 72
End: 2022-10-09

## 2022-12-29 DIAGNOSIS — E78.5 HYPERLIPIDEMIA LDL GOAL <160: ICD-10-CM

## 2022-12-29 DIAGNOSIS — E03.8 OTHER SPECIFIED HYPOTHYROIDISM: ICD-10-CM

## 2023-01-03 RX ORDER — SIMVASTATIN 20 MG
TABLET ORAL
Qty: 90 TABLET | Refills: 1 | Status: SHIPPED | OUTPATIENT
Start: 2023-01-03 | End: 2023-07-03

## 2023-01-03 RX ORDER — LEVOTHYROXINE SODIUM 50 UG/1
TABLET ORAL
Qty: 90 TABLET | Refills: 1 | Status: SHIPPED | OUTPATIENT
Start: 2023-01-03 | End: 2023-07-03

## 2023-01-03 NOTE — TELEPHONE ENCOUNTER
"Zocor  Levothyroxine  Prescription approved per [facility] Refill Protocol.    Requested Prescriptions   Pending Prescriptions Disp Refills     levothyroxine (SYNTHROID/LEVOTHROID) 50 MCG tablet [Pharmacy Med Name: Levothyroxine Sodium 50 MCG Oral Tablet] 90 tablet 0     Sig: Take 1 tablet by mouth once daily       Thyroid Protocol Passed - 12/29/2022 12:17 PM        Passed - Patient is 12 years or older        Passed - Recent (12 mo) or future (30 days) visit within the authorizing provider's specialty     Patient has had an office visit with the authorizing provider or a provider within the authorizing providers department within the previous 12 mos or has a future within next 30 days. See \"Patient Info\" tab in inbasket, or \"Choose Columns\" in Meds & Orders section of the refill encounter.              Passed - Medication is active on med list        Passed - Normal TSH on file in past 12 months     Recent Labs   Lab Test 05/26/22  0805   TSH 2.52              Passed - No active pregnancy on record     If patient is pregnant or has had a positive pregnancy test, please check TSH.          Passed - No positive pregnancy test in past 12 months     If patient is pregnant or has had a positive pregnancy test, please check TSH.             simvastatin (ZOCOR) 20 MG tablet [Pharmacy Med Name: Simvastatin 20 MG Oral Tablet] 90 tablet 0     Sig: Take 1 tablet by mouth once daily       Statins Protocol Passed - 12/29/2022 12:17 PM        Passed - LDL on file in past 12 months     Recent Labs   Lab Test 05/26/22  0805   *             Passed - No abnormal creatine kinase in past 12 months     No lab results found.             Passed - Recent (12 mo) or future (30 days) visit within the authorizing provider's specialty     Patient has had an office visit with the authorizing provider or a provider within the authorizing providers department within the previous 12 mos or has a future within next 30 days. See \"Patient Info\" " "tab in inbasket, or \"Choose Columns\" in Meds & Orders section of the refill encounter.              Passed - Medication is active on med list        Passed - Patient is age 18 or older        Passed - No active pregnancy on record        Passed - No positive pregnancy test in past 12 months             "

## 2023-04-02 DIAGNOSIS — K21.00 GASTROESOPHAGEAL REFLUX DISEASE WITH ESOPHAGITIS, UNSPECIFIED WHETHER HEMORRHAGE: ICD-10-CM

## 2023-04-04 RX ORDER — PANTOPRAZOLE SODIUM 40 MG/1
TABLET, DELAYED RELEASE ORAL
Qty: 90 TABLET | Refills: 0 | Status: SHIPPED | OUTPATIENT
Start: 2023-04-04 | End: 2023-07-03

## 2023-04-04 NOTE — TELEPHONE ENCOUNTER
Pending Prescriptions:                       Disp   Refills    pantoprazole (PROTONIX) 40 MG EC tablet [*90 tab*0            Sig: Take 1 tablet by mouth once daily    Medication is being filled for 1 time rafiq refill only due to:  Patient is due for physical around 6/1/23.    Please call and help schedule.  Thank you!    Edita Anders RN on 4/4/2023 at 3:52 PM

## 2023-04-04 NOTE — TELEPHONE ENCOUNTER
Patient informed via mychart to schedule. 4/7 reminder if not read to send letter.     Closing encounter.   Gem Gaytan MA

## 2023-04-26 ENCOUNTER — PATIENT OUTREACH (OUTPATIENT)
Dept: CARE COORDINATION | Facility: CLINIC | Age: 73
End: 2023-04-26
Payer: COMMERCIAL

## 2023-05-08 NOTE — PROGRESS NOTES
Discharge Summary - Hand Therapy    Patient did not return to therapy.  Assume all goals were met to patient's satisfaction. D/C from hand therapy.  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   Goal Progress continued nerve pain   Target Date 06/03/22   Hand Goal 2   Goal Identifier Left AROM   Goal Description Rory will have minimal pain and WFL all planes of movement of the left thumb, wrist and forearm; in order to have the mobility needed for grooming, hygiene, meal prep, home mgmt, computer work and crocheting.   Goal Progress pain with AROM    Target Date 06/03/22   Hand Goal 3   Goal Identifier Left hand strength   Goal Description Rory will increase left hand strength up to 20# as tested by POG and 10# pinch; as required for completing home mgmt tasks and activities.   Goal Progress pain limiting strength   Target Date 06/03/22   Hand Goal 4   Goal Identifier Left hand function   Goal Description Rory will increase UEFI score up to 70 points, as an indicator of improved functional use for BADL/IADLs.   Target Date 06/03/22   Subjective Report   Subjective Report It is not quite as  painful with the tape.    Initial Pain level 7/10  (Note skin irritation from kinesiotape, pt removing too often)   Objective Measures   Objective Measures Objective Measure 1   Objective Measure 1   Objective Measure Pain   Details 7 out of 10    Modalities   Modalities Ultrasound    Ultrasound   Ultrasound, Minutes (23613)   (Not done due to skin irritation)   Therapeutic Procedure/Exercise   Therapeutic Procedure: strength, endurance, ROM, flexibillity minutes (70854) 10   Patient Response well tolerated   Progress reduced pain post tx   HAND Therapeutic Exercise ROM/AROM;Special Techniques   ROM/AROM   ROM/AROM AROM Thumb   AROM Thumb All Planes   Sets/Reps 1 set;10 reps   Other Exer/Activities/Educ   Exercise 1 OT educated and demonstrated how to obtain 30 degree angle in exercises in order to reinforce optimal positioning for thumb during functional tasks.    Description 1 Use 30 degree cut out on post-it note   Exercise 2 Pt to bring in splint next session    Special Techniques   Place and Hold 1 set;10 reps   Details Thumb   Manual Therapy   Manual Therapy Minutes (18773) 10   Skilled Intervention MFR to thenars and webspace of left thumb   Patient Response Tolerated   Treatment Detail Mild to moderate pressure   Orthotics   Type of Orthotic Hand based cmc   Wear Schedule day   Skilled Intervention OT   Patient Response improved comfort post adjustment   Treatment Detail adjusted splint    HAND Splinting Hand   Orthotic Management, Subsequent session minutes (42066) 10 Minutes   Equipment   Equipment K tape education   Education   Learner Patient   Readiness Acceptance   Method Explanation;Demonstration   Response Verbalizes understanding   Education Notes Educated patient on 30 degree angle to work towards with exercise program   Plan   Home program Continue with HEP and add 30 degree angle to thumb when participating in exercises.    Plan for next session review strengthening and self mobilization    Total  Session Time   Timed Code Treatment Minutes 30   Total Treatment Time (sum of timed and untimed services) 30   Medicare Claim Information   Date of surgery 01/31/22     Shavonne Eaton, MARY KATER/L, CHT

## 2023-05-15 NOTE — TELEPHONE ENCOUNTER
Prescription approved per [facility] Refill Protocol.  Jane Gaona RN       mid febuary

## 2023-06-05 ENCOUNTER — HOSPITAL ENCOUNTER (OUTPATIENT)
Dept: MAMMOGRAPHY | Facility: CLINIC | Age: 73
Discharge: HOME | End: 2023-06-05
Attending: INTERNAL MEDICINE | Admitting: INTERNAL MEDICINE
Payer: COMMERCIAL

## 2023-06-05 DIAGNOSIS — Z12.31 VISIT FOR SCREENING MAMMOGRAM: ICD-10-CM

## 2023-06-05 PROCEDURE — 77067 SCR MAMMO BI INCL CAD: CPT

## 2023-07-01 DIAGNOSIS — E03.8 OTHER SPECIFIED HYPOTHYROIDISM: ICD-10-CM

## 2023-07-01 DIAGNOSIS — E78.5 HYPERLIPIDEMIA LDL GOAL <160: ICD-10-CM

## 2023-07-01 DIAGNOSIS — K21.00 GASTROESOPHAGEAL REFLUX DISEASE WITH ESOPHAGITIS, UNSPECIFIED WHETHER HEMORRHAGE: ICD-10-CM

## 2023-07-03 RX ORDER — PANTOPRAZOLE SODIUM 40 MG/1
TABLET, DELAYED RELEASE ORAL
Qty: 90 TABLET | Refills: 0 | Status: SHIPPED | OUTPATIENT
Start: 2023-07-03 | End: 2023-10-06

## 2023-07-03 RX ORDER — SIMVASTATIN 20 MG
TABLET ORAL
Qty: 90 TABLET | Refills: 0 | Status: SHIPPED | OUTPATIENT
Start: 2023-07-03 | End: 2023-10-06

## 2023-07-03 RX ORDER — LEVOTHYROXINE SODIUM 50 UG/1
TABLET ORAL
Qty: 90 TABLET | Refills: 0 | Status: SHIPPED | OUTPATIENT
Start: 2023-07-03 | End: 2023-10-06

## 2023-07-03 NOTE — TELEPHONE ENCOUNTER
Prescription approved per [facility] Refill Protocol.    Desire refill given as patient has upcoming appt.    Ladonna Acevedo,TRACIN, RN

## 2023-07-25 ASSESSMENT — ENCOUNTER SYMPTOMS
CONSTIPATION: 0
ARTHRALGIAS: 0
NAUSEA: 0
HEMATURIA: 0
CHILLS: 0
NERVOUS/ANXIOUS: 0
SORE THROAT: 0
HEADACHES: 0
HEARTBURN: 0
WEAKNESS: 0
MYALGIAS: 0
FEVER: 0
PALPITATIONS: 0
JOINT SWELLING: 0
FREQUENCY: 0
HEMATOCHEZIA: 0
DIZZINESS: 0
DIARRHEA: 0
BREAST MASS: 0
SHORTNESS OF BREATH: 0
COUGH: 0
EYE PAIN: 0
PARESTHESIAS: 0
DYSURIA: 0
ABDOMINAL PAIN: 0

## 2023-07-25 ASSESSMENT — ACTIVITIES OF DAILY LIVING (ADL): CURRENT_FUNCTION: NO ASSISTANCE NEEDED

## 2023-07-26 ENCOUNTER — OFFICE VISIT (OUTPATIENT)
Dept: FAMILY MEDICINE | Facility: OTHER | Age: 73
End: 2023-07-26
Payer: COMMERCIAL

## 2023-07-26 VITALS
WEIGHT: 169.31 LBS | RESPIRATION RATE: 16 BRPM | BODY MASS INDEX: 28.91 KG/M2 | SYSTOLIC BLOOD PRESSURE: 108 MMHG | DIASTOLIC BLOOD PRESSURE: 72 MMHG | HEART RATE: 77 BPM | OXYGEN SATURATION: 97 % | HEIGHT: 64 IN | TEMPERATURE: 97.7 F

## 2023-07-26 DIAGNOSIS — E78.5 HYPERLIPIDEMIA LDL GOAL <160: ICD-10-CM

## 2023-07-26 DIAGNOSIS — K21.00 GASTROESOPHAGEAL REFLUX DISEASE WITH ESOPHAGITIS, UNSPECIFIED WHETHER HEMORRHAGE: ICD-10-CM

## 2023-07-26 DIAGNOSIS — R53.83 OTHER FATIGUE: ICD-10-CM

## 2023-07-26 DIAGNOSIS — E03.9 HYPOTHYROIDISM, UNSPECIFIED TYPE: ICD-10-CM

## 2023-07-26 DIAGNOSIS — Z98.49 HISTORY OF CATARACT EXTRACTION, UNSPECIFIED LATERALITY: ICD-10-CM

## 2023-07-26 DIAGNOSIS — M72.2 PLANTAR FASCIITIS: ICD-10-CM

## 2023-07-26 DIAGNOSIS — Z00.00 ENCOUNTER FOR MEDICARE ANNUAL WELLNESS EXAM: Primary | ICD-10-CM

## 2023-07-26 DIAGNOSIS — Z12.11 SPECIAL SCREENING FOR MALIGNANT NEOPLASMS, COLON: ICD-10-CM

## 2023-07-26 DIAGNOSIS — D32.9 MENINGIOMA (H): ICD-10-CM

## 2023-07-26 DIAGNOSIS — C43.30 MALIGNANT MELANOMA OF SKIN OF FACE (H): ICD-10-CM

## 2023-07-26 LAB
ALBUMIN SERPL BCG-MCNC: 4.5 G/DL (ref 3.5–5.2)
ALBUMIN UR-MCNC: NEGATIVE MG/DL
ALP SERPL-CCNC: 63 U/L (ref 35–104)
ALT SERPL W P-5'-P-CCNC: 19 U/L (ref 0–50)
ANION GAP SERPL CALCULATED.3IONS-SCNC: 9 MMOL/L (ref 7–15)
APPEARANCE UR: CLEAR
AST SERPL W P-5'-P-CCNC: 23 U/L (ref 0–45)
BASOPHILS # BLD AUTO: 0 10E3/UL (ref 0–0.2)
BASOPHILS NFR BLD AUTO: 0 %
BILIRUB SERPL-MCNC: 0.5 MG/DL
BILIRUB UR QL STRIP: ABNORMAL
BUN SERPL-MCNC: 14.5 MG/DL (ref 8–23)
CALCIUM SERPL-MCNC: 9.6 MG/DL (ref 8.8–10.2)
CHLORIDE SERPL-SCNC: 105 MMOL/L (ref 98–107)
CHOLEST SERPL-MCNC: 207 MG/DL
COLOR UR AUTO: YELLOW
CREAT SERPL-MCNC: 0.98 MG/DL (ref 0.51–0.95)
DEPRECATED HCO3 PLAS-SCNC: 28 MMOL/L (ref 22–29)
EOSINOPHIL # BLD AUTO: 0.1 10E3/UL (ref 0–0.7)
EOSINOPHIL NFR BLD AUTO: 2 %
ERYTHROCYTE [DISTWIDTH] IN BLOOD BY AUTOMATED COUNT: 14.4 % (ref 10–15)
GFR SERPL CREATININE-BSD FRML MDRD: 60 ML/MIN/1.73M2
GLUCOSE SERPL-MCNC: 92 MG/DL (ref 70–99)
GLUCOSE UR STRIP-MCNC: NEGATIVE MG/DL
HCT VFR BLD AUTO: 44 % (ref 35–47)
HDLC SERPL-MCNC: 74 MG/DL
HGB BLD-MCNC: 14.3 G/DL (ref 11.7–15.7)
HGB UR QL STRIP: NEGATIVE
IMM GRANULOCYTES # BLD: 0 10E3/UL
IMM GRANULOCYTES NFR BLD: 0 %
KETONES UR STRIP-MCNC: NEGATIVE MG/DL
LDLC SERPL CALC-MCNC: 114 MG/DL
LEUKOCYTE ESTERASE UR QL STRIP: NEGATIVE
LYMPHOCYTES # BLD AUTO: 1.8 10E3/UL (ref 0.8–5.3)
LYMPHOCYTES NFR BLD AUTO: 39 %
MCH RBC QN AUTO: 29.4 PG (ref 26.5–33)
MCHC RBC AUTO-ENTMCNC: 32.5 G/DL (ref 31.5–36.5)
MCV RBC AUTO: 91 FL (ref 78–100)
MONOCYTES # BLD AUTO: 0.4 10E3/UL (ref 0–1.3)
MONOCYTES NFR BLD AUTO: 8 %
NEUTROPHILS # BLD AUTO: 2.3 10E3/UL (ref 1.6–8.3)
NEUTROPHILS NFR BLD AUTO: 50 %
NITRATE UR QL: NEGATIVE
NONHDLC SERPL-MCNC: 133 MG/DL
PH UR STRIP: 5.5 [PH] (ref 5–7)
PLATELET # BLD AUTO: 259 10E3/UL (ref 150–450)
POTASSIUM SERPL-SCNC: 3.7 MMOL/L (ref 3.4–5.3)
PROT SERPL-MCNC: 7.1 G/DL (ref 6.4–8.3)
RBC # BLD AUTO: 4.86 10E6/UL (ref 3.8–5.2)
SODIUM SERPL-SCNC: 142 MMOL/L (ref 136–145)
SP GR UR STRIP: 1.02 (ref 1–1.03)
TRIGL SERPL-MCNC: 97 MG/DL
TSH SERPL DL<=0.005 MIU/L-ACNC: 1.35 UIU/ML (ref 0.3–4.2)
UROBILINOGEN UR STRIP-ACNC: 0.2 E.U./DL
WBC # BLD AUTO: 4.5 10E3/UL (ref 4–11)

## 2023-07-26 PROCEDURE — 80061 LIPID PANEL: CPT | Performed by: NURSE PRACTITIONER

## 2023-07-26 PROCEDURE — 84443 ASSAY THYROID STIM HORMONE: CPT | Performed by: NURSE PRACTITIONER

## 2023-07-26 PROCEDURE — 85025 COMPLETE CBC W/AUTO DIFF WBC: CPT | Performed by: NURSE PRACTITIONER

## 2023-07-26 PROCEDURE — 99214 OFFICE O/P EST MOD 30 MIN: CPT | Mod: 25 | Performed by: NURSE PRACTITIONER

## 2023-07-26 PROCEDURE — 81003 URINALYSIS AUTO W/O SCOPE: CPT | Performed by: NURSE PRACTITIONER

## 2023-07-26 PROCEDURE — G0439 PPPS, SUBSEQ VISIT: HCPCS | Performed by: NURSE PRACTITIONER

## 2023-07-26 PROCEDURE — 80053 COMPREHEN METABOLIC PANEL: CPT | Performed by: NURSE PRACTITIONER

## 2023-07-26 PROCEDURE — 36415 COLL VENOUS BLD VENIPUNCTURE: CPT | Performed by: NURSE PRACTITIONER

## 2023-07-26 ASSESSMENT — ENCOUNTER SYMPTOMS
HEADACHES: 0
JOINT SWELLING: 0
NAUSEA: 0
MYALGIAS: 0
FREQUENCY: 0
ABDOMINAL PAIN: 0
DIARRHEA: 0
WEAKNESS: 0
CHILLS: 0
ARTHRALGIAS: 0
COUGH: 0
HEMATURIA: 0
SORE THROAT: 0
FEVER: 0
DIZZINESS: 0
PARESTHESIAS: 0
CONSTIPATION: 0
HEMATOCHEZIA: 0
EYE PAIN: 0
NERVOUS/ANXIOUS: 0
BREAST MASS: 0
DYSURIA: 0
SHORTNESS OF BREATH: 0
PALPITATIONS: 0
HEARTBURN: 0

## 2023-07-26 ASSESSMENT — PAIN SCALES - GENERAL: PAINLEVEL: NO PAIN (0)

## 2023-07-26 ASSESSMENT — ACTIVITIES OF DAILY LIVING (ADL): CURRENT_FUNCTION: NO ASSISTANCE NEEDED

## 2023-07-26 NOTE — PROGRESS NOTES
"SUBJECTIVE:   Rory is a 73 year old who presents for Preventive Visit.      7/26/2023     7:54 AM   Additional Questions   Roomed by Esme     Are you in the first 12 months of your Medicare coverage?  No    Healthy Habits:     In general, how would you rate your overall health?  Good    Frequency of exercise:  2-3 days/week    Duration of exercise:  45-60 minutes    Do you usually eat at least 4 servings of fruit and vegetables a day, include whole grains    & fiber and avoid regularly eating high fat or \"junk\" foods?  Yes    Taking medications regularly:  Yes    Medication side effects:  None    Ability to successfully perform activities of daily living:  No assistance needed    Home Safety:  No safety concerns identified    Hearing Impairment:  Difficult to understand a speaker at a public meeting or [employer] service and difficulty understanding soft or whispered speech    In the past 6 months, have you been bothered by leaking of urine?  No    In general, how would you rate your overall mental or emotional health?  Good    Additional concerns today:  Yes    Wt Readings from Last 2 Encounters:   07/26/23 76.8 kg (169 lb 5 oz)   08/16/22 83.5 kg (184 lb 2 oz)       Feeling fatigue.   For the last week or so.   No urine symptoms. No chills.   No fevers.   No history of anemia.     Have you ever done Advance Care Planning? (For example, a Health Directive, POLST, or a discussion with a medical provider or your loved ones about your wishes): Yes, patient states has an Advance Care Planning document and will bring a copy to the clinic.       Fall risk  Fallen 2 or more times in the past year?: Yes  Any fall with injury in the past year?: Yes    Cognitive Screening   1) Repeat 3 items (Leader, Season, Table)    2) Clock draw: NORMAL  3) 3 item recall: Recalls 3 objects  Results: 3 items recalled: COGNITIVE IMPAIRMENT LESS LIKELY    Mini-CogTM Copyright [employer]. Licensed by the author for use in [employer] " Services; reprinted with permission (soob@.[email or web address]). All rights reserved.      Do you have sleep apnea, excessive snoring or daytime drowsiness? : no    Reviewed and updated as needed this visit by clinical staff   Tobacco  Allergies  Meds              Reviewed and updated as needed this visit by Provider                 Social History     Tobacco Use    Smoking status: Never    Smokeless tobacco: Never   Substance Use Topics    Alcohol use: Yes     Comment: 1 beer a month             7/25/2023     9:02 PM   Alcohol Use   Prescreen: >3 drinks/day or >7 drinks/week? No     Do you have a current opioid prescription? No  Do you use any other controlled substances or medications that are not prescribed by a provider? None            Current providers sharing in care for this patient include:  Patient Care Team:  Frannie Bernstein APRN CNP as PCP - General (Family Medicine)  Maximilian Ochoa DO as Assigned Musculoskeletal Provider  Yani Horan RN as Specialty Care Coordinator (Hematology & Oncology)  Maria Del Rosario Maya APRN CNP as Assigned Cancer Care Provider  Yon Gallagher MD as Assigned PCP    The following health maintenance items are reviewed in Epic and correct as of today:  Health Maintenance   Topic Date Due    ZOSTER IMMUNIZATION (1 of 2) Never done    EYE EXAM  11/19/2019    COVID-19 Vaccine (6 - Pfizer series) 02/08/2023    COLORECTAL CANCER SCREENING  03/22/2023    LIPID  05/26/2023    TSH W/FREE T4 REFLEX  05/26/2023    INFLUENZA VACCINE (1) 09/01/2023    MEDICARE ANNUAL WELLNESS VISIT  07/26/2024    ANNUAL REVIEW OF HM ORDERS  07/26/2024    FALL RISK ASSESSMENT  07/26/2024    MAMMO SCREENING  06/05/2025    DTAP/TDAP/TD IMMUNIZATION (2 - Td or Tdap) 02/23/2026    ADVANCE CARE PLANNING  07/26/2028    DEXA  01/24/2029    HEPATITIS C SCREENING  Completed    PHQ-2 (once per calendar year)  Completed    Pneumococcal Vaccine: 65+ Years  Completed    IPV IMMUNIZATION  Aged Out    MENINGITIS  "IMMUNIZATION  Aged Out     Lab work is in process          Review of Systems   Constitutional:  Negative for chills and fever.   HENT:  Negative for congestion, ear pain, hearing loss and sore throat.    Eyes:  Negative for pain and visual disturbance.   Respiratory:  Negative for cough and shortness of breath.    Cardiovascular:  Negative for chest pain, palpitations and peripheral edema.   Gastrointestinal:  Negative for abdominal pain, constipation, diarrhea, heartburn, hematochezia and nausea.   Breasts:  Negative for tenderness, breast mass and discharge.   Genitourinary:  Negative for dysuria, frequency, genital sores, hematuria, pelvic pain, urgency, vaginal bleeding and vaginal discharge.   Musculoskeletal:  Negative for arthralgias, joint swelling and myalgias.   Skin:  Negative for rash.   Neurological:  Negative for dizziness, weakness, headaches and paresthesias.   Psychiatric/Behavioral:  Negative for mood changes. The patient is not nervous/anxious.        OBJECTIVE:   /72   Pulse 77   Temp 97.7  F (36.5  C) (Temporal)   Resp 16   Ht 1.626 m (5' 4\")   Wt 76.8 kg (169 lb 5 oz)   LMP  (LMP Unknown)   SpO2 97%   BMI 29.06 kg/m   Estimated body mass index is 29.06 kg/m  as calculated from the following:    Height as of this encounter: 1.626 m (5' 4\").    Weight as of this encounter: 76.8 kg (169 lb 5 oz).  Physical Exam  GENERAL APPEARANCE: healthy, alert and no distress  EYES: Eyes grossly normal to inspection, PERRL and conjunctivae and sclerae normal  HENT: ear canals and TM's normal, nose and mouth without ulcers or lesions, oropharynx clear and oral mucous membranes moist  NECK: no adenopathy, no asymmetry, masses, or scars and thyroid normal to palpation  RESP: lungs clear to auscultation - no rales, rhonchi or wheezes  BREAST: normal without masses, tenderness or nipple discharge and no palpable axillary masses or adenopathy  CV: regular rate and rhythm, normal S1 S2, no S3 or S4, no " murmur, click or rub, no peripheral edema and peripheral pulses strong  ABDOMEN: soft, nontender, no hepatosplenomegaly, no masses and bowel sounds normal  MS: no musculoskeletal defects are noted and gait is age appropriate without ataxia  SKIN: no suspicious lesions or rashes to visible skin  NEURO: Normal strength and tone, sensory exam grossly normal, mentation intact and speech normal  PSYCH: mentation appears normal and affect normal/bright    Diagnostic Test Results:  Labs reviewed in Epic  Results for orders placed or performed in visit on 07/26/23 (from the past 24 hour(s))   CBC with platelets and differential    Narrative    The following orders were created for panel order CBC with platelets and differential.  Procedure                               Abnormality         Status                     ---------                               -----------         ------                     CBC with platelets and d...[[id number]]                      Final result                 Please view results for these tests on the individual orders.   CBC with platelets and differential   Result Value Ref Range    WBC Count 4.5 4.0 - 11.0 10e3/uL    RBC Count 4.86 3.80 - 5.20 10e6/uL    Hemoglobin 14.3 11.7 - 15.7 g/dL    Hematocrit 44.0 35.0 - 47.0 %    MCV 91 78 - 100 fL    MCH 29.4 26.5 - 33.0 pg    MCHC 32.5 31.5 - 36.5 g/dL    RDW 14.4 10.0 - 15.0 %    Platelet Count 259 150 - 450 10e3/uL    % Neutrophils 50 %    % Lymphocytes 39 %    % Monocytes 8 %    % Eosinophils 2 %    % Basophils 0 %    % Immature Granulocytes 0 %    Absolute Neutrophils 2.3 1.6 - 8.3 10e3/uL    Absolute Lymphocytes 1.8 0.8 - 5.3 10e3/uL    Absolute Monocytes 0.4 0.0 - 1.3 10e3/uL    Absolute Eosinophils 0.1 0.0 - 0.7 10e3/uL    Absolute Basophils 0.0 0.0 - 0.2 10e3/uL    Absolute Immature Granulocytes 0.0 <=0.4 10e3/uL       ASSESSMENT / PLAN:   (Z00.00) Encounter for Medicare annual wellness exam  (primary encounter diagnosis)  Comment:   Plan:  Updated      (C43.30) Malignant melanoma of skin of face (H)  Comment:   Plan: Followed by oncology/hematology  Due for follow up in 1 year.     (E78.5) Hyperlipidemia LDL goal <160  Comment:   Plan: Lipid panel reflex to direct LDL Fasting,         Comprehensive metabolic panel (BMP + Alb, Alk         Phos, ALT, AST, Total. Bili, TP)        The 10-year ASCVD risk score (Fernanda FERNANDEZ, et al., 2019) is: 10.6%    Values used to calculate the score:      Age: 73 years      Sex: Female      Is Non- : No      Diabetic: No      Tobacco smoker: No      Systolic Blood Pressure: 108 mmHg      Is BP treated: No      HDL Cholesterol: 74 mg/dL      Total Cholesterol: 207 mg/dL  Continue on statin therapy, labs today   Refill today  Level 4    (E03.9) Hypothyroidism, unspecified type  Comment:   Plan: TSH with free T4 reflex        Recheck today  Having some fatigue so will see what thyroid is at  Level 4     (K21.00) Gastroesophageal reflux disease with esophagitis, unspecified whether hemorrhage  Comment  Plan: Comprehensive metabolic panel (BMP + Alb, Alk         Phos, ALT, AST, Total. Bili, TP)            (Z12.11) Special screening for malignant neoplasms, colon  Comment:   Plan: Colonoscopy Screening  Referral            (R53.83) Other fatigue  Comment:   Plan: UA Macroscopic with reflex to Microscopic and         Culture - Lab Collect, CBC with platelets and         differential        Uncertain cause   X1 week  Could be viral illness vs new diet and weight loss  Recommend labs and UMAC   Follow up with me if no improvements. She can mychart me for a visit if not improved.   Level 4    (D32.9) Meningioma (H)  Comment:   Plan: Followed by oncology/dermatology     (M72.2) Plantar fasciitis  Comment:   Plan: Worsening on Right side. Had a history of surgery on her left foot. Discussed at home cares and heel wedges. Follow up with podiatry if no improvement. Will reach out to me for referral.  "  Level 4    (Z98.49) History of cataract extraction, unspecified laterality  Comment: Bilateral  Plan: Plans for upcoming eye exam and have results sent to me.     Patient has been advised of split billing requirements and indicates understanding: Yes      COUNSELING:  Reviewed preventive health counseling, as reflected in patient instructions  Special attention given to:       Regular exercise       Healthy diet/nutrition       Vision screening       Fall risk prevention       Colon cancer screening      BMI:   Estimated body mass index is 29.06 kg/m  as calculated from the following:    Height as of this encounter: 1.626 m (5' 4\").    Weight as of this encounter: 76.8 kg (169 lb 5 oz).     She reports that she has never smoked. She has never used smokeless tobacco.    Appropriate preventive services were discussed with this patient, including applicable screening as appropriate for cardiovascular disease, diabetes, osteopenia/osteoporosis, and glaucoma.  As appropriate for age/gender, discussed screening for colorectal cancer, prostate cancer, breast cancer, and cervical cancer. Checklist reviewing preventive services available has been given to the patient.    Reviewed patients plan of care and provided an AVS. The Basic Care Plan (routine screening as documented in Health Maintenance) for Elisabeth meets the Care Plan requirement. This Care Plan has been established and reviewed with the Patient.          CORAL Rudolph CNP  [facility]    Identified Health Risks:  I have reviewed Opioid Use Disorder and Substance Use Disorder risk factors and made any needed referrals.   "

## 2023-08-07 ENCOUNTER — TELEPHONE (OUTPATIENT)
Dept: ONCOLOGY | Facility: CLINIC | Age: 73
End: 2023-08-07
Payer: COMMERCIAL

## 2023-08-07 NOTE — TELEPHONE ENCOUNTER
I have attempted to contact this patient by phone with the following results: left message to return my call on answering machine.    Called pt to inform her that her appt is virtual with Dr. Bravo rather than in person, it was scheduled wrong. Her lab appt is 15 min before her virtual, that should be moved. Move to day before if possible. Make sure pt is aware of how to do a video visit.    Asmita PHIPPS [facility]  [phone]

## 2023-08-28 NOTE — PROGRESS NOTES
"[facility]    Hematology/Oncology New Patient Note      Today's Date: August 28, 2023     Referring provider: N/A  Reason for Consultation: Melanoma    HISTORY OF PRESENT ILLNESS: Elisabeth Voss is a 73 year old female who was referred to the Hematology/Oncology Clinic for melanoma.  Patient was previously following with Dr. Contreras, then Maria Del Rosario Maya whom she last saw on 8/22 and is now establishing care with me.    Patient has medical history including hyperlipidemia, hypothyroidism 2/2 left hemithyroidectomy for follicular adenoma with oncocytic features and hyperplastic nodule in setting of chronic lymphocytic thyroiditis 2014, GERD, osteoarthritis, cervical radiculopathy, varicose veins with right calf varicose vein superficial thrombophlebitis 8/2020, bilateral cataracts, thyroid nodules, right frontal meningioma 2016    Regarding melanoma:  -6/2014 skin of left chin elliptical excision:  - Invasive nodular malignant melanoma.   - Tumor size:  4 mm in greatest width.   - Maximum tumor thickness:  1.1 mm in thickness, Robe's level III   - Ulceration:  Absent   - Peripheral margins:  Uninvolved, in situ melanoma 0.46 mm from nearest   peripheral margin and invasive melanoma 0.66 mm from nearest peripheral   margin.   - Deep margin:  Uninvolved, invasive melanoma 2.5 mm from deep margin.   - Mitotic index:  Occasional mitosis.   - Microsatellitosis:  Absent.   - Lymph-vascular invasion:  Absent.   - Pathologic stage (pTNM):  pT1a  pN and pM not applicable.   - Additional histopathologic changes:  Focal intradermal nevus.     -8/2014 repeat excision of left mandible melanoma site with lymph node excision and left thyroid hemithyroidectomy  SPECIMEN(S):  A: Left mandibular melanoma site  B: Level 5B lymph node  C: Level 5B lymph node #2  D: Thyroid, left lobe  E: Right lateral forearm lesion    FINAL DIAGNOSIS:  A: Skin, \"left mandible melanoma site,\" excision  - Skin with solar " elastosis  - Negative for residual malignant melanoma  - No scar tissue identified  - No evidence of malignant melanoma at margins    B: Lymph node, level 5B, excision:  - One lymph node, negative for malignancy (0/1)  - Immunostains pending to exclude occult malignant cells    C: Lymph node, level 5B #2, excision:  - Two lymph nodes, negative for malignancy (0/2)  - Immunostains pending to exclude occult malignant cells    D: Thyroid, left lobe, hemithyroidectomy (including DFS1)  - Follicular adenoma with oncocytic features, 1.2 cm in greatest  dimension  - Hyperplastic nodule, 0.7 cm, in the setting of background nodular  hyperplasia  - No evidence of malignancy  - Chronic lymphocytic thyroiditis  - Single unremarkable parathyroid gland    E: Skin, right lateral forearm, biopsy  - Pigmented seborrheic keratosis  - Margins unremarkable  - No atypical or malignant features     -9/2014 repeat excision of left cheek excess skin, jawline and posterior neck  SPECIMEN(S):  A: Left cheek anterior and superior excess skin  B: Left cheek excess skin, superior and posterior  C: Left jaw line, anterior and inferior skin  D: Left neck posterior, inferior skin    FINAL DIAGNOSIS:  A: Skin, left anterior and superior cheek, excision  - Benign skin with focal granulation tissue, consistent with Mohs site  - No evidence of residual melanoma    B: Skin, left superior and posterior cheek, excision  - Benign skin with focal granulation tissue, consistent with Mohs site  - No evidence of residual melanoma    C: Skin, left anterior and inferior jawline, excision  - Benign skin with focal granulation tissue, consistent with Mohs site  - No evidence of residual melanoma    D: Skin, left posterior and inferior neck, excision  - Benign skin with focal granulation tissue, consistent with Mohs site  - No evidence of residual melanoma       7/23 labs: CBC WNL, CMP creatinine 0.98, GFR 60, hyperlipidemia    Pt is following with dermatology,  whom she was seeing q6 months and now q1 year. Last saw Dr. Schumer 6/23 at [facility]    Pt has been feeling well, lost about 50 lbs over total since 1/2023 on low carb diet and daily exercise w/5K-10K steps per day. Pt had previously lost about 40lbs in 2022 then regained the weight. Denies night sweats.     Pt is up to date on mammogram 6/23  She is overdue for colonoscopy, was last in 3/2013 and overdue as of 3/2023    Family hx of cancer:  Sister- esophageal CA, smoker, passed   Sister- breast CA  Mother- lung CA, smoker   Brother- thyroid CA     Pt has not had genetic testing.             REVIEW OF SYSTEMS:   A 14 point ROS was reviewed with pertinent positives and negatives in the HPI.        HOME MEDICATIONS:  Current Outpatient Medications   Medication Sig Dispense Refill    levothyroxine (SYNTHROID/LEVOTHROID) 50 MCG tablet Take 1 tablet by mouth once daily 90 tablet 0    pantoprazole (PROTONIX) 40 MG EC tablet Take 1 tablet by mouth once daily 90 tablet 0    simvastatin (ZOCOR) 20 MG tablet Take 1 tablet by mouth once daily 90 tablet 0         ALLERGIES:  Allergies   Allergen Reactions    No Known Drug Allergy          PAST MEDICAL HISTORY:  Past Medical History:   Diagnosis Date    Cancer (H)     melanoma of cheek    History of blood transfusion     with hysterectomy many years ago    Lyme disease     Motion sickness     Thyroid disease     partial thyroidectomy 8/2014         PAST SURGICAL HISTORY:  Past Surgical History:   Procedure Laterality Date    ABDOMEN SURGERY      APPENDECTOMY      ARTHROSCOPY KNEE WITH MEDIAL MENISCECTOMY Left 09/20/2016    Procedure: ARTHROSCOPY KNEE WITH MEDIAL MENISCECTOMY;  Surgeon: Maximilian Ochoa DO;  Location:  OR    BIOPSY NODE SENTINEL N/A 08/26/2014    Procedure: BIOPSY NODE SENTINEL;  Surgeon: Eliza Foss MD;  Location: [facility] OR    BLEPHAROPLASTY BILATERAL Bilateral 01/11/2018    Procedure: BLEPHAROPLASTY BILATERAL;  Bilateral upper eyelid  blepharoplasty;  Surgeon: Jame Painter MD;  Location: [facility] OR    CATARACT IOL, RT/LT Bilateral OD 12/22/16-OS 12/8/16    CHOLECYSTECTOMY  long time ago    COLONOSCOPY  03/22/2013    Procedure: COLONOSCOPY;  colonoscopy;  Surgeon: Mike Lr MD;  Location:  GI    COSMETIC SURGERY  august 2014    face-August 2014    HYSTERECTOMY, PAP NO LONGER INDICATED      ORTHOPEDIC SURGERY      shoulder surgery    PHACOEMULSIFICATION WITH STANDARD INTRAOCULAR LENS IMPLANT Left 12/08/2016    Procedure: PHACOEMULSIFICATION WITH STANDARD INTRAOCULAR LENS IMPLANT;  Surgeon: Jame Painter MD;  Location: [facility] OR    PHACOEMULSIFICATION WITH STANDARD INTRAOCULAR LENS IMPLANT Right 12/22/2016    Procedure: PHACOEMULSIFICATION WITH STANDARD INTRAOCULAR LENS IMPLANT;  Surgeon: Jame Painter MD;  Location: [facility] OR    RECONSTRUCT NOSE STAGE ONE N/A 09/05/2014    Procedure: RECONSTRUCT NOSE STAGE ONE;  Surgeon: Levy Holbrook MD;  Location: [facility] OR    REVISE SCAR FACE N/A 08/26/2014    Procedure: REVISE SCAR FACE;  Surgeon: Eliza Foss MD;  Location: [facility] OR    SOFT TISSUE SURGERY  2010    bisept tendon-left arm    SUTURE SUSPENSIONPLASTY THUMB Left 01/31/2022    Procedure: Left EXCISION, TRAPEZIUM, WITH SUTURE SUSPENSIONPLASTY;  Surgeon: Maximilian Ochoa DO;  Location:  OR    THYROIDECTOMY Left 08/26/2014    Procedure: THYROIDECTOMY;  Surgeon: Eliza Foss MD;  Location: [facility] OR    ZZC TOTAL ABDOM HYSTERECTOMY  11/16/1993    TAHRWili TAVAREZ urethropexy         SOCIAL HISTORY:  Social History     Socioeconomic History    Marital status:      Spouse name: Not on file    Number of children: Not on file    Years of education: Not on file    Highest education level: Not on file   Occupational History    Not on file   Tobacco Use    Smoking status: Never    Smokeless tobacco: Never   Vaping Use    Vaping Use: Never used   Substance and Sexual Activity    Alcohol use: Yes      "Comment: 1 beer a month    Drug use: No    Sexual activity: Yes     Partners: Male     Birth control/protection: Female Surgical     Comment: menopausal   Other Topics Concern    Parent/sibling w/ CABG, MI or angioplasty before 65F 55M? No   Social History Narrative    Not on file     Social Determinants of Health     Financial Resource Strain: Not on file   Food Insecurity: Not on file   Transportation Needs: Not on file   Physical Activity: Not on file   Stress: Not on file   Social Connections: Not on file   Intimate Partner Violence: Not on file   Housing Stability: Not on file         FAMILY HISTORY:  Family History   Problem Relation Age of Onset    Cancer Mother 45        lung cancer    Other Cancer Mother         lung    Cerebrovascular Disease Father         bulge in aorta    Coronary Artery Disease Father     Hypertension Father     Heart Disease Maternal Grandfather     Heart Disease Paternal Grandfather     Cancer Sister         esophageal     Other Cancer Sister         esophogial    Cancer Paternal Grandmother     Cancer Brother 65        Thyroid    Diabetes Brother     Other Cancer Brother         thyroid    Other Cancer Sister         lung         PHYSICAL EXAM:  Vital signs:  /72   Pulse 58   Temp 97.6  F (36.4  C) (Temporal)   Ht 1.626 m (5' 4\")   Wt 77.6 kg (171 lb 1.6 oz)   LMP  (LMP Unknown)   SpO2 98%   BMI 29.37 kg/m         ECO  GENERAL/CONSTITUTIONAL: No acute distress.  EYES: Pupils are equal and round. Extraocular movements intact without nystagmus.  No scleral icterus.  RESPIRATORY: Equal chest rise.   MUSCULOSKELETAL: Warm and well-perfused, no cyanosis, clubbing, or edema.   NEUROLOGIC: Cranial nerves are grossly intact. Alert, oriented to person, place and time, answers questions appropriately.  INTEGUMENTARY: No rashes or jaundice.  GAIT: Steady, does not use assistive device        LABS:   Latest Reference Range & Units 23 08:24 23 09:05 23 08:07 "   Sodium 136 - 145 mmol/L 142  142   Potassium 3.4 - 5.3 mmol/L 3.7  4.0   Chloride 98 - 107 mmol/L 105  106   Carbon Dioxide (CO2) 22 - 29 mmol/L 28  25   Urea Nitrogen 8.0 - 23.0 mg/dL 14.5  13.0   Creatinine 0.51 - 0.95 mg/dL 0.98 (H)  1.08 (H)   GFR Estimate >60 mL/min/1.73m2 60 (L)  54 (L)   Calcium 8.8 - 10.2 mg/dL 9.6  9.3   Anion Gap 7 - 15 mmol/L 9  11   Albumin 3.5 - 5.2 g/dL 4.5  4.3   Protein Total 6.4 - 8.3 g/dL 7.1  6.5   Alkaline Phosphatase 35 - 104 U/L 63  59   ALT 0 - 50 U/L 19  13   AST 0 - 45 U/L 23  23   Bilirubin Total <=1.2 mg/dL 0.5  0.5   Cholesterol <200 mg/dL 207 (H)     Glucose 70 - 99 mg/dL 92  91   HDL Cholesterol >=50 mg/dL 74     LDL Cholesterol Calculated <=100 mg/dL 114 (H)     Non HDL Cholesterol <130 mg/dL 133 (H)     Triglycerides <150 mg/dL 97     TSH 0.30 - 4.20 uIU/mL 1.35     WBC 4.0 - 11.0 10e3/uL 4.5  4.4   Hemoglobin 11.7 - 15.7 g/dL 14.3  13.3   Hematocrit 35.0 - 47.0 % 44.0  40.5   Platelet Count 150 - 450 10e3/uL 259  236   RBC Count 3.80 - 5.20 10e6/uL 4.86  4.56   MCV 78 - 100 fL 91  89   MCH 26.5 - 33.0 pg 29.4  29.2   MCHC 31.5 - 36.5 g/dL 32.5  32.8   RDW 10.0 - 15.0 % 14.4  14.3   % Neutrophils % 50     % Lymphocytes % 39     % Monocytes % 8     % Eosinophils % 2     % Basophils % 0     Absolute Basophils 0.0 - 0.2 10e3/uL 0.0     Absolute Eosinophils 0.0 - 0.7 10e3/uL 0.1     Absolute Immature Granulocytes <=0.4 10e3/uL 0.0     Absolute Lymphocytes 0.8 - 5.3 10e3/uL 1.8     Absolute Monocytes 0.0 - 1.3 10e3/uL 0.4     % Immature Granulocytes % 0     Absolute Neutrophils 1.6 - 8.3 10e3/uL 2.3     Color Urine Colorless, Straw, Light Yellow, Yellow   Yellow    Appearance Urine Clear   Clear    Glucose Urine Negative mg/dL  Negative    Bilirubin Urine Negative   Small !    Ketones Urine Negative mg/dL  Negative    Specific Gravity Urine 1.003 - 1.035   1.020    pH Urine 5.0 - 7.0   5.5    Protein Albumin Urine Negative mg/dL  Negative    Urobilinogen Urine 0.2, 1.0  E.U./dL  0.2    Nitrite Urine Negative   Negative    Blood Urine Negative   Negative    Leukocyte Esterase Urine Negative   Negative    (H): Data is abnormally high  (L): Data is abnormally low  !: Data is abnormal    PATHOLOGY:      IMAGING:      ASSESSMENT/PLAN:  Elisabeth Voss is a 73 year old female with:      # pT1a pN0 left chin skin malignant melanoma  -6/2014 elliptical excision of the site showing invasive nodular malignant melanoma, 4 mm wide x1.1 mm thick (Robe level III), negative ulceration, negative margins (but close, in situ melanoma 0.46 mm from nearest peripheral margin and invasive melanoma 0.66 mm from nearest peripheral margin), negative LVI, negative microsatellitosis, mitotic index: Occasional. pT1a Nx Mx  -8/2014 repeat excision of left mandible melanoma site with lymph node biopsy: Negative for malignant melanoma, left level 5B lymph node excision negative (0/3)  -9/2014 repeat excision of left cheek excess skin, jawline, posterior neck: No evidence of melanoma  -8/23 labs: CBC WNL, CMP creatinine 1.08, GFR 54, hyperlipidemia, TSH normal     PLAN:  -Of note, AJCC 8th edition staging would now classify this lesion as stage 1B pT2a (thickness > 1.0 mm - 2 mm, without ulceration), N0  -Patient has not had any evidence of recurrence for almost 9 years  -NCCN guidelines for follow-up are as follows:   H&P (with emphasis on nodes and skin) every 6-12 mo for 5 y, then annually as clinically indicated   Routine blood tests are not recommended   Routine imaging to screen for asymptomatic recurrence or metastatic disease is not recommended   Imaging as indicated to investigate specific signs or symptoms    -Discussed sun safety including sun avoidance during peak hours, use of sun protective clothing/hat/eyewear, regular application of broad-spectrum sunscreen  -At this time we will have patient follow-up with dermatology for annual skin check and transition the patient to long-term survivorship  clinic    # multiple 1st degree family members w/malignancy  - as detailed above  - genetic counseling referral    #health maintenance  - overdue for colonoscopy, discussed today     #intentional weight loss   - 7/23 TSH normal  - follow up PCP    RTC prn    Kiley Bravo DO  Hematology/Oncology  [facility]    Future Appointments   Date Time Provider Department Center   8/29/2023  8:15 AM PH [facility]   8/29/2023  8:45 AM Kiley Bravo DO [facility]

## 2023-08-29 ENCOUNTER — PATIENT OUTREACH (OUTPATIENT)
Dept: ONCOLOGY | Facility: CLINIC | Age: 73
End: 2023-08-29

## 2023-08-29 ENCOUNTER — ONCOLOGY VISIT (OUTPATIENT)
Dept: ONCOLOGY | Facility: CLINIC | Age: 73
End: 2023-08-29
Payer: COMMERCIAL

## 2023-08-29 VITALS
OXYGEN SATURATION: 98 % | DIASTOLIC BLOOD PRESSURE: 72 MMHG | HEIGHT: 64 IN | TEMPERATURE: 97.6 F | HEART RATE: 58 BPM | SYSTOLIC BLOOD PRESSURE: 114 MMHG | BODY MASS INDEX: 29.21 KG/M2 | WEIGHT: 171.1 LBS

## 2023-08-29 DIAGNOSIS — Z80.3 FAMILY HISTORY OF MALIGNANT NEOPLASM OF BREAST: ICD-10-CM

## 2023-08-29 DIAGNOSIS — C43.30 MALIGNANT MELANOMA OF SKIN OF FACE (H): Primary | ICD-10-CM

## 2023-08-29 LAB
ALBUMIN SERPL BCG-MCNC: 4.3 G/DL (ref 3.5–5.2)
ALP SERPL-CCNC: 59 U/L (ref 35–104)
ALT SERPL W P-5'-P-CCNC: 13 U/L (ref 0–50)
ANION GAP SERPL CALCULATED.3IONS-SCNC: 11 MMOL/L (ref 7–15)
AST SERPL W P-5'-P-CCNC: 23 U/L (ref 0–45)
BILIRUB SERPL-MCNC: 0.5 MG/DL
BUN SERPL-MCNC: 13 MG/DL (ref 8–23)
CALCIUM SERPL-MCNC: 9.3 MG/DL (ref 8.8–10.2)
CHLORIDE SERPL-SCNC: 106 MMOL/L (ref 98–107)
CREAT SERPL-MCNC: 1.08 MG/DL (ref 0.51–0.95)
DEPRECATED HCO3 PLAS-SCNC: 25 MMOL/L (ref 22–29)
ERYTHROCYTE [DISTWIDTH] IN BLOOD BY AUTOMATED COUNT: 14.3 % (ref 10–15)
GFR SERPL CREATININE-BSD FRML MDRD: 54 ML/MIN/1.73M2
GLUCOSE SERPL-MCNC: 91 MG/DL (ref 70–99)
HCT VFR BLD AUTO: 40.5 % (ref 35–47)
HGB BLD-MCNC: 13.3 G/DL (ref 11.7–15.7)
MCH RBC QN AUTO: 29.2 PG (ref 26.5–33)
MCHC RBC AUTO-ENTMCNC: 32.8 G/DL (ref 31.5–36.5)
MCV RBC AUTO: 89 FL (ref 78–100)
PLATELET # BLD AUTO: 236 10E3/UL (ref 150–450)
POTASSIUM SERPL-SCNC: 4 MMOL/L (ref 3.4–5.3)
PROT SERPL-MCNC: 6.5 G/DL (ref 6.4–8.3)
RBC # BLD AUTO: 4.56 10E6/UL (ref 3.8–5.2)
SODIUM SERPL-SCNC: 142 MMOL/L (ref 136–145)
WBC # BLD AUTO: 4.4 10E3/UL (ref 4–11)

## 2023-08-29 PROCEDURE — 80053 COMPREHEN METABOLIC PANEL: CPT | Performed by: INTERNAL MEDICINE

## 2023-08-29 PROCEDURE — 36415 COLL VENOUS BLD VENIPUNCTURE: CPT | Performed by: INTERNAL MEDICINE

## 2023-08-29 PROCEDURE — 85027 COMPLETE CBC AUTOMATED: CPT | Performed by: INTERNAL MEDICINE

## 2023-08-29 PROCEDURE — 99213 OFFICE O/P EST LOW 20 MIN: CPT | Performed by: INTERNAL MEDICINE

## 2023-08-29 ASSESSMENT — PAIN SCALES - GENERAL: PAINLEVEL: NO PAIN (0)

## 2023-08-29 NOTE — LETTER
8/29/2023         RE: Elisabeth Voss  [address] [facility] [address]      [facility]    Hematology/Oncology New Patient Note      Today's Date: August 28, 2023     Referring provider: N/A  Reason for Consultation: Melanoma    HISTORY OF PRESENT ILLNESS: Elisabeth Voss is a 73 year old female who was referred to the Hematology/Oncology Clinic for melanoma.  Patient was previously following with Dr. Contreras, then Maria Del Rosario Maya whom she last saw on 8/22 and is now establishing care with me.    Patient has medical history including hyperlipidemia, hypothyroidism 2/2 left hemithyroidectomy for follicular adenoma with oncocytic features and hyperplastic nodule in setting of chronic lymphocytic thyroiditis 2014, GERD, osteoarthritis, cervical radiculopathy, varicose veins with right calf varicose vein superficial thrombophlebitis 8/2020, bilateral cataracts, thyroid nodules, right frontal meningioma 2016    Regarding melanoma:  -6/2014 skin of left chin elliptical excision:  - Invasive nodular malignant melanoma.   - Tumor size:  4 mm in greatest width.   - Maximum tumor thickness:  1.1 mm in thickness, Robe's level III   - Ulceration:  Absent   - Peripheral margins:  Uninvolved, in situ melanoma 0.46 mm from nearest   peripheral margin and invasive melanoma 0.66 mm from nearest peripheral   margin.   - Deep margin:  Uninvolved, invasive melanoma 2.5 mm from deep margin.   - Mitotic index:  Occasional mitosis.   - Microsatellitosis:  Absent.   - Lymph-vascular invasion:  Absent.   - Pathologic stage (pTNM):  pT1a  pN and pM not applicable.   - Additional histopathologic changes:  Focal intradermal nevus.     -8/2014 repeat excision of left mandible melanoma site with lymph node excision and left thyroid hemithyroidectomy  SPECIMEN(S):  A: Left mandibular melanoma site  B: Level 5B lymph node  C: Level 5B lymph node #2  D: Thyroid, left lobe  E: Right lateral forearm  "lesion    FINAL DIAGNOSIS:  A: Skin, \"left mandible melanoma site,\" excision  - Skin with solar elastosis  - Negative for residual malignant melanoma  - No scar tissue identified  - No evidence of malignant melanoma at margins    B: Lymph node, level 5B, excision:  - One lymph node, negative for malignancy (0/1)  - Immunostains pending to exclude occult malignant cells    C: Lymph node, level 5B #2, excision:  - Two lymph nodes, negative for malignancy (0/2)  - Immunostains pending to exclude occult malignant cells    D: Thyroid, left lobe, hemithyroidectomy (including DFS1)  - Follicular adenoma with oncocytic features, 1.2 cm in greatest  dimension  - Hyperplastic nodule, 0.7 cm, in the setting of background nodular  hyperplasia  - No evidence of malignancy  - Chronic lymphocytic thyroiditis  - Single unremarkable parathyroid gland    E: Skin, right lateral forearm, biopsy  - Pigmented seborrheic keratosis  - Margins unremarkable  - No atypical or malignant features     -9/2014 repeat excision of left cheek excess skin, jawline and posterior neck  SPECIMEN(S):  A: Left cheek anterior and superior excess skin  B: Left cheek excess skin, superior and posterior  C: Left jaw line, anterior and inferior skin  D: Left neck posterior, inferior skin    FINAL DIAGNOSIS:  A: Skin, left anterior and superior cheek, excision  - Benign skin with focal granulation tissue, consistent with Mohs site  - No evidence of residual melanoma    B: Skin, left superior and posterior cheek, excision  - Benign skin with focal granulation tissue, consistent with Mohs site  - No evidence of residual melanoma    C: Skin, left anterior and inferior jawline, excision  - Benign skin with focal granulation tissue, consistent with Mohs site  - No evidence of residual melanoma    D: Skin, left posterior and inferior neck, excision  - Benign skin with focal granulation tissue, consistent with Mohs site  - No evidence of residual melanoma       7/23 " labs: CBC WNL, CMP creatinine 0.98, GFR 60, hyperlipidemia    Pt is following with dermatology, whom she was seeing q6 months and now q1 year. Last saw Dr. Schumer 6/23 at [facility]    Pt has been feeling well, lost about 50 lbs over total since 1/2023 on low carb diet and daily exercise w/5K-10K steps per day. Pt had previously lost about 40lbs in 2022 then regained the weight. Denies night sweats.     Pt is up to date on mammogram 6/23  She is overdue for colonoscopy, was last in 3/2013 and overdue as of 3/2023    Family hx of cancer:  Sister- esophageal CA, smoker, passed   Sister- breast CA  Mother- lung CA, smoker   Brother- thyroid CA     Pt has not had genetic testing.             REVIEW OF SYSTEMS:   A 14 point ROS was reviewed with pertinent positives and negatives in the HPI.        HOME MEDICATIONS:  Current Outpatient Medications   Medication Sig Dispense Refill     levothyroxine (SYNTHROID/LEVOTHROID) 50 MCG tablet Take 1 tablet by mouth once daily 90 tablet 0     pantoprazole (PROTONIX) 40 MG EC tablet Take 1 tablet by mouth once daily 90 tablet 0     simvastatin (ZOCOR) 20 MG tablet Take 1 tablet by mouth once daily 90 tablet 0         ALLERGIES:  Allergies   Allergen Reactions     No Known Drug Allergy          PAST MEDICAL HISTORY:  Past Medical History:   Diagnosis Date     Cancer (H)     melanoma of cheek     History of blood transfusion     with hysterectomy many years ago     Lyme disease      Motion sickness      Thyroid disease     partial thyroidectomy 8/2014         PAST SURGICAL HISTORY:  Past Surgical History:   Procedure Laterality Date     ABDOMEN SURGERY       APPENDECTOMY       ARTHROSCOPY KNEE WITH MEDIAL MENISCECTOMY Left 09/20/2016    Procedure: ARTHROSCOPY KNEE WITH MEDIAL MENISCECTOMY;  Surgeon: Maximilian Ochoa DO;  Location:  OR     BIOPSY NODE SENTINEL N/A 08/26/2014    Procedure: BIOPSY NODE SENTINEL;  Surgeon: Eliza Foss MD;  Location: [facility]      BLEPHAROPLASTY BILATERAL Bilateral 01/11/2018    Procedure: BLEPHAROPLASTY BILATERAL;  Bilateral upper eyelid blepharoplasty;  Surgeon: Jame Painter MD;  Location: [facility] OR     CATARACT IOL, RT/LT Bilateral OD 12/22/16-OS 12/8/16     CHOLECYSTECTOMY  long time ago     COLONOSCOPY  03/22/2013    Procedure: COLONOSCOPY;  colonoscopy;  Surgeon: Mike Lr MD;  Location:  GI     COSMETIC SURGERY  august 2014    face-August 2014     HYSTERECTOMY, PAP NO LONGER INDICATED       ORTHOPEDIC SURGERY      shoulder surgery     PHACOEMULSIFICATION WITH STANDARD INTRAOCULAR LENS IMPLANT Left 12/08/2016    Procedure: PHACOEMULSIFICATION WITH STANDARD INTRAOCULAR LENS IMPLANT;  Surgeon: Jame Painter MD;  Location: [facility] OR     PHACOEMULSIFICATION WITH STANDARD INTRAOCULAR LENS IMPLANT Right 12/22/2016    Procedure: PHACOEMULSIFICATION WITH STANDARD INTRAOCULAR LENS IMPLANT;  Surgeon: Jame Painter MD;  Location: [facility] OR     RECONSTRUCT NOSE STAGE ONE N/A 09/05/2014    Procedure: RECONSTRUCT NOSE STAGE ONE;  Surgeon: Levy Holbrook MD;  [facility]     REVISE SCAR FACE N/A 08/26/2014    Procedure: REVISE SCAR FACE;  Surgeon: Eliza Foss MD;  [facility]     SOFT TISSUE SURGERY  2010    bisept tendon-left arm     SUTURE SUSPENSIONPLASTY THUMB Left 01/31/2022    Procedure: Left EXCISION, TRAPEZIUM, WITH SUTURE SUSPENSIONPLASTY;  Surgeon: Maximilian Ochoa DO;  Location: [facility] OR     THYROIDECTOMY Left 08/26/2014    Procedure: THYROIDECTOMY;  Surgeon: Eliza Foss MD;  Location: [facility] OR     [facility] TOTAL ABDOM HYSTERECTOMY  11/16/1993    Wili NEVILLE urethropexy         SOCIAL HISTORY:  Social History     Socioeconomic History     Marital status:      Spouse name: Not on file     Number of children: Not on file     Years of education: Not on file     Highest education level: Not on file   Occupational History     Not on file   Tobacco Use     Smoking status:  "Never     Smokeless tobacco: Never   Vaping Use     Vaping Use: Never used   Substance and Sexual Activity     Alcohol use: Yes     Comment: 1 beer a month     Drug use: No     Sexual activity: Yes     Partners: Male     Birth control/protection: Female Surgical     Comment: menopausal   Other Topics Concern     Parent/sibling w/ CABG, MI or angioplasty before 65F 55M? No   Social History Narrative     Not on file     Social Determinants of Health     Financial Resource Strain: Not on file   Food Insecurity: Not on file   Transportation Needs: Not on file   Physical Activity: Not on file   Stress: Not on file   Social Connections: Not on file   Intimate Partner Violence: Not on file   Housing Stability: Not on file         FAMILY HISTORY:  Family History   Problem Relation Age of Onset     Cancer Mother 45        lung cancer     Other Cancer Mother         lung     Cerebrovascular Disease Father         bulge in aorta     Coronary Artery Disease Father      Hypertension Father      Heart Disease Maternal Grandfather      Heart Disease Paternal Grandfather      Cancer Sister         esophageal      Other Cancer Sister         esophogial     Cancer Paternal Grandmother      Cancer Brother 65        Thyroid     Diabetes Brother      Other Cancer Brother         thyroid     Other Cancer Sister         lung         PHYSICAL EXAM:  Vital signs:  /72   Pulse 58   Temp 97.6  F (36.4  C) (Temporal)   Ht 1.626 m (5' 4\")   Wt 77.6 kg (171 lb 1.6 oz)   LMP  (LMP Unknown)   SpO2 98%   BMI 29.37 kg/m         ECO  GENERAL/CONSTITUTIONAL: No acute distress.  EYES: Pupils are equal and round. Extraocular movements intact without nystagmus.  No scleral icterus.  RESPIRATORY: Equal chest rise.   MUSCULOSKELETAL: Warm and well-perfused, no cyanosis, clubbing, or edema.   NEUROLOGIC: Cranial nerves are grossly intact. Alert, oriented to person, place and time, answers questions appropriately.  INTEGUMENTARY: No rashes " or jaundice.  GAIT: Steady, does not use assistive device        LABS:   Latest Reference Range & Units 07/26/23 08:24 07/26/23 09:05 08/29/23 08:07   Sodium 136 - 145 mmol/L 142  142   Potassium 3.4 - 5.3 mmol/L 3.7  4.0   Chloride 98 - 107 mmol/L 105  106   Carbon Dioxide (CO2) 22 - 29 mmol/L 28  25   Urea Nitrogen 8.0 - 23.0 mg/dL 14.5  13.0   Creatinine 0.51 - 0.95 mg/dL 0.98 (H)  1.08 (H)   GFR Estimate >60 mL/min/1.73m2 60 (L)  54 (L)   Calcium 8.8 - 10.2 mg/dL 9.6  9.3   Anion Gap 7 - 15 mmol/L 9  11   Albumin 3.5 - 5.2 g/dL 4.5  4.3   Protein Total 6.4 - 8.3 g/dL 7.1  6.5   Alkaline Phosphatase 35 - 104 U/L 63  59   ALT 0 - 50 U/L 19  13   AST 0 - 45 U/L 23  23   Bilirubin Total <=1.2 mg/dL 0.5  0.5   Cholesterol <200 mg/dL 207 (H)     Glucose 70 - 99 mg/dL 92  91   HDL Cholesterol >=50 mg/dL 74     LDL Cholesterol Calculated <=100 mg/dL 114 (H)     Non HDL Cholesterol <130 mg/dL 133 (H)     Triglycerides <150 mg/dL 97     TSH 0.30 - 4.20 uIU/mL 1.35     WBC 4.0 - 11.0 10e3/uL 4.5  4.4   Hemoglobin 11.7 - 15.7 g/dL 14.3  13.3   Hematocrit 35.0 - 47.0 % 44.0  40.5   Platelet Count 150 - 450 10e3/uL 259  236   RBC Count 3.80 - 5.20 10e6/uL 4.86  4.56   MCV 78 - 100 fL 91  89   MCH 26.5 - 33.0 pg 29.4  29.2   MCHC 31.5 - 36.5 g/dL 32.5  32.8   RDW 10.0 - 15.0 % 14.4  14.3   % Neutrophils % 50     % Lymphocytes % 39     % Monocytes % 8     % Eosinophils % 2     % Basophils % 0     Absolute Basophils 0.0 - 0.2 10e3/uL 0.0     Absolute Eosinophils 0.0 - 0.7 10e3/uL 0.1     Absolute Immature Granulocytes <=0.4 10e3/uL 0.0     Absolute Lymphocytes 0.8 - 5.3 10e3/uL 1.8     Absolute Monocytes 0.0 - 1.3 10e3/uL 0.4     % Immature Granulocytes % 0     Absolute Neutrophils 1.6 - 8.3 10e3/uL 2.3     Color Urine Colorless, Straw, Light Yellow, Yellow   Yellow    Appearance Urine Clear   Clear    Glucose Urine Negative mg/dL  Negative    Bilirubin Urine Negative   Small !    Ketones Urine Negative mg/dL  Negative     Specific Gravity Urine 1.003 - 1.035   1.020    pH Urine 5.0 - 7.0   5.5    Protein Albumin Urine Negative mg/dL  Negative    Urobilinogen Urine 0.2, 1.0 E.U./dL  0.2    Nitrite Urine Negative   Negative    Blood Urine Negative   Negative    Leukocyte Esterase Urine Negative   Negative    (H): Data is abnormally high  (L): Data is abnormally low  !: Data is abnormal    PATHOLOGY:      IMAGING:      ASSESSMENT/PLAN:  Elisabeth Voss is a 73 year old female with:      # pT1a pN0 left chin skin malignant melanoma  -6/2014 elliptical excision of the site showing invasive nodular malignant melanoma, 4 mm wide x1.1 mm thick (Robe level III), negative ulceration, negative margins (but close, in situ melanoma 0.46 mm from nearest peripheral margin and invasive melanoma 0.66 mm from nearest peripheral margin), negative LVI, negative microsatellitosis, mitotic index: Occasional. pT1a Nx Mx  -8/2014 repeat excision of left mandible melanoma site with lymph node biopsy: Negative for malignant melanoma, left level 5B lymph node excision negative (0/3)  -9/2014 repeat excision of left cheek excess skin, jawline, posterior neck: No evidence of melanoma  -8/23 labs: CBC WNL, CMP creatinine 1.08, GFR 54, hyperlipidemia, TSH normal     PLAN:  -Of note, AJCC 8th edition staging would now classify this lesion as stage 1B pT2a (thickness > 1.0 mm - 2 mm, without ulceration), N0  -Patient has not had any evidence of recurrence for almost 9 years  -NCCN guidelines for follow-up are as follows:    H&P (with emphasis on nodes and skin) every 6-12 mo for 5 y, then annually as clinically indicated    Routine blood tests are not recommended    Routine imaging to screen for asymptomatic recurrence or metastatic disease is not recommended    Imaging as indicated to investigate specific signs or symptoms    -Discussed sun safety including sun avoidance during peak hours, use of sun protective clothing/hat/eyewear, regular application of  broad-spectrum sunscreen  -At this time we will have patient follow-up with dermatology for annual skin check and transition the patient to long-term survivorship clinic    # multiple 1st degree family members w/malignancy  - as detailed above  - genetic counseling referral    #health maintenance  - overdue for colonoscopy, discussed today     #intentional weight loss   - 7/23 TSH normal  - follow up PCP    RTC prn    Anibal Becker DO  Hematology/Oncology  [facility]    Future Appointments   Date Time Provider Department Center   8/29/2023  8:15 AM [facility]   8/29/2023  8:45 AM Anibal Becker DO [facility]            ANIBAL BECKER DO

## 2023-08-29 NOTE — LETTER
8/29/2023         RE: Elisabeth Voss  [address]  [facility] [address]        Dear Colleague,    Thank you for referring your patient, Elisabeth Voss, to the [facility]. Please see a copy of my visit note below.    [facility]    Hematology/Oncology New Patient Note      Today's Date: August 28, 2023     Referring provider: N/A  Reason for Consultation: Melanoma    HISTORY OF PRESENT ILLNESS: Elisabeth Voss is a 73 year old female who was referred to the Hematology/Oncology Clinic for melanoma.  Patient was previously following with Dr. Contreras, then Maria Del Rosario Maya whom she last saw on 8/22 and is now establishing care with me.    Patient has medical history including hyperlipidemia, hypothyroidism 2/2 left hemithyroidectomy for follicular adenoma with oncocytic features and hyperplastic nodule in setting of chronic lymphocytic thyroiditis 2014, GERD, osteoarthritis, cervical radiculopathy, varicose veins with right calf varicose vein superficial thrombophlebitis 8/2020, bilateral cataracts, thyroid nodules, right frontal meningioma 2016    Regarding melanoma:  -6/2014 skin of left chin elliptical excision:  - Invasive nodular malignant melanoma.   - Tumor size:  4 mm in greatest width.   - Maximum tumor thickness:  1.1 mm in thickness, Robe's level III   - Ulceration:  Absent   - Peripheral margins:  Uninvolved, in situ melanoma 0.46 mm from nearest   peripheral margin and invasive melanoma 0.66 mm from nearest peripheral   margin.   - Deep margin:  Uninvolved, invasive melanoma 2.5 mm from deep margin.   - Mitotic index:  Occasional mitosis.   - Microsatellitosis:  Absent.   - Lymph-vascular invasion:  Absent.   - Pathologic stage (pTNM):  pT1a  pN and pM not applicable.   - Additional histopathologic changes:  Focal intradermal nevus.     -8/2014 repeat excision of left mandible melanoma site with lymph node excision and left  "thyroid hemithyroidectomy  SPECIMEN(S):  A: Left mandibular melanoma site  B: Level 5B lymph node  C: Level 5B lymph node #2  D: Thyroid, left lobe  E: Right lateral forearm lesion    FINAL DIAGNOSIS:  A: Skin, \"left mandible melanoma site,\" excision  - Skin with solar elastosis  - Negative for residual malignant melanoma  - No scar tissue identified  - No evidence of malignant melanoma at margins    B: Lymph node, level 5B, excision:  - One lymph node, negative for malignancy (0/1)  - Immunostains pending to exclude occult malignant cells    C: Lymph node, level 5B #2, excision:  - Two lymph nodes, negative for malignancy (0/2)  - Immunostains pending to exclude occult malignant cells    D: Thyroid, left lobe, hemithyroidectomy (including DFS1)  - Follicular adenoma with oncocytic features, 1.2 cm in greatest  dimension  - Hyperplastic nodule, 0.7 cm, in the setting of background nodular  hyperplasia  - No evidence of malignancy  - Chronic lymphocytic thyroiditis  - Single unremarkable parathyroid gland    E: Skin, right lateral forearm, biopsy  - Pigmented seborrheic keratosis  - Margins unremarkable  - No atypical or malignant features     -9/2014 repeat excision of left cheek excess skin, jawline and posterior neck  SPECIMEN(S):  A: Left cheek anterior and superior excess skin  B: Left cheek excess skin, superior and posterior  C: Left jaw line, anterior and inferior skin  D: Left neck posterior, inferior skin    FINAL DIAGNOSIS:  A: Skin, left anterior and superior cheek, excision  - Benign skin with focal granulation tissue, consistent with Mohs site  - No evidence of residual melanoma    B: Skin, left superior and posterior cheek, excision  - Benign skin with focal granulation tissue, consistent with Mohs site  - No evidence of residual melanoma    C: Skin, left anterior and inferior jawline, excision  - Benign skin with focal granulation tissue, consistent with Mohs site  - No evidence of residual " melanoma    D: Skin, left posterior and inferior neck, excision  - Benign skin with focal granulation tissue, consistent with Mohs site  - No evidence of residual melanoma       7/23 labs: CBC WNL, CMP creatinine 0.98, GFR 60, hyperlipidemia    Pt is following with dermatology, whom she was seeing q6 months and now q1 year. Last saw Dr. Schumer 6/23 at [facility]    Pt has been feeling well, lost about 50 lbs over total since 1/2023 on low carb diet and daily exercise w/5K-10K steps per day. Pt had previously lost about 40lbs in 2022 then regained the weight. Denies night sweats.     Pt is up to date on mammogram 6/23  She is overdue for colonoscopy, was last in 3/2013 and overdue as of 3/2023    Family hx of cancer:  Sister- esophageal CA, smoker, passed   Sister- breast CA  Mother- lung CA, smoker   Brother- thyroid CA     Pt has not had genetic testing.             REVIEW OF SYSTEMS:   A 14 point ROS was reviewed with pertinent positives and negatives in the HPI.        HOME MEDICATIONS:  Current Outpatient Medications   Medication Sig Dispense Refill     levothyroxine (SYNTHROID/LEVOTHROID) 50 MCG tablet Take 1 tablet by mouth once daily 90 tablet 0     pantoprazole (PROTONIX) 40 MG EC tablet Take 1 tablet by mouth once daily 90 tablet 0     simvastatin (ZOCOR) 20 MG tablet Take 1 tablet by mouth once daily 90 tablet 0         ALLERGIES:  Allergies   Allergen Reactions     No Known Drug Allergy          PAST MEDICAL HISTORY:  Past Medical History:   Diagnosis Date     Cancer (H)     melanoma of cheek     History of blood transfusion     with hysterectomy many years ago     Lyme disease      Motion sickness      Thyroid disease     partial thyroidectomy 8/2014         PAST SURGICAL HISTORY:  Past Surgical History:   Procedure Laterality Date     ABDOMEN SURGERY       APPENDECTOMY       ARTHROSCOPY KNEE WITH MEDIAL MENISCECTOMY Left 09/20/2016    Procedure: ARTHROSCOPY KNEE WITH MEDIAL MENISCECTOMY;  Surgeon:  Maximilian Ochoa DO;  Location: [facility]     BIOPSY NODE SENTINEL N/A 08/26/2014    Procedure: BIOPSY NODE SENTINEL;  Surgeon: Eliza Foss MD;  [facility]: [facility]     BLEPHAROPLASTY BILATERAL Bilateral 01/11/2018    Procedure: BLEPHAROPLASTY BILATERAL;  Bilateral upper eyelid blepharoplasty;  Surgeon: Jame Painter MD;  Location: [facility] OR     CATARACT IOL, RT/LT Bilateral OD 12/22/16-OS 12/8/16     CHOLECYSTECTOMY  long time ago     COLONOSCOPY  03/22/2013    Procedure: COLONOSCOPY;  colonoscopy;  Surgeon: Mike Lr MD;  Location: [facility] GI     COSMETIC SURGERY  august 2014    face-August 2014     HYSTERECTOMY, PAP NO LONGER INDICATED       ORTHOPEDIC SURGERY      shoulder surgery     PHACOEMULSIFICATION WITH STANDARD INTRAOCULAR LENS IMPLANT Left 12/08/2016    Procedure: PHACOEMULSIFICATION WITH STANDARD INTRAOCULAR LENS IMPLANT;  Surgeon: Jame Painter MD;  Location: [facility]     PHACOEMULSIFICATION WITH STANDARD INTRAOCULAR LENS IMPLANT Right 12/22/2016    Procedure: PHACOEMULSIFICATION WITH STANDARD INTRAOCULAR LENS IMPLANT;  Surgeon: Jame Painter MD;  Location: [facility]     RECONSTRUCT NOSE STAGE ONE N/A 09/05/2014    Procedure: RECONSTRUCT NOSE STAGE ONE;  Surgeon: Levy Holbrook MD;  [facility]     REVISE SCAR FACE N/A 08/26/2014    Procedure: REVISE SCAR FACE;  Surgeon: Eliza Foss MD;  [facility]     SOFT TISSUE SURGERY  2010    bisept tendon-left arm     SUTURE SUSPENSIONPLASTY THUMB Left 01/31/2022    Procedure: Left EXCISION, TRAPEZIUM, WITH SUTURE SUSPENSIONPLASTY;  Surgeon: Maximilian Ochoa DO;  Location: [facility]     THYROIDECTOMY Left 08/26/2014    Procedure: THYROIDECTOMY;  Surgeon: Eliza Foss MD;  [facility]     ZZC TOTAL ABDOM HYSTERECTOMY  11/16/1993    TAWili ZAZUETA urethropexy         SOCIAL HISTORY:  Social History     Socioeconomic History     Marital status:      Spouse name: Not on file      "Number of children: Not on file     Years of education: Not on file     Highest education level: Not on file   Occupational History     Not on file   Tobacco Use     Smoking status: Never     Smokeless tobacco: Never   Vaping Use     Vaping Use: Never used   Substance and Sexual Activity     Alcohol use: Yes     Comment: 1 beer a month     Drug use: No     Sexual activity: Yes     Partners: Male     Birth control/protection: Female Surgical     Comment: menopausal   Other Topics Concern     Parent/sibling w/ CABG, MI or angioplasty before 65F 55M? No   Social History Narrative     Not on file     Social Determinants of Health     Financial Resource Strain: Not on file   Food Insecurity: Not on file   Transportation Needs: Not on file   Physical Activity: Not on file   Stress: Not on file   Social Connections: Not on file   Intimate Partner Violence: Not on file   Housing Stability: Not on file         FAMILY HISTORY:  Family History   Problem Relation Age of Onset     Cancer Mother 45        lung cancer     Other Cancer Mother         lung     Cerebrovascular Disease Father         bulge in aorta     Coronary Artery Disease Father      Hypertension Father      Heart Disease Maternal Grandfather      Heart Disease Paternal Grandfather      Cancer Sister         esophageal      Other Cancer Sister         esophogial     Cancer Paternal Grandmother      Cancer Brother 65        Thyroid     Diabetes Brother      Other Cancer Brother         thyroid     Other Cancer Sister         lung         PHYSICAL EXAM:  Vital signs:  /72   Pulse 58   Temp 97.6  F (36.4  C) (Temporal)   Ht 1.626 m (5' 4\")   Wt 77.6 kg (171 lb 1.6 oz)   LMP  (LMP Unknown)   SpO2 98%   BMI 29.37 kg/m         ECO  GENERAL/CONSTITUTIONAL: No acute distress.  EYES: Pupils are equal and round. Extraocular movements intact without nystagmus.  No scleral icterus.  RESPIRATORY: Equal chest rise.   MUSCULOSKELETAL: Warm and well-perfused, no " cyanosis, clubbing, or edema.   NEUROLOGIC: Cranial nerves are grossly intact. Alert, oriented to person, place and time, answers questions appropriately.  INTEGUMENTARY: No rashes or jaundice.  GAIT: Steady, does not use assistive device        LABS:   Latest Reference Range & Units 07/26/23 08:24 07/26/23 09:05 08/29/23 08:07   Sodium 136 - 145 mmol/L 142  142   Potassium 3.4 - 5.3 mmol/L 3.7  4.0   Chloride 98 - 107 mmol/L 105  106   Carbon Dioxide (CO2) 22 - 29 mmol/L 28  25   Urea Nitrogen 8.0 - 23.0 mg/dL 14.5  13.0   Creatinine 0.51 - 0.95 mg/dL 0.98 (H)  1.08 (H)   GFR Estimate >60 mL/min/1.73m2 60 (L)  54 (L)   Calcium 8.8 - 10.2 mg/dL 9.6  9.3   Anion Gap 7 - 15 mmol/L 9  11   Albumin 3.5 - 5.2 g/dL 4.5  4.3   Protein Total 6.4 - 8.3 g/dL 7.1  6.5   Alkaline Phosphatase 35 - 104 U/L 63  59   ALT 0 - 50 U/L 19  13   AST 0 - 45 U/L 23  23   Bilirubin Total <=1.2 mg/dL 0.5  0.5   Cholesterol <200 mg/dL 207 (H)     Glucose 70 - 99 mg/dL 92  91   HDL Cholesterol >=50 mg/dL 74     LDL Cholesterol Calculated <=100 mg/dL 114 (H)     Non HDL Cholesterol <130 mg/dL 133 (H)     Triglycerides <150 mg/dL 97     TSH 0.30 - 4.20 uIU/mL 1.35     WBC 4.0 - 11.0 10e3/uL 4.5  4.4   Hemoglobin 11.7 - 15.7 g/dL 14.3  13.3   Hematocrit 35.0 - 47.0 % 44.0  40.5   Platelet Count 150 - 450 10e3/uL 259  236   RBC Count 3.80 - 5.20 10e6/uL 4.86  4.56   MCV 78 - 100 fL 91  89   MCH 26.5 - 33.0 pg 29.4  29.2   MCHC 31.5 - 36.5 g/dL 32.5  32.8   RDW 10.0 - 15.0 % 14.4  14.3   % Neutrophils % 50     % Lymphocytes % 39     % Monocytes % 8     % Eosinophils % 2     % Basophils % 0     Absolute Basophils 0.0 - 0.2 10e3/uL 0.0     Absolute Eosinophils 0.0 - 0.7 10e3/uL 0.1     Absolute Immature Granulocytes <=0.4 10e3/uL 0.0     Absolute Lymphocytes 0.8 - 5.3 10e3/uL 1.8     Absolute Monocytes 0.0 - 1.3 10e3/uL 0.4     % Immature Granulocytes % 0     Absolute Neutrophils 1.6 - 8.3 10e3/uL 2.3     Color Urine Colorless, Straw, Light Yellow,  Yellow   Yellow    Appearance Urine Clear   Clear    Glucose Urine Negative mg/dL  Negative    Bilirubin Urine Negative   Small !    Ketones Urine Negative mg/dL  Negative    Specific Gravity Urine 1.003 - 1.035   1.020    pH Urine 5.0 - 7.0   5.5    Protein Albumin Urine Negative mg/dL  Negative    Urobilinogen Urine 0.2, 1.0 E.U./dL  0.2    Nitrite Urine Negative   Negative    Blood Urine Negative   Negative    Leukocyte Esterase Urine Negative   Negative    (H): Data is abnormally high  (L): Data is abnormally low  !: Data is abnormal    PATHOLOGY:      IMAGING:      ASSESSMENT/PLAN:  Elisabeth Voss is a 73 year old female with:      # pT1a pN0 left chin skin malignant melanoma  -6/2014 elliptical excision of the site showing invasive nodular malignant melanoma, 4 mm wide x1.1 mm thick (Robe level III), negative ulceration, negative margins (but close, in situ melanoma 0.46 mm from nearest peripheral margin and invasive melanoma 0.66 mm from nearest peripheral margin), negative LVI, negative microsatellitosis, mitotic index: Occasional. pT1a Nx Mx  -8/2014 repeat excision of left mandible melanoma site with lymph node biopsy: Negative for malignant melanoma, left level 5B lymph node excision negative (0/3)  -9/2014 repeat excision of left cheek excess skin, jawline, posterior neck: No evidence of melanoma  -8/23 labs: CBC WNL, CMP creatinine 1.08, GFR 54, hyperlipidemia, TSH normal     PLAN:  -Of note, AJCC 8th edition staging would now classify this lesion as stage 1B pT2a (thickness > 1.0 mm - 2 mm, without ulceration), N0  -Patient has not had any evidence of recurrence for almost 9 years  -NCCN guidelines for follow-up are as follows:    H&P (with emphasis on nodes and skin) every 6-12 mo for 5 y, then annually as clinically indicated    Routine blood tests are not recommended    Routine imaging to screen for asymptomatic recurrence or metastatic disease is not recommended    Imaging as indicated to  investigate specific signs or symptoms    -Discussed sun safety including sun avoidance during peak hours, use of sun protective clothing/hat/eyewear, regular application of broad-spectrum sunscreen  -At this time we will have patient follow-up with dermatology for annual skin check and transition the patient to long-term survivorship clinic    # multiple 1st degree family members w/malignancy  - as detailed above  - genetic counseling referral    #health maintenance  - overdue for colonoscopy, discussed today     #intentional weight loss   - 7/23 TSH normal  - follow up PCP    RTC prn    Anibal Becker DO  Hematology/Oncology  [facility]    Future Appointments   Date Time Provider Department Center   8/29/2023  8:15 AM [facility]   8/29/2023  8:45 AM Anibal Becker DO [facility]          Again, thank you for allowing me to participate in the care of your patient.        Sincerely,        ANIBAL BECKER DO

## 2023-08-29 NOTE — PROGRESS NOTES
Writer received Cancer Risk Management Program referral, referred for:     multiple 1st degree relatives w/cancer      Reviewed for appropriate plan, and sent to New Patient Scheduling for completion.

## 2023-08-31 NOTE — NURSING NOTE
DISCHARGE PLAN:  Next appointments: See patient instruction section  Departure Mode:   Accompanied by:    minutes for nursing discharge (face to face time)     Elisabeth MOISE Voss is here today for onc follow up.  Patient was not seen by writing nurse at time of appointment.   No appointments need to be scheduled. Pt return prn. See patient instructions and Oncologist's Progress note for further details. Questions and concerns addressed to patient's satisfaction. Patient verbalized and demonstrated understanding of plan.  Contact information provided and patient is encouraged to call with any that arise in the interim of care.    Asmita PHIPPS [facility]  [phone]  8/31/2023, 1:10 PM

## 2023-10-05 DIAGNOSIS — E03.8 OTHER SPECIFIED HYPOTHYROIDISM: ICD-10-CM

## 2023-10-05 DIAGNOSIS — E78.5 HYPERLIPIDEMIA LDL GOAL <160: ICD-10-CM

## 2023-10-05 DIAGNOSIS — K21.00 GASTROESOPHAGEAL REFLUX DISEASE WITH ESOPHAGITIS, UNSPECIFIED WHETHER HEMORRHAGE: ICD-10-CM

## 2023-10-06 RX ORDER — SIMVASTATIN 20 MG
TABLET ORAL
Qty: 90 TABLET | Refills: 0 | Status: SHIPPED | OUTPATIENT
Start: 2023-10-06 | End: 2024-01-08

## 2023-10-06 RX ORDER — PANTOPRAZOLE SODIUM 40 MG/1
TABLET, DELAYED RELEASE ORAL
Qty: 90 TABLET | Refills: 0 | Status: SHIPPED | OUTPATIENT
Start: 2023-10-06 | End: 2024-01-08

## 2023-10-06 RX ORDER — LEVOTHYROXINE SODIUM 50 UG/1
TABLET ORAL
Qty: 90 TABLET | Refills: 0 | Status: SHIPPED | OUTPATIENT
Start: 2023-10-06 | End: 2024-01-08

## 2023-11-12 ENCOUNTER — TRANSFERRED RECORDS (OUTPATIENT)
Dept: MULTI SPECIALTY CLINIC | Facility: CLINIC | Age: 73
End: 2023-11-12

## 2023-11-12 LAB — RETINOPATHY: NORMAL

## 2023-12-06 ENCOUNTER — OFFICE VISIT (OUTPATIENT)
Dept: PODIATRY | Facility: OTHER | Age: 73
End: 2023-12-06
Payer: COMMERCIAL

## 2023-12-06 ENCOUNTER — ANCILLARY PROCEDURE (OUTPATIENT)
Dept: GENERAL RADIOLOGY | Facility: OTHER | Age: 73
End: 2023-12-06
Attending: PODIATRIST
Payer: COMMERCIAL

## 2023-12-06 VITALS
TEMPERATURE: 98.7 F | DIASTOLIC BLOOD PRESSURE: 86 MMHG | HEART RATE: 58 BPM | SYSTOLIC BLOOD PRESSURE: 132 MMHG | WEIGHT: 172 LBS | HEIGHT: 64 IN | BODY MASS INDEX: 29.37 KG/M2

## 2023-12-06 DIAGNOSIS — M72.2 PLANTAR FASCIITIS, RIGHT: Primary | ICD-10-CM

## 2023-12-06 DIAGNOSIS — M72.2 PLANTAR FASCIITIS, RIGHT: ICD-10-CM

## 2023-12-06 PROCEDURE — 99203 OFFICE O/P NEW LOW 30 MIN: CPT | Performed by: PODIATRIST

## 2023-12-06 PROCEDURE — 73630 X-RAY EXAM OF FOOT: CPT | Mod: TC | Performed by: RADIOLOGY

## 2023-12-06 ASSESSMENT — PAIN SCALES - GENERAL: PAINLEVEL: WORST PAIN (10)

## 2023-12-06 NOTE — LETTER
12/6/2023         RE: Elisabeth Voss  [address] [facility] [address]        Dear Colleague,    Thank you for referring your patient, Elisabeth Voss, to the [facility]. Please see a copy of my visit note below.    HPI:  Elisabeth Voss is a 73 year old female who is seen in consultation at the request of self.    Pt presents for eval of:   (Onset, Location, L/R, Character, Treatments, Injury if yes)    XR Right foot 12/6/2023    Left plantar fascia surgery 25 years ago     Onset April 2023, plantar Right heel pain.  No injury noted. Presents with [employer] athletic shoes. Accompanied by her .  Sharp, stabbing pain 10/10 with every step. Unable to put full weight on heel. Intermittent throbbing, burning pain.    Temporary relief:  Rest, elevation, rolling on bottle of ice, OTC Birkenstock insert, silicone heel cup, Aleve 2 times daily, CBD gummies.  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      ROS: 10 point ROS neg other than the symptoms noted above in the HPI.    Patient Active Problem List   Diagnosis     Advanced directives, counseling/discussion     Hyperlipidemia LDL goal <160     Malignant melanoma of skin of face (H)     Hypothyroidism     Meningioma (H)     Chondromalacia of patellofemoral joint, left     Tear of medial meniscus of knee, left, initial encounter     Cataracts, bilateral     Glaucoma suspect     Epiretinal membrane (ERM) of right eye     Myopia, bilateral     Astigmatism, bilateral     Pseudophakia of both eyes     History of melanoma     Gastroesophageal reflux disease with esophagitis     Varicose veins of leg with pain, right     Calcific tendinitis of right shoulder     Impingement syndrome of shoulder region, right     Acute bursitis of right shoulder     Cervical radiculopathy     Primary  osteoarthritis of first carpometacarpal joint of left hand       PAST MEDICAL HISTORY:   Past Medical History:   Diagnosis Date     Cancer (H)     melanoma of cheek     History of blood transfusion     with hysterectomy many years ago     Lyme disease      Motion sickness      Thyroid disease     partial thyroidectomy 8/2014     PAST SURGICAL HISTORY:   Past Surgical History:   Procedure Laterality Date     ABDOMEN SURGERY       APPENDECTOMY       ARTHROSCOPY KNEE WITH MEDIAL MENISCECTOMY Left 09/20/2016    Procedure: ARTHROSCOPY KNEE WITH MEDIAL MENISCECTOMY;  Surgeon: Maximilian Ochoa DO;  [facility]     BIOPSY NODE SENTINEL N/A 08/26/2014    Procedure: BIOPSY NODE SENTINEL;  Surgeon: Eliza Foss MD;  [facility]     BLEPHAROPLASTY BILATERAL Bilateral 01/11/2018    Procedure: BLEPHAROPLASTY BILATERAL;  Bilateral upper eyelid blepharoplasty;  Surgeon: Jame Painter MD;  [facility]     CATARACT IOL, RT/LT Bilateral OD 12/22/16-OS 12/8/16     CHOLECYSTECTOMY  long time ago     COLONOSCOPY  03/22/2013    Procedure: COLONOSCOPY;  colonoscopy;  Surgeon: Mike Lr MD;  [facility]: [facility]     COSMETIC SURGERY  august 2014    face-August 2014     HYSTERECTOMY, PAP NO LONGER INDICATED       ORTHOPEDIC SURGERY      shoulder surgery     PHACOEMULSIFICATION WITH STANDARD INTRAOCULAR LENS IMPLANT Left 12/08/2016    Procedure: PHACOEMULSIFICATION WITH STANDARD INTRAOCULAR LENS IMPLANT;  Surgeon: Jame Painter MD;  [facility]     PHACOEMULSIFICATION WITH STANDARD INTRAOCULAR LENS IMPLANT Right 12/22/2016    Procedure: PHACOEMULSIFICATION WITH STANDARD INTRAOCULAR LENS IMPLANT;  Surgeon: Jame Painter MD;  [facility]     RECONSTRUCT NOSE STAGE ONE N/A 09/05/2014    Procedure: RECONSTRUCT NOSE STAGE ONE;  Surgeon: Levy Holbrook MD;  [facility]     REVISE SCAR FACE N/A 08/26/2014    Procedure: REVISE SCAR FACE;  Surgeon: Eliza Foss  MD Enedelia;  [facility]     SOFT TISSUE SURGERY  2010    bisept tendon-left arm     SUTURE SUSPENSIONPLASTY THUMB Left 01/31/2022    Procedure: Left EXCISION, TRAPEZIUM, WITH SUTURE SUSPENSIONPLASTY;  Surgeon: Maximilian Ochoa DO;  [facility]: [facility]     THYROIDECTOMY Left 08/26/2014    Procedure: THYROIDECTOMY;  Surgeon: Eliza Foss MD;  [facility]     [facility] TOTAL ABDOM HYSTERECTOMY  11/16/1993    TAHRSO, Rasheed urethropexy     MEDICATIONS:   Current Outpatient Medications:      levothyroxine (SYNTHROID/LEVOTHROID) 50 MCG tablet, Take 1 tablet by mouth once daily, Disp: 90 tablet, Rfl: 0     Naproxen Sodium (ALEVE PO), , Disp: , Rfl:      NEW MED, CBD gummies, Disp: , Rfl:      pantoprazole (PROTONIX) 40 MG EC tablet, Take 1 tablet by mouth once daily, Disp: 90 tablet, Rfl: 0     simvastatin (ZOCOR) 20 MG tablet, Take 1 tablet by mouth once daily, Disp: 90 tablet, Rfl: 0  ALLERGIES:    Allergies   Allergen Reactions     No Known Drug Allergy      SOCIAL HISTORY:   Social History     Socioeconomic History     Marital status:      Spouse name: Not on file     Number of children: Not on file     Years of education: Not on file     Highest education level: Not on file   Occupational History     Not on file   Tobacco Use     Smoking status: Never     Smokeless tobacco: Never   Vaping Use     Vaping Use: Never used   Substance and Sexual Activity     Alcohol use: Yes     Comment: 1 beer a month     Drug use: No     Sexual activity: Yes     Partners: Male     Birth control/protection: Female Surgical     Comment: menopausal   Other Topics Concern     Parent/sibling w/ CABG, MI or angioplasty before 65F 55M? No   Social History Narrative     Not on file     Social Determinants of Health     Financial Resource Strain: Not on file   Food Insecurity: Not on file   Transportation Needs: Not on file   Physical Activity: Not on file   Stress: Not on file   Social Connections: Not on file  "  Interpersonal Safety: Not on file   Housing Stability: Not on file     FAMILY HISTORY:   Family History   Problem Relation Age of Onset     Cancer Mother 45        lung cancer     Other Cancer Mother         lung     Cerebrovascular Disease Father         bulge in aorta     Coronary Artery Disease Father      Hypertension Father      Heart Disease Maternal Grandfather      Heart Disease Paternal Grandfather      Cancer Sister         esophageal      Other Cancer Sister         esophogial     Cancer Paternal Grandmother      Cancer Brother 65        Thyroid     Diabetes Brother      Other Cancer Brother         thyroid     Other Cancer Sister         lung       EXAM:Vitals: /86 (BP Location: Left arm, Patient Position: Sitting, Cuff Size: Adult Regular)   Pulse 58   Temp 98.7  F (37.1  C) (Temporal)   Ht 1.626 m (5' 4\")   Wt 78 kg (172 lb)   LMP  (LMP Unknown)   BMI 29.52 kg/m    BMI= Body mass index is 29.52 kg/m .    General appearance: Patient is alert and fully cooperative with history & exam.  No sign of distress is noted during the visit.     Psychiatric: Affect is pleasant & appropriate.  Patient appears motivated to improve health.     Respiratory: Breathing is regular & unlabored while sitting.     HEENT: Hearing is intact to spoken word.  Speech is clear.  No gross evidence of visual impairment that would impact ambulation.     Vascular: DP & PT 2/4 & regular bilaterally.  No significant edema, rubor or varicosities noted.  CFT and skin temperature is normal to both lower extremities.       Neurologic: Lower extremity sensation is intact to light touch.  No evidence of weakness in the lower extremities.  No evidence of neuropathy and negative tinel sign.     Dermatologic: Skin is intact to both lower extremities without significant lesions, rash or abrasion.  Normal texture turgor and tone. No paronychia or evidence of soft tissue infection is noted.    Musculoskeletal: Patient is ambulatory " without assistive device or brace. Pain is noted with firm palpation along the medial band of the plantar fascia right foot most notably at the origination upon the calcaneus not through the arch.  No pain with compression of the calcaneus medial to lateral or with palpation of the achilles, peroneal or posterior tibial tendons.  Slightly more than 0  of ankle joint dorsiflexion without crepitus or pain throughout the ankle, subtalar or midtarsal joints.  No pain or limitations throughout manual muscle strength testing plus 5/5 to all four quadrants bilateral.  No palpable edema noted.      Radiographs:  Osteophyte noted about the plantar calcaneus consistent with plantar fasciitis.      ASSESSMENT:       ICD-10-CM    1. Plantar fasciitis, right  M72.2 XR Foot Right G/E 3 Views     Orthotics and Prosthetics DME Orthotic; Foot Orthotics     Physical Therapy Referral     Ankle/Foot Bracing Supplies DME Plantar Fasciitis Splint (S/M anterior); Right          PLAN:  Reviewed patient's chart in [facility] and discussed etiology and treatment options.      Treatments:  12/6/2023  Obtained and interpreted radiographs.   Discontinue barefoot walking or unsupported walking in shoes without shank.  Dispensed written instructions to obtain appropriate shoe gear and/or OTC inserts.  Dispensed anterior night splint to use all night every night.    No NSAIDs for age related risk  Placed order for physical therapy  Prescription for custom molded orthotics 12/6/2023  Follow up in 4-5 weeks       Hadley Rousseau DPM          Again, thank you for allowing me to participate in the care of your patient.        Sincerely,        Hadley Rousseau DPM

## 2023-12-06 NOTE — PATIENT INSTRUCTIONS
PLANTAR FASCIITIS  The  plantar fascia  is a thick fibrous layer of tissue that covers the bones on the bottom of your foot. It supports the foot bones in an arched position.  Plantar fasciitis  is a painful inflammation of the plantar fascia due to overuse. This can develop gradually or suddenly. It usually affects one foot at a time but can affect both feet. Heel pain can be sharp and feel like a knife sticking in the bottom of your foot. Pain may occur after exercising, long distance jogging, stair climbing, long periods of standing, or after getting up from a seated position.  Risk factors include arthritis, diabetes, obesity or recent weight gain, flat-foot, high arch, wearing high heels or loose shoes or shoes with poor arch support.  Sudden changes in activity or shoe gear may contribute to symptoms.  Foot pain from this condition is usually worse in the morning and improves with walking. By the end of the day there may be a dull aching. Treatment requires improved support of feet, short-term rest and controlling inflammation. It may take up to nine months before all symptoms go away with the measures described below.  A steroid injection into the foot, or surgery may be needed if this is becomes long standing or severe.  HOME CARE  If you are overweight, lose weight to promote healing.  Choose supportive shoes (stiff through the shank) with good arch support and shock absorbency. Replace athletic shoes when they become worn out. Don t walk or run barefoot.  Shoe inserts are an important part of treatment. You can buy off-the-shelf shoe inserts inexpensively such as [employer].  The best ones are custom molded to your foot with a prescription.  Night splints keep the plantar fascia gently stretched while you sleep and will eliminate morning pain. Wear it ALL NIGHT EVERY NIGHT, or any time you sit for a long time.  Reduce by 10% or more the activities that stress the feet: jogging, prolonged standing or  walking, high impact sports, etc.  Stretch your feet. Gently flex your ankle by leaning into a wall or counter or drop your heel from a step.  Stretch two minutes of every hour you are awake.  Icing or massage may help heel pain. Apply an ice pack or frozen water or Coke bottle to the heel for 10-20 minutes as a preventive or after an acute flare of symptoms. You may repeat this as needed.   Follow up with your Doctor in 3 weeks as instructed.   Reliable shoe stores: To maximize your experience and provide the best possible fit.  Be sure to show them your foot concerns and tell them Dr. Rousseau sent you.      Stores listed in bold have only athletic shoes, and stores that are not bold are mostly casual or variety of shoes    [employer]  [address] [place] [address]  [phone]     [employer] [facility]  [address] [place] [address]  [phone]     [employer]  [address] [place] [address]  [phone]    [employer]  [address] [facility] [address]  [phone]    [employer]  [address] [place], [place] [address] [phone]    [employer]  [address] [place] [address]  [phone]                         [employer] Locations:     [address] [place] [address]   [phone]     [address] [place] [address] [address] [place] [address]   [phone]     [address] [place] [address]   [phone]     [address] [facility] [place] [address]   [phone]     [address] [place] [address]   [phone]     [address] [place] [address]   [phone]     [address] [facility] [place]   [place] [address]   [phone]     [address]   [place], [place] [address] [phone]             How to find reasonable shoes          The correct width    Correct Fitting    Correct Length      Foot Distortion    Posture Distortion                           Torsional Rigidity      Grasp behind the heel and underneath the foot and twist      Bad    Excessive torsion/twist in midfoot     Less torsion/twist in midfoot is better                   Heel Counter Rigidity      Grasp just above   midsole and squeeze      Bad    Soft heel counter      Good    Rigid Heel Counter      Flexion Rigidity      Grasp shoe and bend from forefoot to rearfoot

## 2023-12-06 NOTE — NURSING NOTE
Dispensed 1 Dorsal (Anterior) Night Splint, Size S/M, with [facility] agreement signed by patient. Ada Meehan CMA, December 6, 2023

## 2023-12-06 NOTE — PROGRESS NOTES
HPI:  Elisabeth Voss is a 73 year old female who is seen in consultation at the request of self.    Pt presents for eval of:   (Onset, Location, L/R, Character, Treatments, Injury if yes)    XR Right foot 12/6/2023    Left plantar fascia surgery 25 years ago     Onset April 2023, plantar Right heel pain.  No injury noted. Presents with [employer] athletic shoes. Accompanied by her .  Sharp, stabbing pain 10/10 with every step. Unable to put full weight on heel. Intermittent throbbing, burning pain.    Temporary relief:  Rest, elevation, rolling on bottle of ice, OTC Birkenstock insert, silicone heel cup, Aleve 2 times daily, CBD gummies.  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      ROS: 10 point ROS neg other than the symptoms noted above in the HPI.    Patient Active Problem List   Diagnosis    Advanced directives, counseling/discussion    Hyperlipidemia LDL goal <160    Malignant melanoma of skin of face (H)    Hypothyroidism    Meningioma (H)    Chondromalacia of patellofemoral joint, left    Tear of medial meniscus of knee, left, initial encounter    Cataracts, bilateral    Glaucoma suspect    Epiretinal membrane (ERM) of right eye    Myopia, bilateral    Astigmatism, bilateral    Pseudophakia of both eyes    History of melanoma    Gastroesophageal reflux disease with esophagitis    Varicose veins of leg with pain, right    Calcific tendinitis of right shoulder    Impingement syndrome of shoulder region, right    Acute bursitis of right shoulder    Cervical radiculopathy    Primary osteoarthritis of first carpometacarpal joint of left hand       PAST MEDICAL HISTORY:   Past Medical History:   Diagnosis Date    Cancer (H)     melanoma of cheek    History of blood transfusion     with hysterectomy many years ago    Lyme disease     Motion sickness     Thyroid disease      partial thyroidectomy 8/2014     PAST SURGICAL HISTORY:   Past Surgical History:   Procedure Laterality Date    ABDOMEN SURGERY      APPENDECTOMY      ARTHROSCOPY KNEE WITH MEDIAL MENISCECTOMY Left 09/20/2016    Procedure: ARTHROSCOPY KNEE WITH MEDIAL MENISCECTOMY;  Surgeon: Maximilian Ochoa DO;  [facility]    BIOPSY NODE SENTINEL N/A 08/26/2014    Procedure: BIOPSY NODE SENTINEL;  Surgeon: Eliza Foss MD;  [facility]    BLEPHAROPLASTY BILATERAL Bilateral 01/11/2018    Procedure: BLEPHAROPLASTY BILATERAL;  Bilateral upper eyelid blepharoplasty;  Surgeon: Jame Painter MD;  [facility]    CATARACT IOL, RT/LT Bilateral OD 12/22/16-OS 12/8/16    CHOLECYSTECTOMY  long time ago    COLONOSCOPY  03/22/2013    Procedure: COLONOSCOPY;  colonoscopy;  Surgeon: Mike Lr MD;  [facility]    COSMETIC SURGERY  august 2014    face-August 2014    HYSTERECTOMY, PAP NO LONGER INDICATED      ORTHOPEDIC SURGERY      shoulder surgery    PHACOEMULSIFICATION WITH STANDARD INTRAOCULAR LENS IMPLANT Left 12/08/2016    Procedure: PHACOEMULSIFICATION WITH STANDARD INTRAOCULAR LENS IMPLANT;  Surgeon: Jame Painter MD;  [facility]    PHACOEMULSIFICATION WITH STANDARD INTRAOCULAR LENS IMPLANT Right 12/22/2016    Procedure: PHACOEMULSIFICATION WITH STANDARD INTRAOCULAR LENS IMPLANT;  Surgeon: Jame Painter MD;  [facility]    RECONSTRUCT NOSE STAGE ONE N/A 09/05/2014    Procedure: RECONSTRUCT NOSE STAGE ONE;  Surgeon: Levy Holbrook MD;  [facility]    REVISE SCAR FACE N/A 08/26/2014    Procedure: REVISE SCAR FACE;  Surgeon: Eliza Foss MD;  [facility]    SOFT TISSUE SURGERY  2010    bisept tendon-left arm    SUTURE SUSPENSIONPLASTY THUMB Left 01/31/2022    Procedure: Left EXCISION, TRAPEZIUM, WITH SUTURE SUSPENSIONPLASTY;  Surgeon: Maximilian Ochoa DO;  Location: [facility]    THYROIDECTOMY Left 08/26/2014    Procedure:  THYROIDECTOMY;  Surgeon: Eliza Foss MD;  Location:  OR    [facility] TOTAL ABDOM HYSTERECTOMY  11/16/1993    Wili NEVILLE urethropexy     MEDICATIONS:   Current Outpatient Medications:     levothyroxine (SYNTHROID/LEVOTHROID) 50 MCG tablet, Take 1 tablet by mouth once daily, Disp: 90 tablet, Rfl: 0    Naproxen Sodium (ALEVE PO), , Disp: , Rfl:     NEW MED, CBD gummies, Disp: , Rfl:     pantoprazole (PROTONIX) 40 MG EC tablet, Take 1 tablet by mouth once daily, Disp: 90 tablet, Rfl: 0    simvastatin (ZOCOR) 20 MG tablet, Take 1 tablet by mouth once daily, Disp: 90 tablet, Rfl: 0  ALLERGIES:    Allergies   Allergen Reactions    No Known Drug Allergy      SOCIAL HISTORY:   Social History     Socioeconomic History    Marital status:      Spouse name: Not on file    Number of children: Not on file    Years of education: Not on file    Highest education level: Not on file   Occupational History    Not on file   Tobacco Use    Smoking status: Never    Smokeless tobacco: Never   Vaping Use    Vaping Use: Never used   Substance and Sexual Activity    Alcohol use: Yes     Comment: 1 beer a month    Drug use: No    Sexual activity: Yes     Partners: Male     Birth control/protection: Female Surgical     Comment: menopausal   Other Topics Concern    Parent/sibling w/ CABG, MI or angioplasty before 65F 55M? No   Social History Narrative    Not on file     Social Determinants of Health     Financial Resource Strain: Not on file   Food Insecurity: Not on file   Transportation Needs: Not on file   Physical Activity: Not on file   Stress: Not on file   Social Connections: Not on file   Interpersonal Safety: Not on file   Housing Stability: Not on file     FAMILY HISTORY:   Family History   Problem Relation Age of Onset    Cancer Mother 45        lung cancer    Other Cancer Mother         lung    Cerebrovascular Disease Father         bulge in aorta    Coronary Artery Disease Father     Hypertension Father     Heart  "Disease Maternal Grandfather     Heart Disease Paternal Grandfather     Cancer Sister         esophageal     Other Cancer Sister         esophogial    Cancer Paternal Grandmother     Cancer Brother 65        Thyroid    Diabetes Brother     Other Cancer Brother         thyroid    Other Cancer Sister         lung       EXAM:Vitals: /86 (BP Location: Left arm, Patient Position: Sitting, Cuff Size: Adult Regular)   Pulse 58   Temp 98.7  F (37.1  C) (Temporal)   Ht 1.626 m (5' 4\")   Wt 78 kg (172 lb)   LMP  (LMP Unknown)   BMI 29.52 kg/m    BMI= Body mass index is 29.52 kg/m .    General appearance: Patient is alert and fully cooperative with history & exam.  No sign of distress is noted during the visit.     Psychiatric: Affect is pleasant & appropriate.  Patient appears motivated to improve health.     Respiratory: Breathing is regular & unlabored while sitting.     HEENT: Hearing is intact to spoken word.  Speech is clear.  No gross evidence of visual impairment that would impact ambulation.     Vascular: DP & PT 2/4 & regular bilaterally.  No significant edema, rubor or varicosities noted.  CFT and skin temperature is normal to both lower extremities.       Neurologic: Lower extremity sensation is intact to light touch.  No evidence of weakness in the lower extremities.  No evidence of neuropathy and negative tinel sign.     Dermatologic: Skin is intact to both lower extremities without significant lesions, rash or abrasion.  Normal texture turgor and tone. No paronychia or evidence of soft tissue infection is noted.    Musculoskeletal: Patient is ambulatory without assistive device or brace. Pain is noted with firm palpation along the medial band of the plantar fascia right foot most notably at the origination upon the calcaneus not through the arch.  No pain with compression of the calcaneus medial to lateral or with palpation of the achilles, peroneal or posterior tibial tendons.  Slightly more than 0  " of ankle joint dorsiflexion without crepitus or pain throughout the ankle, subtalar or midtarsal joints.  No pain or limitations throughout manual muscle strength testing plus 5/5 to all four quadrants bilateral.  No palpable edema noted.      Radiographs:  Osteophyte noted about the plantar calcaneus consistent with plantar fasciitis.      ASSESSMENT:       ICD-10-CM    1. Plantar fasciitis, right  M72.2 XR Foot Right G/E 3 Views     Orthotics and Prosthetics DME Orthotic; Foot Orthotics     Physical Therapy Referral     Ankle/Foot Bracing Supplies DME Plantar Fasciitis Splint (S/M anterior); Right          PLAN:  Reviewed patient's chart in [facility] and discussed etiology and treatment options.      Treatments:  12/6/2023  Obtained and interpreted radiographs.   Discontinue barefoot walking or unsupported walking in shoes without shank.  Dispensed written instructions to obtain appropriate shoe gear and/or OTC inserts.  Dispensed anterior night splint to use all night every night.    No NSAIDs for age related risk  Placed order for physical therapy  Prescription for custom molded orthotics 12/6/2023  Follow up in 4-5 weeks       Hadley Rousseau DPM

## 2023-12-07 ENCOUNTER — TELEPHONE (OUTPATIENT)
Dept: PODIATRY | Facility: CLINIC | Age: 73
End: 2023-12-07
Payer: COMMERCIAL

## 2023-12-07 NOTE — TELEPHONE ENCOUNTER
Health Call Center    Phone Message    May a detailed message be left on voicemail: yes     Reason for Call: Other: Patient is calling in because she was told to follow up with Dr Rousseau in 3 weeks, but will not have her first orthotics appointment to get them ordered until January. Please call patient to inform her if she should still be seen in 3 weeks or wait until after orthotics are obtained.     Action Taken: Other: [id number]    Travel Screening: Not Applicable

## 2023-12-07 NOTE — TELEPHONE ENCOUNTER
Patient was advised that she can wait to follow-up until after orthotics are obtained and she has had a chance to wear them. Patient was also advised that if she experiences any new or worsening symptoms she should follow-up sooner. She verbalized understanding and has no further questions at this time.     Aislinn Kirby RN   [facility]

## 2024-01-06 DIAGNOSIS — K21.00 GASTROESOPHAGEAL REFLUX DISEASE WITH ESOPHAGITIS, UNSPECIFIED WHETHER HEMORRHAGE: ICD-10-CM

## 2024-01-06 DIAGNOSIS — E78.5 HYPERLIPIDEMIA LDL GOAL <160: ICD-10-CM

## 2024-01-06 DIAGNOSIS — E03.8 OTHER SPECIFIED HYPOTHYROIDISM: ICD-10-CM

## 2024-01-08 RX ORDER — SIMVASTATIN 20 MG
TABLET ORAL
Qty: 90 TABLET | Refills: 1 | Status: SHIPPED | OUTPATIENT
Start: 2024-01-08 | End: 2024-08-02

## 2024-01-08 RX ORDER — LEVOTHYROXINE SODIUM 50 UG/1
TABLET ORAL
Qty: 90 TABLET | Refills: 1 | Status: SHIPPED | OUTPATIENT
Start: 2024-01-08 | End: 2024-07-12

## 2024-01-08 RX ORDER — PANTOPRAZOLE SODIUM 40 MG/1
TABLET, DELAYED RELEASE ORAL
Qty: 90 TABLET | Refills: 0 | Status: SHIPPED | OUTPATIENT
Start: 2024-01-08 | End: 2024-04-05

## 2024-01-24 ENCOUNTER — OFFICE VISIT (OUTPATIENT)
Dept: FAMILY MEDICINE | Facility: OTHER | Age: 74
End: 2024-01-24
Payer: COMMERCIAL

## 2024-01-24 ENCOUNTER — TELEPHONE (OUTPATIENT)
Dept: FAMILY MEDICINE | Facility: OTHER | Age: 74
End: 2024-01-24

## 2024-01-24 VITALS
WEIGHT: 166 LBS | HEART RATE: 78 BPM | TEMPERATURE: 98 F | BODY MASS INDEX: 28.34 KG/M2 | RESPIRATION RATE: 16 BRPM | HEIGHT: 64 IN | OXYGEN SATURATION: 98 % | DIASTOLIC BLOOD PRESSURE: 56 MMHG | SYSTOLIC BLOOD PRESSURE: 101 MMHG

## 2024-01-24 DIAGNOSIS — C43.30 MALIGNANT MELANOMA OF SKIN OF FACE (H): ICD-10-CM

## 2024-01-24 DIAGNOSIS — E03.9 HYPOTHYROIDISM, UNSPECIFIED TYPE: ICD-10-CM

## 2024-01-24 DIAGNOSIS — R94.31 ABNORMAL ELECTROCARDIOGRAM: ICD-10-CM

## 2024-01-24 DIAGNOSIS — E78.5 HYPERLIPIDEMIA LDL GOAL <160: ICD-10-CM

## 2024-01-24 DIAGNOSIS — R42 DIZZINESS: ICD-10-CM

## 2024-01-24 DIAGNOSIS — D32.9 MENINGIOMA (H): Primary | ICD-10-CM

## 2024-01-24 LAB
ALBUMIN SERPL BCG-MCNC: 4.6 G/DL (ref 3.5–5.2)
ALBUMIN UR-MCNC: NEGATIVE MG/DL
ALP SERPL-CCNC: 53 U/L (ref 40–150)
ALT SERPL W P-5'-P-CCNC: 15 U/L (ref 0–50)
ANION GAP SERPL CALCULATED.3IONS-SCNC: 10 MMOL/L (ref 7–15)
APPEARANCE UR: CLEAR
AST SERPL W P-5'-P-CCNC: 24 U/L (ref 0–45)
BASOPHILS # BLD AUTO: 0 10E3/UL (ref 0–0.2)
BASOPHILS NFR BLD AUTO: 1 %
BILIRUB SERPL-MCNC: 0.6 MG/DL
BILIRUB UR QL STRIP: ABNORMAL
BUN SERPL-MCNC: 13.6 MG/DL (ref 8–23)
CALCIUM SERPL-MCNC: 9.7 MG/DL (ref 8.8–10.2)
CHLORIDE SERPL-SCNC: 104 MMOL/L (ref 98–107)
CHOLEST SERPL-MCNC: 205 MG/DL
COLOR UR AUTO: YELLOW
CREAT SERPL-MCNC: 1.14 MG/DL (ref 0.51–0.95)
DEPRECATED HCO3 PLAS-SCNC: 28 MMOL/L (ref 22–29)
EGFRCR SERPLBLD CKD-EPI 2021: 50 ML/MIN/1.73M2
EOSINOPHIL # BLD AUTO: 0.1 10E3/UL (ref 0–0.7)
EOSINOPHIL NFR BLD AUTO: 2 %
ERYTHROCYTE [DISTWIDTH] IN BLOOD BY AUTOMATED COUNT: 14.6 % (ref 10–15)
FASTING STATUS PATIENT QL REPORTED: YES
GLUCOSE SERPL-MCNC: 98 MG/DL (ref 70–99)
GLUCOSE UR STRIP-MCNC: NEGATIVE MG/DL
HBA1C MFR BLD: NORMAL %
HCT VFR BLD AUTO: 40.8 % (ref 35–47)
HDLC SERPL-MCNC: 64 MG/DL
HGB BLD-MCNC: 13.6 G/DL (ref 11.7–15.7)
HGB UR QL STRIP: NEGATIVE
IMM GRANULOCYTES # BLD: 0 10E3/UL
IMM GRANULOCYTES NFR BLD: 0 %
KETONES UR STRIP-MCNC: ABNORMAL MG/DL
LDLC SERPL CALC-MCNC: 122 MG/DL
LEUKOCYTE ESTERASE UR QL STRIP: NEGATIVE
LYMPHOCYTES # BLD AUTO: 2 10E3/UL (ref 0.8–5.3)
LYMPHOCYTES NFR BLD AUTO: 37 %
MCH RBC QN AUTO: 30.2 PG (ref 26.5–33)
MCHC RBC AUTO-ENTMCNC: 33.3 G/DL (ref 31.5–36.5)
MCV RBC AUTO: 91 FL (ref 78–100)
MONOCYTES # BLD AUTO: 0.4 10E3/UL (ref 0–1.3)
MONOCYTES NFR BLD AUTO: 7 %
NEUTROPHILS # BLD AUTO: 2.9 10E3/UL (ref 1.6–8.3)
NEUTROPHILS NFR BLD AUTO: 54 %
NITRATE UR QL: NEGATIVE
NONHDLC SERPL-MCNC: 141 MG/DL
PH UR STRIP: 6 [PH] (ref 5–7)
PLATELET # BLD AUTO: 250 10E3/UL (ref 150–450)
POTASSIUM SERPL-SCNC: 4.2 MMOL/L (ref 3.4–5.3)
PROT SERPL-MCNC: 7 G/DL (ref 6.4–8.3)
RBC # BLD AUTO: 4.51 10E6/UL (ref 3.8–5.2)
SODIUM SERPL-SCNC: 142 MMOL/L (ref 135–145)
SP GR UR STRIP: 1.02 (ref 1–1.03)
TRIGL SERPL-MCNC: 94 MG/DL
TSH SERPL DL<=0.005 MIU/L-ACNC: 1.18 UIU/ML (ref 0.3–4.2)
UROBILINOGEN UR STRIP-ACNC: 1 E.U./DL
WBC # BLD AUTO: 5.4 10E3/UL (ref 4–11)

## 2024-01-24 PROCEDURE — 99214 OFFICE O/P EST MOD 30 MIN: CPT | Mod: 25 | Performed by: NURSE PRACTITIONER

## 2024-01-24 PROCEDURE — 83036 HEMOGLOBIN GLYCOSYLATED A1C: CPT | Performed by: NURSE PRACTITIONER

## 2024-01-24 PROCEDURE — 80053 COMPREHEN METABOLIC PANEL: CPT | Performed by: NURSE PRACTITIONER

## 2024-01-24 PROCEDURE — 93000 ELECTROCARDIOGRAM COMPLETE: CPT | Mod: 59 | Performed by: NURSE PRACTITIONER

## 2024-01-24 PROCEDURE — 80061 LIPID PANEL: CPT | Performed by: NURSE PRACTITIONER

## 2024-01-24 PROCEDURE — 81003 URINALYSIS AUTO W/O SCOPE: CPT | Performed by: NURSE PRACTITIONER

## 2024-01-24 PROCEDURE — 85025 COMPLETE CBC W/AUTO DIFF WBC: CPT | Performed by: NURSE PRACTITIONER

## 2024-01-24 PROCEDURE — 36415 COLL VENOUS BLD VENIPUNCTURE: CPT | Performed by: NURSE PRACTITIONER

## 2024-01-24 PROCEDURE — 93242 EXT ECG>48HR<7D RECORDING: CPT | Performed by: NURSE PRACTITIONER

## 2024-01-24 PROCEDURE — 84443 ASSAY THYROID STIM HORMONE: CPT | Performed by: NURSE PRACTITIONER

## 2024-01-24 ASSESSMENT — PATIENT HEALTH QUESTIONNAIRE - PHQ9
SUM OF ALL RESPONSES TO PHQ QUESTIONS 1-9: 7
10. IF YOU CHECKED OFF ANY PROBLEMS, HOW DIFFICULT HAVE THESE PROBLEMS MADE IT FOR YOU TO DO YOUR WORK, TAKE CARE OF THINGS AT HOME, OR GET ALONG WITH OTHER PEOPLE: NOT DIFFICULT AT ALL
SUM OF ALL RESPONSES TO PHQ QUESTIONS 1-9: 7

## 2024-01-24 ASSESSMENT — PAIN SCALES - GENERAL: PAINLEVEL: NO PAIN (0)

## 2024-01-24 NOTE — TELEPHONE ENCOUNTER
Please have lab cancel Hemoglobin A1C  This was ordered incidentally      CORAL Rudolph CNP  Questions or concerns please feel free to send me a [facility] message or call me  Phone : [phone]

## 2024-01-24 NOTE — PROGRESS NOTES
Assessment & Plan     Meningioma (H)  Patient having some mood changes along with balance concerns  Given she has a history of a brain meningioma and this has not been rechecked since 2016 I did reorder this for her. I also recommend labs and work up as noted below.   - MR Brain w/o & w Contrast; Future    Malignant melanoma of skin of face (H)  Followed by Oncology     Hyperlipidemia LDL goal <160  Recheck today fasting.   The 10-year ASCVD risk score (Fernanda FERNANDEZ, et al., 2019) is: 9.3%    Values used to calculate the score:      Age: 73 years      Sex: Female      Is Non- : No      Diabetic: No      Tobacco smoker: No      Systolic Blood Pressure: 101 mmHg      Is BP treated: No      HDL Cholesterol: 74 mg/dL      Total Cholesterol: 207 mg/dL  - Lipid panel reflex to direct LDL Fasting; Future  - Lipid panel reflex to direct LDL Fasting  Continues statin without side effects.     Hypothyroidism, unspecified type  Recheck, was normal back in July.   Given her concerns for dizziness and agitation I recommend recheck.   - TSH with free T4 reflex; Future  - TSH with free T4 reflex    Dizziness  Labs today  Recommend since her EKG was abnormal for anterior fascicular block. Will order Holter testing and Echocardiogram to rule out arrhythmia or pathophysiological dysfunctions. Notes that this can happen when standing or when getting up from sitting to standing. No syncope. Not consistent with vertigo. Neuro exam normal.   If MRI normal and cardiovascular testing is normal then I would recommend OT exam and neuro consult.   - UA Macroscopic with reflex to Microscopic and Culture - Lab Collect; Future  - Comprehensive metabolic panel (BMP + Alb, Alk Phos, ALT, AST, Total. Bili, TP); Future  - CBC with platelets and differential; Future  - EKG 12-lead complete w/read - Clinics  - Echocardiogram Complete; Future  - UA Macroscopic with reflex to Microscopic and Culture - Lab Collect  - Comprehensive  metabolic panel (BMP + Alb, Alk Phos, ALT, AST, Total. Bili, TP)  - CBC with platelets and differential    Abnormal electrocardiogram  As noted above  Denies any chest pain, shortness of breath or palpitations.   - Echocardiogram Complete; Future    The patient indicates understanding of these issues and agrees with the plan.        See Patient Instructions    Subjective   Rory is a 73 year old, presenting for the following health issues:  Brain Tumor (Follow Up)      1/24/2024     8:42 AM   Additional Questions   Roomed by Fransisca PHIPPS   Accompanied by  Shubham         1/24/2024     8:42 AM   Patient Reported Additional Medications   Patient reports taking the following new medications b-complex, vit d3, multi vit     Via the Health Maintenance questionnaire, the patient has reported the following services have been completed -Eye Exam, this information has been sent to the abstraction team.  History of Present Illness       Hyperlipidemia:  She presents for follow up of hyperlipidemia.   She is taking medication to lower cholesterol. She is not having myalgia or other side effects to statin medications.    She eats 4 or more servings of fruits and vegetables daily.She consumes 0 sweetened beverage(s) daily.She exercises with enough effort to increase her heart rate 9 or less minutes per day.  She exercises with enough effort to increase her heart rate 3 or less days per week.   She is taking medications regularly.       Would like to have blood work done, balance has been off, has been getting angry easily, has had some lightheadedness, notes episodic pinpoint bright royal blue spots in vision for about 5 seconds at a time then are gone.     Had brain imaging years ago that showed a meningioma.   No new medications  Vitamin B   Multivitamin   D3   Had eye exam no abnormal findings.         Review of Systems  Constitutional, HEENT, cardiovascular, pulmonary, gi and gu systems are negative, except as otherwise  "noted.      Objective    /56 (Cuff Size: Adult Regular)   Pulse 78   Temp 98  F (36.7  C) (Oral)   Resp 16   Ht 1.62 m (5' 3.78\")   Wt 75.3 kg (166 lb)   LMP  (LMP Unknown)   SpO2 98%   BMI 28.69 kg/m    Body mass index is 28.69 kg/m .  Physical Exam   GENERAL: alert and no distress  EYES: Eyes grossly normal to inspection, PERRL and conjunctivae and sclerae normal  HENT: ear canals and TM's normal, nose and mouth without ulcers or lesions  NECK: no adenopathy, no asymmetry, masses, or scars  RESP: lungs clear to auscultation - no rales, rhonchi or wheezes  CV: regular rate and rhythm, normal S1 S2, no S3 or S4, no murmur, click or rub, no peripheral edema  ABDOMEN: soft, nontender, no hepatosplenomegaly, no masses and bowel sounds normal  MS: no gross musculoskeletal defects noted, no edema  SKIN: no suspicious lesions or rashes  NEURO: Normal strength and tone, sensory exam grossly normal, mentation intact, speech normal, cranial nerves 2-12 intact, and gait normal including heel/toe/tandem walking  PSYCH: mentation appears normal, affect normal/bright    Results for orders placed or performed in visit on 01/24/24   UA Macroscopic with reflex to Microscopic and Culture - Lab Collect     Status: Abnormal    Specimen: Urine, NOS   Result Value Ref Range    Color Urine Yellow Colorless, Straw, Light Yellow, Yellow    Appearance Urine Clear Clear    Glucose Urine Negative Negative mg/dL    Bilirubin Urine Small (A) Negative    Ketones Urine Trace (A) Negative mg/dL    Specific Gravity Urine 1.020 1.003 - 1.035    Blood Urine Negative Negative    pH Urine 6.0 5.0 - 7.0    Protein Albumin Urine Negative Negative mg/dL    Urobilinogen Urine 1.0 0.2, 1.0 E.U./dL    Nitrite Urine Negative Negative    Leukocyte Esterase Urine Negative Negative    Narrative    Microscopic not indicated   CBC with platelets and differential     Status: None   Result Value Ref Range    WBC Count 5.4 4.0 - 11.0 10e3/uL    RBC " Count 4.51 3.80 - 5.20 10e6/uL    Hemoglobin 13.6 11.7 - 15.7 g/dL    Hematocrit 40.8 35.0 - 47.0 %    MCV 91 78 - 100 fL    MCH 30.2 26.5 - 33.0 pg    MCHC 33.3 31.5 - 36.5 g/dL    RDW 14.6 10.0 - 15.0 %    Platelet Count 250 150 - 450 10e3/uL    % Neutrophils 54 %    % Lymphocytes 37 %    % Monocytes 7 %    % Eosinophils 2 %    % Basophils 1 %    % Immature Granulocytes 0 %    Absolute Neutrophils 2.9 1.6 - 8.3 10e3/uL    Absolute Lymphocytes 2.0 0.8 - 5.3 10e3/uL    Absolute Monocytes 0.4 0.0 - 1.3 10e3/uL    Absolute Eosinophils 0.1 0.0 - 0.7 10e3/uL    Absolute Basophils 0.0 0.0 - 0.2 10e3/uL    Absolute Immature Granulocytes 0.0 <=0.4 10e3/uL   Hemoglobin A1c     Status: Normal   Result Value Ref Range    Hemoglobin A1C 5.4 0.0 - 5.6 %   CBC with platelets and differential     Status: None    Narrative    The following orders were created for panel order CBC with platelets and differential.  Procedure                               Abnormality         Status                     ---------                               -----------         ------                     CBC with platelets and d...[[id number]]                      Final result                 Please view results for these tests on the individual orders.           Signed Electronically by: CORAL Rudolph CNP

## 2024-02-01 ENCOUNTER — HOSPITAL ENCOUNTER (OUTPATIENT)
Dept: CARDIOLOGY | Facility: CLINIC | Age: 74
Discharge: HOME | End: 2024-02-01
Attending: NURSE PRACTITIONER | Admitting: NURSE PRACTITIONER
Payer: COMMERCIAL

## 2024-02-01 DIAGNOSIS — R42 DIZZINESS: ICD-10-CM

## 2024-02-01 DIAGNOSIS — R94.31 ABNORMAL ELECTROCARDIOGRAM: ICD-10-CM

## 2024-02-01 LAB — LVEF ECHO: NORMAL

## 2024-02-01 PROCEDURE — 93306 TTE W/DOPPLER COMPLETE: CPT

## 2024-02-01 PROCEDURE — 93306 TTE W/DOPPLER COMPLETE: CPT | Mod: 26 | Performed by: INTERNAL MEDICINE

## 2024-02-02 ENCOUNTER — HOSPITAL ENCOUNTER (OUTPATIENT)
Dept: MRI IMAGING | Facility: CLINIC | Age: 74
Discharge: HOME | End: 2024-02-02
Attending: NURSE PRACTITIONER | Admitting: NURSE PRACTITIONER
Payer: COMMERCIAL

## 2024-02-02 ENCOUNTER — TELEPHONE (OUTPATIENT)
Dept: FAMILY MEDICINE | Facility: OTHER | Age: 74
End: 2024-02-02
Payer: COMMERCIAL

## 2024-02-02 DIAGNOSIS — R42 DIZZINESS: Primary | ICD-10-CM

## 2024-02-02 DIAGNOSIS — D32.9 MENINGIOMA (H): ICD-10-CM

## 2024-02-02 DIAGNOSIS — E78.5 HYPERLIPIDEMIA LDL GOAL <160: ICD-10-CM

## 2024-02-02 PROCEDURE — 70553 MRI BRAIN STEM W/O & W/DYE: CPT

## 2024-02-02 PROCEDURE — A9585 GADOBUTROL INJECTION: HCPCS | Performed by: NURSE PRACTITIONER

## 2024-02-02 PROCEDURE — 255N000002 HC RX 255 OP 636: Performed by: NURSE PRACTITIONER

## 2024-02-02 RX ORDER — GADOBUTROL 604.72 MG/ML
7.5 INJECTION INTRAVENOUS ONCE
Status: COMPLETED | OUTPATIENT
Start: 2024-02-02 | End: 2024-02-02

## 2024-02-02 RX ORDER — ATORVASTATIN CALCIUM 10 MG/1
10 TABLET, FILM COATED ORAL DAILY
Qty: 90 TABLET | Refills: 1 | Status: SHIPPED | OUTPATIENT
Start: 2024-02-02 | End: 2024-07-12

## 2024-02-02 RX ADMIN — GADOBUTROL 7.5 ML: 604.72 INJECTION INTRAVENOUS at 09:30

## 2024-02-02 NOTE — TELEPHONE ENCOUNTER
Having a blue spot in her vision at times (varies and will last a couple seconds) she told her eye doctor about this, headache in the morning when she gets up in the morning and some dizziness and mood changes. Has had cardiac work up and has a scheduled ultrasound of her carotid artery. However, her brain MRI and there were changes so I would like her to see a neurologist.     Plan-   Consult with neurology regarding brain lesion  Change simvastatin to Lipitor  Carotid ultrasound       Frannie Bernstein, APRN CNP  Questions or concerns please feel free to send me a [email or web address] message or call me  Phone : [phone]

## 2024-02-07 ENCOUNTER — HOSPITAL ENCOUNTER (OUTPATIENT)
Dept: ULTRASOUND IMAGING | Facility: CLINIC | Age: 74
Discharge: HOME | End: 2024-02-07
Attending: NURSE PRACTITIONER | Admitting: NURSE PRACTITIONER
Payer: COMMERCIAL

## 2024-02-07 DIAGNOSIS — R42 DIZZINESS: ICD-10-CM

## 2024-02-07 PROCEDURE — 93880 EXTRACRANIAL BILAT STUDY: CPT

## 2024-02-17 ENCOUNTER — MYC MEDICAL ADVICE (OUTPATIENT)
Dept: FAMILY MEDICINE | Facility: OTHER | Age: 74
End: 2024-02-17
Payer: COMMERCIAL

## 2024-02-19 NOTE — TELEPHONE ENCOUNTER
Looks like your imaging note states you want to talk to pt about results- unsure this was done.     Please advise    Malaika Yuen RN

## 2024-04-05 DIAGNOSIS — K21.00 GASTROESOPHAGEAL REFLUX DISEASE WITH ESOPHAGITIS, UNSPECIFIED WHETHER HEMORRHAGE: ICD-10-CM

## 2024-04-05 RX ORDER — PANTOPRAZOLE SODIUM 40 MG/1
TABLET, DELAYED RELEASE ORAL
Qty: 90 TABLET | Refills: 0 | Status: SHIPPED | OUTPATIENT
Start: 2024-04-05 | End: 2024-07-12

## 2024-05-08 ENCOUNTER — MYC MEDICAL ADVICE (OUTPATIENT)
Dept: FAMILY MEDICINE | Facility: OTHER | Age: 74
End: 2024-05-08
Payer: COMMERCIAL

## 2024-05-08 NOTE — TELEPHONE ENCOUNTER
Patient Quality Outreach    Patient is due for the following:   Physical Annual Wellness  - Due after 7/26/2024    Next Steps:   Schedule a Annual Wellness Visit    Type of outreach:    Sent [employer] message.      Questions for provider review:    None           Esme Luevano MA

## 2024-07-12 DIAGNOSIS — E03.8 OTHER SPECIFIED HYPOTHYROIDISM: ICD-10-CM

## 2024-07-12 DIAGNOSIS — E78.5 HYPERLIPIDEMIA LDL GOAL <160: ICD-10-CM

## 2024-07-12 DIAGNOSIS — K21.00 GASTROESOPHAGEAL REFLUX DISEASE WITH ESOPHAGITIS, UNSPECIFIED WHETHER HEMORRHAGE: ICD-10-CM

## 2024-07-12 RX ORDER — LEVOTHYROXINE SODIUM 50 UG/1
50 TABLET ORAL DAILY
Qty: 90 TABLET | Refills: 3 | Status: SHIPPED | OUTPATIENT
Start: 2024-07-12

## 2024-07-12 RX ORDER — ATORVASTATIN CALCIUM 10 MG/1
10 TABLET, FILM COATED ORAL DAILY
Qty: 90 TABLET | Refills: 1 | Status: SHIPPED | OUTPATIENT
Start: 2024-07-12 | End: 2024-08-02

## 2024-07-12 RX ORDER — PANTOPRAZOLE SODIUM 40 MG/1
40 TABLET, DELAYED RELEASE ORAL DAILY
Qty: 90 TABLET | Refills: 1 | Status: SHIPPED | OUTPATIENT
Start: 2024-07-12

## 2024-08-01 SDOH — HEALTH STABILITY: PHYSICAL HEALTH: ON AVERAGE, HOW MANY MINUTES DO YOU ENGAGE IN EXERCISE AT THIS LEVEL?: 20 MIN

## 2024-08-01 SDOH — HEALTH STABILITY: PHYSICAL HEALTH: ON AVERAGE, HOW MANY DAYS PER WEEK DO YOU ENGAGE IN MODERATE TO STRENUOUS EXERCISE (LIKE A BRISK WALK)?: 7 DAYS

## 2024-08-01 ASSESSMENT — SOCIAL DETERMINANTS OF HEALTH (SDOH): HOW OFTEN DO YOU GET TOGETHER WITH FRIENDS OR RELATIVES?: PATIENT DECLINED

## 2024-08-02 ENCOUNTER — OFFICE VISIT (OUTPATIENT)
Dept: FAMILY MEDICINE | Facility: OTHER | Age: 74
End: 2024-08-02
Payer: COMMERCIAL

## 2024-08-02 VITALS
TEMPERATURE: 97 F | SYSTOLIC BLOOD PRESSURE: 122 MMHG | HEART RATE: 64 BPM | DIASTOLIC BLOOD PRESSURE: 68 MMHG | WEIGHT: 164 LBS | OXYGEN SATURATION: 99 % | BODY MASS INDEX: 28 KG/M2 | RESPIRATION RATE: 20 BRPM | HEIGHT: 64 IN

## 2024-08-02 DIAGNOSIS — R74.8 ELEVATED CREATINE KINASE: ICD-10-CM

## 2024-08-02 DIAGNOSIS — E78.5 HYPERLIPIDEMIA LDL GOAL <160: ICD-10-CM

## 2024-08-02 DIAGNOSIS — Z00.00 ENCOUNTER FOR MEDICARE ANNUAL WELLNESS EXAM: Primary | ICD-10-CM

## 2024-08-02 DIAGNOSIS — E03.9 HYPOTHYROIDISM, UNSPECIFIED TYPE: ICD-10-CM

## 2024-08-02 DIAGNOSIS — H90.3 BILATERAL SENSORINEURAL HEARING LOSS: ICD-10-CM

## 2024-08-02 LAB
ALBUMIN SERPL BCG-MCNC: 4.5 G/DL (ref 3.5–5.2)
ALP SERPL-CCNC: 57 U/L (ref 40–150)
ALT SERPL W P-5'-P-CCNC: 19 U/L (ref 0–50)
ANION GAP SERPL CALCULATED.3IONS-SCNC: 7 MMOL/L (ref 7–15)
AST SERPL W P-5'-P-CCNC: 24 U/L (ref 0–45)
BILIRUB SERPL-MCNC: 0.5 MG/DL
BUN SERPL-MCNC: 9.7 MG/DL (ref 8–23)
CALCIUM SERPL-MCNC: 9.8 MG/DL (ref 8.8–10.4)
CHLORIDE SERPL-SCNC: 105 MMOL/L (ref 98–107)
CHOLEST SERPL-MCNC: 194 MG/DL
CREAT SERPL-MCNC: 1.09 MG/DL (ref 0.51–0.95)
CREAT UR-MCNC: 105.1 MG/DL
EGFRCR SERPLBLD CKD-EPI 2021: 53 ML/MIN/1.73M2
FASTING STATUS PATIENT QL REPORTED: YES
FASTING STATUS PATIENT QL REPORTED: YES
GLUCOSE SERPL-MCNC: 97 MG/DL (ref 70–99)
HCO3 SERPL-SCNC: 29 MMOL/L (ref 22–29)
HDLC SERPL-MCNC: 62 MG/DL
LDLC SERPL CALC-MCNC: 107 MG/DL
MICROALBUMIN UR-MCNC: <12 MG/L
MICROALBUMIN/CREAT UR: NORMAL MG/G{CREAT}
NONHDLC SERPL-MCNC: 132 MG/DL
POTASSIUM SERPL-SCNC: 4.5 MMOL/L (ref 3.4–5.3)
PROT SERPL-MCNC: 6.9 G/DL (ref 6.4–8.3)
SODIUM SERPL-SCNC: 141 MMOL/L (ref 135–145)
TRIGL SERPL-MCNC: 126 MG/DL

## 2024-08-02 PROCEDURE — 80053 COMPREHEN METABOLIC PANEL: CPT | Performed by: NURSE PRACTITIONER

## 2024-08-02 PROCEDURE — G0439 PPPS, SUBSEQ VISIT: HCPCS | Performed by: NURSE PRACTITIONER

## 2024-08-02 PROCEDURE — 80061 LIPID PANEL: CPT | Performed by: NURSE PRACTITIONER

## 2024-08-02 PROCEDURE — 82043 UR ALBUMIN QUANTITATIVE: CPT | Performed by: NURSE PRACTITIONER

## 2024-08-02 PROCEDURE — 82570 ASSAY OF URINE CREATININE: CPT | Performed by: NURSE PRACTITIONER

## 2024-08-02 PROCEDURE — 99214 OFFICE O/P EST MOD 30 MIN: CPT | Mod: 25 | Performed by: NURSE PRACTITIONER

## 2024-08-02 PROCEDURE — 36415 COLL VENOUS BLD VENIPUNCTURE: CPT | Performed by: NURSE PRACTITIONER

## 2024-08-02 RX ORDER — ATORVASTATIN CALCIUM 10 MG/1
10 TABLET, FILM COATED ORAL DAILY
Qty: 90 TABLET | Refills: 3 | Status: SHIPPED | OUTPATIENT
Start: 2024-08-02

## 2024-08-02 ASSESSMENT — PAIN SCALES - GENERAL: PAINLEVEL: NO PAIN (0)

## 2024-08-02 NOTE — PATIENT INSTRUCTIONS
Patient Education   Preventive Care Advice   This is general advice given by our system to help you stay healthy. However, your care team may have specific advice just for you. Please talk to your care team about your preventive care needs.  Nutrition  Eat 5 or more servings of fruits and vegetables each day.  Try wheat bread, brown rice and whole grain pasta (instead of white bread, rice, and pasta).  Get enough calcium and vitamin D. Check the label on foods and aim for 100% of the RDA (recommended daily allowance).  Lifestyle  Exercise at least 150 minutes each week  (30 minutes a day, 5 days a week).  Do muscle strengthening activities 2 days a week. These help control your weight and prevent disease.  No smoking.  Wear sunscreen to prevent skin cancer.  Have a dental exam and cleaning every 6 months.  Yearly exams  See your health care team every year to talk about:  Any changes in your health.  Any medicines your care team has prescribed.  Preventive care, family planning, and ways to prevent chronic diseases.  Shots (vaccines)   HPV shots (up to age 26), if you've never had them before.  Hepatitis B shots (up to age 59), if you've never had them before.  COVID-19 shot: Get this shot when it's due.  Flu shot: Get a flu shot every year.  Tetanus shot: Get a tetanus shot every 10 years.  Pneumococcal, hepatitis A, and RSV shots: Ask your care team if you need these based on your risk.  Shingles shot (for age 50 and up)  General health tests  Diabetes screening:  Starting at age 35, Get screened for diabetes at least every 3 years.  If you are younger than age 35, ask your care team if you should be screened for diabetes.  Cholesterol test: At age 39, start having a cholesterol test every 5 years, or more often if advised.  Bone density scan (DEXA): At age 50, ask your care team if you should have this scan for osteoporosis (brittle bones).  Hepatitis C: Get tested at least once in your life.  STIs (sexually  transmitted infections)  Before age 24: Ask your care team if you should be screened for STIs.  After age 24: Get screened for STIs if you're at risk. You are at risk for STIs (including HIV) if:  You are sexually active with more than one person.  You don't use condoms every time.  You or a partner was diagnosed with a sexually transmitted infection.  If you are at risk for HIV, ask about PrEP medicine to prevent HIV.  Get tested for HIV at least once in your life, whether you are at risk for HIV or not.  Cancer screening tests  Cervical cancer screening: If you have a cervix, begin getting regular cervical cancer screening tests starting at age 21.  Breast cancer scan (mammogram): If you've ever had breasts, begin having regular mammograms starting at age 40. This is a scan to check for breast cancer.  Colon cancer screening: It is important to start screening for colon cancer at age 45.  Have a colonoscopy test every 10 years (or more often if you're at risk) Or, ask your provider about stool tests like a FIT test every year or Cologuard test every 3 years.  To learn more about your testing options, visit:   .  For help making a decision, visit:   https://bit.ly/mp81236.  Prostate cancer screening test: If you have a prostate, ask your care team if a prostate cancer screening test (PSA) at age 55 is right for you.  Lung cancer screening: If you are a current or former smoker ages 50 to 80, ask your care team if ongoing lung cancer screenings are right for you.  For informational purposes only. Not to replace the advice of your health care provider. Copyright   2023 [facility]. All rights reserved. Clinically reviewed by the [facility]. [employer] 846737 - REV 01/24.  Preventing Falls: Care Instructions  Injuries and health problems such as trouble walking or poor eyesight can increase your risk of falling. So can some medicines. But there are things you can do to help  "prevent falls. You can exercise to get stronger. You can also arrange your home to make it safer.    Talk to your doctor about the medicines you take. Ask if any of them increase the risk of falls and whether they can be changed or stopped.   Try to exercise regularly. It can help improve your strength and balance. This can help lower your risk of falling.     Practice fall safety and prevention.    Wear low-heeled shoes that fit well and give your feet good support. Talk to your doctor if you have foot problems that make this hard.  Carry a cellphone or wear a medical alert device that you can use to call for help.  Use stepladders instead of chairs to reach high objects. Don't climb if you're at risk for falls. Ask for help, if needed.  Wear the correct eyeglasses, if you need them.    Make your home safer.    Remove rugs, cords, clutter, and furniture from walkways.  Keep your house well lit. Use night-lights in hallways and bathrooms.  Install and use sturdy handrails on stairways.  Wear nonskid footwear, even inside. Don't walk barefoot or in socks without shoes.    Be safe outside.    Use handrails, curb cuts, and ramps whenever possible.  Keep your hands free by using a shoulder bag or backpack.  Try to walk in well-lit areas. Watch out for uneven ground, changes in pavement, and debris.  Be careful in the winter. Walk on the grass or gravel when sidewalks are slippery. Use de-icer on steps and walkways. Add non-slip devices to shoes.    Put grab bars and nonskid mats in your shower or tub and near the toilet. Try to use a shower chair or bath bench when bathing.   Get into a tub or shower by putting in your weaker leg first. Get out with your strong side first. Have a phone or medical alert device in the bathroom with you.   Where can you learn more?  Go to https://www.[employer].net/patiented  Enter G117 in the search box to learn more about \"Preventing Falls: Care Instructions.\"  Current as of: July 17, " 2023               Content Version: 14.0    0695-2997 [employer].   Care instructions adapted under license by your healthcare professional. If you have questions about a medical condition or this instruction, always ask your healthcare professional. [employer] disclaims any warranty or liability for your use of this information.      Hearing Loss: Care Instructions  Overview     Hearing loss is a sudden or slow decrease in how well you hear. It can range from slight to profound. Permanent hearing loss can occur with aging. It also can happen when you are exposed long-term to loud noise. Examples include listening to loud music, riding motorcycles, or being around other loud machines.  Hearing loss can affect your work and home life. It can make you feel lonely or depressed. You may feel that you have lost your independence. But hearing aids and other devices can help you hear better and feel connected to others.  Follow-up care is a key part of your treatment and safety. Be sure to make and go to all appointments, and call your doctor if you are having problems. It's also a good idea to know your test results and keep a list of the medicines you take.  How can you care for yourself at home?  Avoid loud noises whenever possible. This helps keep your hearing from getting worse.  Always wear hearing protection around loud noises.  Wear a hearing aid as directed.  A professional can help you pick a hearing aid that will work best for you.  You can also get hearing aids over the counter for mild to moderate hearing loss.  Have hearing tests as your doctor suggests. They can show whether your hearing has changed. Your hearing aid may need to be adjusted.  Use other devices as needed. These may include:  Telephone amplifiers and hearing aids that can connect to a television, stereo, radio, or microphone.  Devices that use lights or vibrations. These alert you to the doorbell, a ringing  "telephone, or a baby monitor.  Television closed-captioning. This shows the words at the bottom of the screen. Most new TVs can do this.  TTY (text telephone). This lets you type messages back and forth on the telephone instead of talking or listening. These devices are also called TDD. When messages are typed on the keyboard, they are sent over the phone line to a receiving TTY. The message is shown on a monitor.  Use text messaging, social media, and email if it is hard for you to communicate by telephone.  Try to learn a listening technique called speechreading. It is not lipreading. You pay attention to people's gestures, expressions, posture, and tone of voice. These clues can help you understand what a person is saying. Face the person you are talking to, and have them face you. Make sure the lighting is good. You need to see the other person's face clearly.  Think about counseling if you need help to adjust to your hearing loss.  When should you call for help?  Watch closely for changes in your health, and be sure to contact your doctor if:    You think your hearing is getting worse.     You have new symptoms, such as dizziness or nausea.   Where can you learn more?  Go to https://www.[employer].net/patiented  Enter R798 in the search box to learn more about \"Hearing Loss: Care Instructions.\"  Current as of: September 27, 2023               Content Version: 14.0    9081-2760 [employer].   Care instructions adapted under license by your healthcare professional. If you have questions about a medical condition or this instruction, always ask your healthcare professional. [employer] disclaims any warranty or liability for your use of this information.         "

## 2024-08-02 NOTE — PROGRESS NOTES
"Preventive Care Visit  [facility]  CORAL Rudolph CNP, Family Medicine  Aug 2, 2024      Assessment & Plan     Encounter for Medicare annual wellness exam  Updated HM     Hyperlipidemia LDL goal <160  The 10-year ASCVD risk score (Fernanda FERNANDEZ, et al., 2019) is: 14.8%    Values used to calculate the score:      Age: 74 years      Sex: Female      Is Non- : No      Diabetic: No      Tobacco smoker: No      Systolic Blood Pressure: 122 mmHg      Is BP treated: No      HDL Cholesterol: 64 mg/dL      Total Cholesterol: 205 mg/dL  Continue Lipitor without side effects.   - Lipid panel reflex to direct LDL Fasting; Future  - Comprehensive metabolic panel (BMP + Alb, Alk Phos, ALT, AST, Total. Bili, TP); Future  - atorvastatin (LIPITOR) 10 MG tablet; Take 1 tablet (10 mg) by mouth daily  - Lipid panel reflex to direct LDL Fasting  - Comprehensive metabolic panel (BMP + Alb, Alk Phos, ALT, AST, Total. Bili, TP)    Hypothyroidism, unspecified type  Stable on current dose   Recheck in 1 year     Elevated creatine kinase  Stopped NSAIDS, if still concerns will consider nephrology referral.   - Albumin Random Urine Quantitative with Creat Ratio; Future  - Albumin Random Urine Quantitative with Creat Ratio    Bilateral sensorineural hearing loss    - Adult Audiology  Referral; Future    Patient has been advised of split billing requirements and indicates understanding: Yes        BMI  Estimated body mass index is 28.28 kg/m  as calculated from the following:    Height as of this encounter: 1.622 m (5' 3.86\").    Weight as of this encounter: 74.4 kg (164 lb).   Weight management plan: Discussed healthy diet and exercise guidelines    Counseling  Appropriate preventive services were addressed with this patient via screening, questionnaire, or discussion as appropriate for fall prevention, nutrition, physical activity, Tobacco-use cessation, weight loss and cognition.  " Checklist reviewing preventive services available has been given to the patient.  Reviewed patient's diet, addressing concerns and/or questions.   The patient was instructed to see the dentist every 6 months.   She is at risk for psychosocial distress and has been provided with information to reduce risk.   The patient was provided with written information regarding signs of hearing loss.       See Patient Instructions    Subjective   Rory is a 74 year old, presenting for the following:  Physical        8/2/2024     7:57 AM   Additional Questions   Roomed by Olinda   Accompanied by : Mirella         8/2/2024   Forms   Any forms needing to be completed Yes          8/2/2024     7:57 AM   Patient Reported Additional Medications   Patient reports taking the following new medications Apple Cider Vinagar, B12, D3, Multivitamin       Health Care Directive  Patient has a Health Care Directive on file  Advance care planning document is on file but is outdated.  Patient encouraged to update.    HPI    Wt Readings from Last 2 Encounters:   08/02/24 74.4 kg (164 lb)   01/24/24 75.3 kg (166 lb)         Hyperlipidemia Follow-Up    Are you regularly taking any medication or supplement to lower your cholesterol?   Yes- Simvastatin  Are you having muscle aches or other side effects that you think could be caused by your cholesterol lowering medication?  No    Hypothyroidism Follow-up    Since last visit, patient describes the following symptoms: Weight stable, no hair loss, no skin changes, no constipation, no loose stools        8/1/2024   General Health   How would you rate your overall physical health? Good   Feel stress (tense, anxious, or unable to sleep) Only a little      (!) STRESS CONCERN      8/1/2024   Nutrition   Diet: Regular (no restrictions)            8/1/2024   Exercise   Days per week of moderate/strenous exercise 7 days   Average minutes spent exercising at this level 20 min            8/1/2024   Social  Factors   Frequency of gathering with friends or relatives Patient declined   Worry food won't last until get money to buy more No   Food not last or not have enough money for food? No   Do you have housing? (Housing is defined as stable permanent housing and does not include staying ouside in a car, in a tent, in an abandoned building, in an overnight shelter, or couch-surfing.) Yes   Are you worried about losing your housing? No   Lack of transportation? No   Unable to get utilities (heat,electricity)? No            8/2/2024   Fall Risk   Gait Speed Test (Document in seconds) 2.79   Gait Speed Test Interpretation Less than or equal to 5.00 seconds - PASS             8/1/2024   Activities of Daily Living- Home Safety   Needs help with the following daily activites None of the above   Safety concerns in the home None of the above            8/1/2024   Dental   Dentist two times every year? (!) NO            8/1/2024   Hearing Screening   Hearing concerns? (!) I NEED TO ASK PEOPLE TO SPEAK UP OR REPEAT THEMSELVES.    (!) IT'S HARDER TO UNDERSTAND WOMEN'S VOICES THAN MEN'S VOICES.    (!) TROUBLE UNDERSTANDING SOFT OR WHISPERED SPEECH.    (!) TROUBLE UNDERSTANDING SPEECH ON THE TELEPHONE       Multiple values from one day are sorted in reverse-chronological order         8/1/2024   Driving Risk Screening   Patient/family members have concerns about driving No            8/1/2024   General Alertness/Fatigue Screening   Have you been more tired than usual lately? No            8/1/2024   Urinary Incontinence Screening   Bothered by leaking urine in past 6 months No            8/1/2024   TB Screening   Were you born outside of the [place]? No            Today's PHQ-2 Score:       8/1/2024     6:58 PM   PHQ-2 ( 1999 [employer])   Q1: Little interest or pleasure in doing things 1   Q2: Feeling down, depressed or hopeless 0   PHQ-2 Score 1   Q1: Little interest or pleasure in doing things Several days   Q2: Feeling down, depressed or  hopeless Not at all   PHQ-2 Score 1           8/1/2024   Substance Use   Alcohol more than 3/day or more than 7/wk No   Do you have a current opioid prescription? No   How severe/bad is pain from 1 to 10? 3/10   Do you use any other substances recreationally? No        Social History     Tobacco Use    Smoking status: Never     Passive exposure: Never    Smokeless tobacco: Never   Vaping Use    Vaping status: Never Used   Substance Use Topics    Alcohol use: Yes     Comment: 1 beer a month    Drug use: No         6/5/2023   LAST FHS-7 RESULTS   1st degree relative breast or ovarian cancer No   Any relative bilateral breast cancer No   Any male have breast cancer No   Any ONE woman have BOTH breast AND ovarian cancer No   Any woman with breast cancer before 50yrs No   2 or more relatives with breast AND/OR ovarian cancer No   2 or more relatives with breast AND/OR bowel cancer No      Mammogram Screening - After age 74- determine frequency with patient based on health status, life expectancy and patient goals    ASCVD Risk   The 10-year ASCVD risk score (Fernanda FERNANDEZ, et al., 2019) is: 14.8%    Values used to calculate the score:      Age: 74 years      Sex: Female      Is Non- : No      Diabetic: No      Tobacco smoker: No      Systolic Blood Pressure: 122 mmHg      Is BP treated: No      HDL Cholesterol: 64 mg/dL      Total Cholesterol: 205 mg/dL        Reviewed and updated as needed this visit by Provider                    Past Medical History:   Diagnosis Date    Cancer (H)     melanoma of cheek    History of blood transfusion     with hysterectomy many years ago    Lyme disease     Motion sickness     Thyroid disease     partial thyroidectomy 8/2014     Past Surgical History:   Procedure Laterality Date    ABDOMEN SURGERY      APPENDECTOMY      ARTHROSCOPY KNEE WITH MEDIAL MENISCECTOMY Left 09/20/2016    Procedure: ARTHROSCOPY KNEE WITH MEDIAL MENISCECTOMY;  Surgeon: Maximilian Ochoa  DO German;  Location: [facility]    BIOPSY NODE SENTINEL N/A 08/26/2014    Procedure: BIOPSY NODE SENTINEL;  Surgeon: Eliza Foss MD;  [facility]    BLEPHAROPLASTY BILATERAL Bilateral 01/11/2018    Procedure: BLEPHAROPLASTY BILATERAL;  Bilateral upper eyelid blepharoplasty;  Surgeon: Jame Painter MD;  Location: [facility]    CATARACT IOL, RT/LT Bilateral OD 12/22/16-OS 12/8/16    CHOLECYSTECTOMY  long time ago    COLONOSCOPY  03/22/2013    Procedure: COLONOSCOPY;  colonoscopy;  Surgeon: Mike Lr MD;  Location: [facility]    COSMETIC SURGERY  august 2014    face-August 2014    HYSTERECTOMY, PAP NO LONGER INDICATED      ORTHOPEDIC SURGERY      shoulder surgery    PHACOEMULSIFICATION WITH STANDARD INTRAOCULAR LENS IMPLANT Left 12/08/2016    Procedure: PHACOEMULSIFICATION WITH STANDARD INTRAOCULAR LENS IMPLANT;  Surgeon: Jame Painter MD;  Location: [facility]    PHACOEMULSIFICATION WITH STANDARD INTRAOCULAR LENS IMPLANT Right 12/22/2016    Procedure: PHACOEMULSIFICATION WITH STANDARD INTRAOCULAR LENS IMPLANT;  Surgeon: Jame Painter MD;  [facility]    RECONSTRUCT NOSE STAGE ONE N/A 09/05/2014    Procedure: RECONSTRUCT NOSE STAGE ONE;  Surgeon: Levy Holbrook MD;  [facility]    REVISE SCAR FACE N/A 08/26/2014    Procedure: REVISE SCAR FACE;  Surgeon: Eliza Foss MD;  [facility]    SOFT TISSUE SURGERY  2010    bisept tendon-left arm    SUTURE SUSPENSIONPLASTY THUMB Left 01/31/2022    Procedure: Left EXCISION, TRAPEZIUM, WITH SUTURE SUSPENSIONPLASTY;  Surgeon: Maximilian Ochoa DO;  Location: [facility]    THYROIDECTOMY Left 08/26/2014    Procedure: THYROIDECTOMY;  Surgeon: Eliza Foss MD;  [facility]    Z TOTAL ABDOM HYSTERECTOMY  11/16/1993    TAHRSO, Rasheed urethropexy     Current providers sharing in care for this patient include:  Patient Care Team:  Frannie Bernstein APRN CNP as PCP - General (Family Medicine)  Amandeep  "CORAL Guidry CNP as Assigned PCP  Kiley Bravo DO as Assigned Cancer Care Provider  Hadley Rousseau DPM as Assigned Surgical Provider    The following health maintenance items are reviewed in Epic and correct as of today:  Health Maintenance   Topic Date Due    ZOSTER IMMUNIZATION (1 of 2) Never done    RSV VACCINE (Pregnancy & 60+) (1 - 1-dose 60+ series) Never done    COLORECTAL CANCER SCREENING  03/22/2023    COVID-19 Vaccine (6 - 2023-24 season) 09/01/2023    ANNUAL REVIEW OF HM ORDERS  07/26/2024    MEDICARE ANNUAL WELLNESS VISIT  07/26/2024    INFLUENZA VACCINE (1) 09/01/2024    EYE EXAM  11/12/2024    LIPID  01/24/2025    TSH W/FREE T4 REFLEX  01/24/2025    FALL RISK ASSESSMENT  08/02/2025    DTAP/TDAP/TD IMMUNIZATION (2 - Td or Tdap) 02/23/2026    GLUCOSE  01/24/2027    ADVANCE CARE PLANNING  07/26/2028    DEXA  01/24/2029    HEPATITIS C SCREENING  Completed    PHQ-2 (once per calendar year)  Completed    Pneumococcal Vaccine: 65+ Years  Completed    IPV IMMUNIZATION  Aged Out    HPV IMMUNIZATION  Aged Out    MENINGITIS IMMUNIZATION  Aged Out    RSV MONOCLONAL ANTIBODY  Aged Out    MAMMO SCREENING  Discontinued         Review of Systems  Constitutional, HEENT, cardiovascular, pulmonary, gi and gu systems are negative, except as otherwise noted.     Objective    Exam  /68   Pulse 64   Temp 97  F (36.1  C) (Temporal)   Resp 20   Ht 1.622 m (5' 3.86\")   Wt 74.4 kg (164 lb)   LMP  (LMP Unknown)   SpO2 99%   BMI 28.28 kg/m     Estimated body mass index is 28.28 kg/m  as calculated from the following:    Height as of this encounter: 1.622 m (5' 3.86\").    Weight as of this encounter: 74.4 kg (164 lb).    Physical Exam  GENERAL: alert and no distress  EYES: Eyes grossly normal to inspection, PERRL and conjunctivae and sclerae normal  HENT: ear canals and TM's normal, nose and mouth without ulcers or lesions  NECK: no adenopathy, no asymmetry, masses, or scars  RESP: lungs clear to " auscultation - no rales, rhonchi or wheezes  CV: regular rate and rhythm, normal S1 S2, no S3 or S4, no murmur, click or rub, no peripheral edema  ABDOMEN: soft, nontender, no hepatosplenomegaly, no masses and bowel sounds normal  SKIN: no suspicious lesions or rashes  NEURO: Normal strength and tone, mentation intact and speech normal  PSYCH: mentation appears normal, affect normal/bright        8/2/2024   Mini Cog   Clock Draw Score 2 Normal   3 Item Recall 2 objects recalled   Mini Cog Total Score 4             Signed Electronically by: CORAL Rudolph CNP

## 2024-08-05 ENCOUNTER — TELEPHONE (OUTPATIENT)
Dept: FAMILY MEDICINE | Facility: OTHER | Age: 74
End: 2024-08-05
Payer: COMMERCIAL

## 2024-08-05 DIAGNOSIS — R74.8 ELEVATED CREATINE KINASE: Primary | ICD-10-CM

## 2024-11-13 ENCOUNTER — TELEPHONE (OUTPATIENT)
Dept: FAMILY MEDICINE | Facility: OTHER | Age: 74
End: 2024-11-13
Payer: COMMERCIAL

## 2024-11-13 NOTE — TELEPHONE ENCOUNTER
Please call patient reminder her of overdue labs. Want to recheck her kidney function       CORAL Rudolph CNP  Questions or concerns please feel free to send me a [employer] message or call me  Phone : [phone]

## 2025-01-08 DIAGNOSIS — K21.00 GASTROESOPHAGEAL REFLUX DISEASE WITH ESOPHAGITIS, UNSPECIFIED WHETHER HEMORRHAGE: ICD-10-CM

## 2025-01-09 RX ORDER — PANTOPRAZOLE SODIUM 40 MG/1
40 TABLET, DELAYED RELEASE ORAL DAILY
Qty: 90 TABLET | Refills: 0 | Status: SHIPPED | OUTPATIENT
Start: 2025-01-09

## 2025-04-06 DIAGNOSIS — K21.00 GASTROESOPHAGEAL REFLUX DISEASE WITH ESOPHAGITIS, UNSPECIFIED WHETHER HEMORRHAGE: ICD-10-CM

## 2025-04-07 RX ORDER — PANTOPRAZOLE SODIUM 40 MG/1
40 TABLET, DELAYED RELEASE ORAL DAILY
Qty: 90 TABLET | Refills: 0 | Status: SHIPPED | OUTPATIENT
Start: 2025-04-07

## 2025-07-07 ENCOUNTER — PATIENT OUTREACH (OUTPATIENT)
Dept: CARE COORDINATION | Facility: CLINIC | Age: 75
End: 2025-07-07
Payer: COMMERCIAL

## 2025-07-21 DIAGNOSIS — K21.00 GASTROESOPHAGEAL REFLUX DISEASE WITH ESOPHAGITIS, UNSPECIFIED WHETHER HEMORRHAGE: ICD-10-CM

## 2025-07-21 DIAGNOSIS — E03.8 OTHER SPECIFIED HYPOTHYROIDISM: ICD-10-CM

## 2025-07-22 RX ORDER — LEVOTHYROXINE SODIUM 50 UG/1
50 TABLET ORAL DAILY
Qty: 90 TABLET | Refills: 0 | Status: SHIPPED | OUTPATIENT
Start: 2025-07-22

## 2025-07-22 RX ORDER — PANTOPRAZOLE SODIUM 40 MG/1
40 TABLET, DELAYED RELEASE ORAL DAILY
Qty: 90 TABLET | Refills: 0 | Status: SHIPPED | OUTPATIENT
Start: 2025-07-22

## 2025-07-22 NOTE — TELEPHONE ENCOUNTER
Appointments in Next Year      Sep 12, 2025 9:30 AM  (Arrive by 9:10 AM)  Annual Wellness Visit with CORAL Lee CNP  [facility] ([facility] [place]) [phone]

## (undated) DEVICE — ADH SKIN CLOSURE PREMIERPRO EXOFIN 1.0ML 3470

## (undated) DEVICE — NDL 19GA 1.5"

## (undated) DEVICE — GLOVE PROTEXIS W/NEU-THERA 8.0  2D73TE80

## (undated) DEVICE — PACK HAND WRIST FOREARM CUSTOM

## (undated) DEVICE — BLADE KNIFE SURG 15 371115

## (undated) DEVICE — SU VICRYL 3-0 CP-2 18" UND J761D

## (undated) DEVICE — SOL WATER IRRIG 1000ML BOTTLE 07139-09

## (undated) DEVICE — DRAPE SHEET REV FOLD 3/4 9349

## (undated) DEVICE — PREP CHLORAPREP 26ML TINTED ORANGE  260815

## (undated) DEVICE — CAST PLASTER SPLINT 3X15" EXTRA FAST

## (undated) DEVICE — TUBING SUCTION 12"X1/4" N612

## (undated) DEVICE — BASIN SET MINOR DISP

## (undated) DEVICE — NDL COUNTER 20CT 31142493

## (undated) DEVICE — SU PLAIN FAST ABSORB 6-0 PC-1 18" 1916G

## (undated) DEVICE — GLOVE ESTEEM BLUE W/NEU-THERA 7.5  2D73PB75

## (undated) DEVICE — DRAPE C-ARM MINI 5423

## (undated) DEVICE — DRSG GAUZE 4X4" TRAY

## (undated) DEVICE — SU MONOCRYL 4-0 PS-2 18" UND Y496G

## (undated) DEVICE — PACK MINOR SBA15MIFSE

## (undated) DEVICE — ESU GROUND PAD UNIVERSAL W/O CORD

## (undated) DEVICE — CAST PADDING 3" COTTON WEBRIL UNSTERILE 9083

## (undated) DEVICE — GLOVE PROTEXIS W/NEU-THERA 7.5  2D73TE75

## (undated) DEVICE — GLOVE ESTEEM BLUE W/NEU-THERA 8.0  2D73PB80

## (undated) DEVICE — TOURNIQUET CUFF 18" STERILE

## (undated) DEVICE — SOL NACL 0.9% IRRIG 1000ML BOTTLE 07138-09

## (undated) DEVICE — MARKER SKIN DOUBLE TIP W/FLEXI-RULER W/LABELS

## (undated) DEVICE — SYR EAR BULB 2OZ

## (undated) DEVICE — ESU EYE HIGH TEMP 65410-183

## (undated) DEVICE — DRSG XEROFORM 1X8"

## (undated) DEVICE — SYR 03ML LL W/O NDL

## (undated) DEVICE — DRAPE SPLIT EENT 76X124" 3X28" 9447

## (undated) DEVICE — NDL 30GA 1" 305128

## (undated) RX ORDER — EPINEPHRINE 1 MG/ML
INJECTION, SOLUTION INTRAMUSCULAR; SUBCUTANEOUS
Status: DISPENSED
Start: 2018-01-11

## (undated) RX ORDER — FENTANYL CITRATE 50 UG/ML
INJECTION, SOLUTION INTRAMUSCULAR; INTRAVENOUS
Status: DISPENSED
Start: 2018-01-11

## (undated) RX ORDER — MOXIFLOXACIN 5 MG/ML
SOLUTION/ DROPS OPHTHALMIC
Status: DISPENSED
Start: 2018-01-11

## (undated) RX ORDER — PROPOFOL 10 MG/ML
INJECTION, EMULSION INTRAVENOUS
Status: DISPENSED
Start: 2022-01-31

## (undated) RX ORDER — BACITRACIN 500 [USP'U]/G
OINTMENT OPHTHALMIC
Status: DISPENSED
Start: 2018-01-11

## (undated) RX ORDER — EPHEDRINE SULFATE 50 MG/ML
INJECTION, SOLUTION INTRAMUSCULAR; INTRAVENOUS; SUBCUTANEOUS
Status: DISPENSED
Start: 2022-01-31

## (undated) RX ORDER — ONDANSETRON 2 MG/ML
INJECTION INTRAMUSCULAR; INTRAVENOUS
Status: DISPENSED
Start: 2022-01-31

## (undated) RX ORDER — LIDOCAINE HYDROCHLORIDE 20 MG/ML
INJECTION, SOLUTION EPIDURAL; INFILTRATION; INTRACAUDAL; PERINEURAL
Status: DISPENSED
Start: 2022-01-31

## (undated) RX ORDER — FENTANYL CITRATE 50 UG/ML
INJECTION, SOLUTION INTRAMUSCULAR; INTRAVENOUS
Status: DISPENSED
Start: 2022-01-31

## (undated) RX ORDER — LIDOCAINE HYDROCHLORIDE 20 MG/ML
INJECTION, SOLUTION EPIDURAL; INFILTRATION; INTRACAUDAL; PERINEURAL
Status: DISPENSED
Start: 2018-01-11

## (undated) RX ORDER — KETAMINE HYDROCHLORIDE 50 MG/ML
INJECTION, SOLUTION INTRAMUSCULAR; INTRAVENOUS
Status: DISPENSED
Start: 2018-01-11

## (undated) RX ORDER — TETRACAINE HYDROCHLORIDE 5 MG/ML
SOLUTION OPHTHALMIC
Status: DISPENSED
Start: 2018-01-11

## (undated) RX ORDER — BUPIVACAINE HYDROCHLORIDE 5 MG/ML
INJECTION, SOLUTION EPIDURAL; INTRACAUDAL
Status: DISPENSED
Start: 2018-01-11

## (undated) RX ORDER — ONDANSETRON 2 MG/ML
INJECTION INTRAMUSCULAR; INTRAVENOUS
Status: DISPENSED
Start: 2018-01-11

## (undated) RX ORDER — ACETAMINOPHEN 325 MG/1
TABLET ORAL
Status: DISPENSED
Start: 2018-01-11

## (undated) RX ORDER — DEXAMETHASONE SODIUM PHOSPHATE 10 MG/ML
INJECTION, SOLUTION INTRAMUSCULAR; INTRAVENOUS
Status: DISPENSED
Start: 2022-01-31

## (undated) RX ORDER — HYDROMORPHONE HYDROCHLORIDE 1 MG/ML
INJECTION, SOLUTION INTRAMUSCULAR; INTRAVENOUS; SUBCUTANEOUS
Status: DISPENSED
Start: 2022-01-31

## (undated) RX ORDER — BUPIVACAINE HYDROCHLORIDE 5 MG/ML
INJECTION, SOLUTION EPIDURAL; INTRACAUDAL
Status: DISPENSED
Start: 2022-01-31

## (undated) RX ORDER — PROPOFOL 10 MG/ML
INJECTION, EMULSION INTRAVENOUS
Status: DISPENSED
Start: 2018-01-11

## (undated) RX ORDER — LIDOCAINE HYDROCHLORIDE 10 MG/ML
INJECTION, SOLUTION EPIDURAL; INFILTRATION; INTRACAUDAL; PERINEURAL
Status: DISPENSED
Start: 2018-01-11